# Patient Record
Sex: FEMALE | Race: BLACK OR AFRICAN AMERICAN | NOT HISPANIC OR LATINO | Employment: PART TIME | URBAN - METROPOLITAN AREA
[De-identification: names, ages, dates, MRNs, and addresses within clinical notes are randomized per-mention and may not be internally consistent; named-entity substitution may affect disease eponyms.]

---

## 2018-01-02 ENCOUNTER — ALLSCRIPTS OFFICE VISIT (OUTPATIENT)
Dept: OTHER | Facility: OTHER | Age: 33
End: 2018-01-02

## 2018-01-03 NOTE — PROGRESS NOTES
Assessment   1  Chronic bilateral low back pain without sciatica (724 2,338 29) (M54 5,G89 29)   2  Encounter for gynecological examination without abnormal finding (V72 31) (Z01 419)    Plan   Chronic bilateral low back pain without sciatica    · 1 - Oni Chu MD, Humaira Howe (Anesthesiology) Co-Management  *  Status: Active  Requested    for: 81LCO0033   Ordered; For: Chronic bilateral low back pain without sciatica; Ordered By: Lynne Moreira Performed:  Due: 70IJZ5975  Care Summary provided  : Yes  Dyspareunia in female    · Premarin 0 625 MG/GM Vaginal Cream; INSERT 1 APPLICATION VAGINALLY 2    TIMES A WEEK   Rx By: Lynne Moreira; Dispense: 30 Days ; #:30 GM; Refill: 0;For: Dyspareunia in female; DEVI = N; Record  Encounter for gynecological examination without abnormal finding    · Call [phone] if: You find a new or different kind of lump in your breast ;    Status:Complete;   Done: 02JUX2091 10:54AM   Ordered;For:Encounter for gynecological examination without abnormal finding; Ordered By:Beatriz Swann;   · Eat a normal well-balanced diet ; Status:Complete;   Done: 32NZL5132 10:54AM   Ordered;For:Encounter for gynecological examination without abnormal finding; Ordered By:Beatriz Swann;   · Vitamins can help you get daily requirements that your diet may not be giving you ;    Status:Complete;   Done: 89DSH2963 10:54AM   Ordered;For:Encounter for gynecological examination without abnormal finding; Ordered By:Beatriz Swann;   · We encourage all of our patients to exercise regularly  30 minutes of exercise or physical    activity five or more days a week is recommended for children and adults ;    Status:Complete;   Done: 75YVZ4048 10:54AM   Ordered;For:Encounter for gynecological examination without abnormal finding; Ordered By:Beatriz Swann;   · We recommend that you follow these steps to lower your risk of osteoporosis  ;    Status:Complete;   Done: 11ONX2463 10: 54AM   Ordered;For:Encounter for gynecological examination without abnormal finding; Ordered By:Clarissa Swann;  Oral contraceptive prescribed    · Aviane 0 1-20 MG-MCG Oral Tablet; Take 1 tablet daily   Rx By: Dariel Hernandez; Dispense: 28 Days ; #:28 Tablet; Refill: 11; For: Oral contraceptive prescribed; DEVI = N; Sent To: [facility]Y #[id number]    Discussion/Summary   healthy adult female next cervical cancer screening is due 2021       1  Chronic pain - referal to spine/pain management  jackelinetn relates pain to epidural/child birth/pregnancy dyspareunia - premarin cream, pea sized amount twice per week x 3 months Menorrhagia - change triphasic pill to monophasic  The patient has the current Goals: Healthy lifestyle  The patent has the current Barriers: Chronic back pain  Patient is able to Self-Care  PATIENT EDUCATION RECORD She was given the following educational materials:  age appropraite care guide  Possible side effects of new medications were reviewed with the patient/guardian today  The treatment plan was reviewed with the patient/guardian  The patient/guardian understands and agrees with the treatment plan       Self Referrals: No      Chief Complaint   Patient here for yearly exam       History of Present Illness   HPI: Chronic low back pain since delivery- 15 months daily, constant  Very distressing  vaginal dryness    GYN HM, Adult Female [facility]: The patient is being seen for a gynecology evaluation  The last health maintenance visit was 1 year(s) ago  Social History: Household members include spouse,-- 1 daughter(s),-- 1 son(s)-- and-- son 3, daughter 15 months  She is   General Health: The patient's health since the last visit is described as fair  Lifestyle:  She exercises regularly  -- She does not use tobacco -- She denies alcohol use  -- She denies drug use  Reproductive health:  she reports no menstrual problems        Menstrual history: LMP: the last menstrual period was 12/6/17 -- she uses contraception  For contraception, she uses oral contraception pills  -- she is sexually active  -- pregnancy history: G 4P 2  Screening: Cervical cancer screening includes a pap smear performed 4/18/16, neg-- and-- human papilloma virus screening performed 4/18/16, neg  Review of Systems   vaginal pain-- and-- menorrhagia, but-- no pelvic pain,-- no pelvic pressure,-- no vaginal discharge,-- no vaginal itching,-- no vaginal lump or mass,-- no vaginal odor,-- no nonmenstrual bleeding,-- no dysuria-- and-- no urinary loss of control  Constitutional: No fever, no chills, feels well, no tiredness, no recent weight gain or loss  ENT: no ear ache, no loss of hearing, no nosebleeds or nasal discharge, no sore throat or hoarseness  Cardiovascular: no complaints of slow or fast heart rate, no chest pain, no palpitations, no leg claudication or lower extremity edema  Respiratory: no complaints of shortness of breath, no wheezing, no dyspnea on exertion, no orthopnea or PND  Breasts: no complaints of breast pain, breast lump or nipple discharge  Gastrointestinal: no complaints of abdominal pain, no constipation, no nausea or diarrhea, no vomiting, no bloody stools  Genitourinary: no complaints of dysuria, no incontinence, no pelvic pain, no dysmenorrhea, no vaginal discharge or abnormal vaginal bleeding  Musculoskeletal: no complaints of arthralgia, no myalgia, no joint swelling or stiffness, no limb pain or swelling  Integumentary: no complaints of skin rash or lesion, no itching or dry skin, no skin wounds  Neurological: no complaints of headache, no confusion, no numbness or tingling, no dizziness or fainting  Active Problems   1  BMI 40 0-44 9, adult (V85 41) (Z68 41)   2  Migraines (346 90) (G43 909)   3  Oral contraceptive prescribed (V25 01) (Z30 011)   4   Postpartum exam (V24 2) (Z39 2)    Past Medical History    · History of Childhood asthma (493 00) (J45 909)   · History of Dysuria (788 1) (R30 0)   · History of Gestational diabetes mellitus (GDM) in third trimester, gestational diabetes    method of control unspecified (648 83) (O24 419)   · History of Gestational diabetes mellitus in pregnancy (648 80) (O24 419)   · History of elective  (V13 29) (Z87 42)   · Normal delivery (650) (A22,B88  9)    Surgical History    · History of Surgically Induced  - By Dilation And Evacuation    Family History   Mother    · Family history of cardiac pacemaker (V17 49) (Z84 89)   · Family history of hypertension (V17 49) (Z82 49)   · Family history of malignant neoplasm of breast (V16 3) (Z80 3)   · Family history of Heart disease, ill-defined  Father    · Family history of Insulin dependent diabetes mellitus  Paternal Grandmother    · Family history of Insulin dependent diabetes mellitus    Social History    · Denied: History of Alcohol use   · Always uses seat belt   · Denied: History of Drug use   ·    · Never a smoker    Allergies   1  No Known Environmental Allergies   2  No Known Food Allergies    Vitals    Recorded: 08XIQ4637 49:34ZU   Systolic 577 mm Hg   Diastolic 78 mm Hg   Height 5 ft 4 in   Weight 255 lb    BMI Calculated 43 77 kg/m2   BSA Calculated 2 17 m2   LMP 59HPO3266     Physical Exam        Constitutional      General appearance: No acute distress, well appearing and well nourished  Genitourinary      External genitalia: Normal and no lesions appreciated  Vagina: Normal, no lesions or dryness appreciated  Urethra: Normal        Urethral meatus: Normal        Bladder: Normal, soft, non-tender and no prolapse or masses appreciated  Cervix: Normal, no palpable masses  Uterus: Normal, non-tender, not enlarged, and no palpable masses  Adnexa/parametria: Normal, non-tender and no fullness or masses appreciated         Chest      Breasts: Normal and no dimpling or skin changes noted         Future Appointments      Date/Time Provider Specialty Site   01/07/2019 03:15 PM Estephania Boggs MD Obstetrics/Gynecology [facility]     Signatures    Electronically signed by : Rupinder Kendall MD; Jan 2 2018 10:56AM EST                       (Author)

## 2018-01-10 NOTE — MISCELLANEOUS
Message   Recorded as Task   Date: 2016 02:07 PM, Created By: Carmina Gilbert   Task Name: Review Document   Assigned To: Richard Momin   Regarding Patient: Aaron Castellano, Status: Active   Comment:    Carmina Gilbert - 16 May 2016 2:07 PM     TASK CREATED  Patient has an elevated fasting glucose on her 3 hour GTT  Please make a referral to diabetic pathways  Barb - 16 May 2016 2:51 PM     TASK REPLIED TO: Previously Assigned To [facility],Team  Done        Active Problems    1  Abnormal glucose in pregnancy, antepartum (648 83) (O99 810)   2  Encounter for routine gynecological examination (V72 31) (Z01 419)   3  Encounter for supervision of other normal pregnancy in first trimester (V22 1) (Z34 81)   4  Encounter to determine fetal viability of pregnancy, not applicable or unspecified fetus   (V23 87) (O36 80X0)   5  H/O gestational diabetes in prior pregnancy, currently pregnant (V23 49)   (O09 299,Z86 32)   6  High risk pregnancy due to previous abortions (V23 2) (O09 299)   7  History of elective  (V13 29) (Z87 42)   8  Migraines (346 90) (G43 909)   9  Pregnancy complicated by obesity (079 14,540 59) (O99 210,E66 9)   10  Pregnancy, obstetrical care (V22 1) (Z34 90)    Current Meds   1  CVS DHA Prenatal CAPS; Therapy: (Recorded:2016) to Recorded    Allergies    1  No Known Environmental Allergies   2   No Known Food Allergies    Signatures   Electronically signed by : John Celaya, ; May 16 2016  2:51PM EST                       (Author)

## 2018-01-10 NOTE — MISCELLANEOUS
Message   Recorded as Task   Date: 2016 04:33 PM, Created By: Mariusz Carroll   Task Name: Review Document   Assigned To: Mariusz Carroll   Regarding Patient: Nieves Rosa, Status: In Progress   Comment:    Yumiko Palomino - 2016 4:33 PM     TASK CREATED  Please call patient to inform her that 1h GCT is elevated at 148 - will need 3h GTT  Also, no HIV on this report  Can you verify that that is pending? Valorie Bansal - 2016 2:55 PM     TASK IN PROGRESS   Valorie Bansal - 2016 2:56 PM     TASK REPLIED TO: Previously Assigned To [facility],Team  spoke w/ pt aware  No HIV was done  Will add to 3 hr GTT  Instructions given to pt  Rx faxed  Active Problems    1  Encounter for routine gynecological examination (V72 31) (Z01 419)   2  Encounter for supervision of other normal pregnancy in first trimester (V22 1) (Z34 81)   3  Encounter to determine fetal viability of pregnancy, not applicable or unspecified fetus   (V23 87) (O36 80X0)   4  H/O gestational diabetes in prior pregnancy, currently pregnant (V23 49)   (O09 299,Z86 32)   5  High risk pregnancy due to previous abortions (V23 2) (O09 299)   6  History of elective  (V13 29) (Z87 42)   7  Migraines (346 90) (G43 909)   8  Pregnancy complicated by obesity (134 68,604 98) (O99 210,E66 9)   9  Pregnancy, obstetrical care (V22 1) (Z34 90)    Current Meds   1  CVS DHA Prenatal CAPS; Therapy: (Recorded:2016) to Recorded    Allergies    1  No Known Environmental Allergies   2   No Known Food Allergies    Signatures   Electronically signed by : Hao Navarro, ; 2016  2:57PM EST                       (Author)

## 2018-01-10 NOTE — PROGRESS NOTES
SEP 23 2016         RE: Leona Peterson                        To: [facility]  MR#: [id number]                                    [address]   : [place] [address]                                  [address]   ENC: [id number]:ASHER Thomson, [address]   (Exam #: [id number])                           Fax: [phone]      The LMP of this 27year old,  G4, P1-0-2-1 patient was 2016, giving   her an DAYSI of OCT 29 2016 and a current gestational age of 29 weeks 6 days   by dates  A sonographic examination was performed on SEP 23 2016 using   real time equipment  The ultrasound examination was performed using   abdominal technique  The patient has a BMI of 41 9  Her blood pressure   today was 114/54  Earliest ultrasound found in her record: 16 10w4d 10/31/16 DAYSI      Problem list   1  GDMa2 found in early pregnancy  Initial hgba1c was 5 8%  2  Morbid obesity   3  Probable CHTN with bp of 143/69 and 140/82 outside of pregnancy  4  Proteinuria was found at 30 weeks of pregnancy - 528 mg in 24 hrs  No   other earlier 24 hr protein was completed  Other preeclamptic labs were   normal with a uric acid of 4, nml lfts, and nml plts of 159,000 on 16  Cardiac motion was observed at 139 bpm       INDICATIONS      diabetes, gestational, class A2   increased BMI   proteinuria   chronic hypertension      Exam Types      Amniotic Fluid Index      RESULTS      Fetus # 1 of 1   Vertex presentation   Placenta Location = Anterior   No placenta previa   Placenta Grade = II      The NST was reactive with no decelerations        AMNIOTIC FLUID      Q1: 5 3      Q2: 4 9      Q3: 4 2      Q4: 3 7   NEVAEH Total = 18 0 cm   Amniotic Fluid: Normal      IMPRESSION      Rebolledo IUP   Vertex presentation   Regular fetal heart rate of 139 bpm   Anterior placenta   No placenta previa      GENERAL COMMENT      Her follow up care should include twice weekly NST's and a once weekly   amniotic fluid assessment, along with her daily kick counts  Nabeel Dennis asked to speak with me today  She wanted to review the last note by   diabetes education in which they were adding another type of insulin   called Humalog 4 units with each meal  They also are increasing her Lantus   120 units at nighttime divided into 2 equal injections in 2 different   areas the body to cover her high FBS  She was concerned that she needs so   much insulin this time  I looked at her prior pregnancy and she was using   more insulin then ( 220 u of Lantus over all giving 80 with dinner and 140   units q hs  She was also on Humalog 9 with breakfast and 8 u with lunch   and 6 u with dinner)  She weighed less at 234 lbs near her DAYSI in her last   pregnancy  Now she weighs 244  Her father and paternal grand father have   IDDM  Her last hemoglobin A1c was 5 4 on September 9  She asked if there were any of the options to control her sugars since it   seems to her like she keep needing higher doses  I looked up Metformin and   did not find a contraindication  Will start metfomin in addition to her   insulin based on her request  Will notify Diabetes education  The face to face time, in addition to time spent discussing ultrasound   results, was 15 minutes, greater than 50% of which was spent during   counseling and coordination of care  ELEAZAR Purdy M D     Maternal-Fetal Medicine   Electronically signed 09/24/16 13:24

## 2018-01-10 NOTE — MISCELLANEOUS
Provider Comments  Provider Comments:   pt n/s for her annual appt      Signatures   Electronically signed by : Severino Jacobson, ; Apr 5 2017  9:40AM EST                       (Author)

## 2018-01-10 NOTE — MISCELLANEOUS
Message   Recorded as Task   Date: 10/19/2016 02:20 PM, Created By: Eron Peters   Task Name: Follow Up   Assigned To: Hossein Clark   Regarding Patient: Wallace Graham, Status: In Progress   Sarah Arias - 19 Oct 2016 2:20 PM     TASK CREATED  Caller: Self; [phone] (Home)  pt has some questions for nurse call her at [phone]   [facility] - 19 Oct 2016 3:32 PM     TASK IN [address] [place] [address]  - 19 Oct 2016 3:54 PM     TASK REPLIED TO: Previously Assigned To [address] to pt  See task that was sent to   Active Problems    1  BMI 40 0-44 9, adult (V85 41) (Z68 41)   2  Breast feeding status of mother (V24 1) (Z39 1)   3  Chronic hypertension in pregnancy (642 00) (O10 919)   4  High risk pregnancy due to previous abortions (V23 2) (O09 299)   5  Insulin controlled gestational diabetes mellitus (GDM) in third trimester (648 83)   (O24 414)   6  Maternal morbid obesity, antepartum, third trimester (649 13,278 01) (O99 213,E66 01)   7  Migraines (346 90) (G43 909)   8  Pregnancy, obstetrical care (V22 1) (Z34 90)   9  Proteinuria affecting pregnancy in third trimester (646 23,791 0) (O12 13)   10  Supervision of high risk pregnancy in third trimester (V23 9) (O09 93)    Current Meds   1  Breast Pump Miscellaneous; Therapy: 13MEV2320 to Recorded   2  CVS DHA Prenatal CAPS; Therapy: (Recorded:08Apr2016) to Recorded   3  HumaLOG KwikPen 100 UNIT/ML Subcutaneous Solution Pen-injector; Humalog kwik   pen 4 units TID with meals  Titrate as directed; Therapy: 39LMW5948 to (Evaluate:20Jan2017)  Requested for: 98XKN6910; Last   Rx:17Zpi4267 Ordered   4  MetFORMIN HCl - 500 MG Oral Tablet; Take one tab daily with breakfast and two tablets   with dinner; Therapy: 80EVD6875 to (Bertis Ore)  Requested for: 75XAU8196; Last   Rx:29Sep2016 Ordered   5  NovoFine 30G X 8 MM Miscellaneous; QID with insulin injections;    Therapy: 13NCK3170 to (Last Rx:29Sep2016) Requested for: 44LLX5607 Ordered   6  OneTouch Delica Lancets 23T Miscellaneous; TEST 4 TIMES DAILY; Therapy: 15GWA5665 to (Evaluate:51Wnw6693)  Requested for: 09NOQ3791; Last   Rx:49Fpn0642 Ordered   7  OneTouch Verio In [employer]; test 4 times daily; Therapy: 22LQQ5861 to (Evaluate:05Ego2945)  Requested for: 83TNU3819; Last   Rx:63Hmg0657 Ordered   8  Toujeo SoloStar 300 UNIT/ML Subcutaneous Solution Pen-injector; inject 144 units at   HS; titrate twice weekly as directed; Therapy: 81CLJ2377 to Ramu Matson)  Requested for: 05DJZ9211; Last   Rx:25Quf9418 Ordered    Allergies    1  No Known Environmental Allergies   2   No Known Food Allergies    Signatures   Electronically signed by : Asha Cosby, ; Oct 19 2016  3:54PM EST                       (Author)

## 2018-01-11 NOTE — PROGRESS NOTES
Chief Complaint  Patient received Tdap in Left Deltoid without complaints  OBB#[id number] LOT# [id number] EXP: 9/28/2018  Patient received Flu shot in Right Deltoid without complaints  EDQ#[id number] [facility]#[id number] EXP: 6/30/2017      Active Problems    1  BMI 40 0-44 9, adult (V85 41) (Z68 41)   2  Gestational HTN (642 30) (O13 9)   3  High risk pregnancy due to previous abortions (V23 2) (O09 299)   4  Insulin controlled gestational diabetes mellitus (GDM) in third trimester (648 83)   (O24 414)   5  Maternal morbid obesity, antepartum, third trimester (649 13,278 01) (O99 213,E66 01)   6  Migraines (346 90) (G43 909)   7  Pregnancy complicated by obesity (125 22,867 96) (O99 210,E66 9)   8  Pregnancy, obstetrical care (V22 1) (Z34 90)   9  Proteinuria affecting pregnancy in third trimester (646 23,791 0) (O12 13)   10  Supervision of high risk pregnancy in third trimester (V23 9) (O09 93)    Current Meds   1  CVS DHA Prenatal CAPS; Therapy: (Recorded:08Apr2016) to Recorded   2  Lantus SoloStar 100 UNIT/ML Subcutaneous Solution Pen-injector; 52 units at hs, titrate   weekly as directed; Therapy: 79TDD2575 to (Last Rx:75Yfu2470)  Requested for: 57Hkf3125 Ordered   3  NovoFine 30G X 8 MM Miscellaneous; daily with insulin injection; Therapy: 24DAY0386 to (Gil Peoples)  Requested for: 94XIB0196; Last   Rx:08Jun2016 Ordered   4  OneTouch Delica Lancets 44B Miscellaneous; TEST 4 TIMES DAILY; Therapy: 83ENK1047 to (Evaluate:77Tst8149)  Requested for: 30PRH3608; Last   Rx:67Wba6137 Ordered   5  OneTouch Verio In [employer]; test 4 times daily; Therapy: 97HQG7013 to (Evaluate:98Jnh3306)  Requested for: 41RYC9313; Last   Rx:08Enk7542 Ordered    Allergies    1  No Known Environmental Allergies   2   No Known Food Allergies    Vitals  Signs    Systolic: 683  Diastolic: 80  LMP: 93-SALTER-7615  Height: 5 ft 4 in  Weight: 246 lb 10 08 oz  BMI Calculated: 42 33  BSA Calculated: 2 15    Future Appointments    Date/Time Provider Specialty Site   10/14/2016 03:00 PM ALEXANDER Carrillo   Obstetrics/Gynecology [facility]   10/21/2016 12:00 PM Lc Cardona DO Obstetrics/Gynecology [facility]   2016 11:00 AM  [place], Schedule  [facility]   2016 11:30 AM  [place] [facility]   2016 11:00 AM  [place] [facility], Schedule  [facility]   2016 11:00 AM  [place] [facility]   2016 11:30 AM  [place] [facility]   10/04/2016 01:00 PM Abelardo Mcdonald [facility] Obstetrics/Gynecology [facility]   2016 09:15 AM [facility] 1st Year Resident, Schedule   Marco Antonio Duran 41     Signatures   Electronically signed by : ALEXANDER Galvin ; Sep 20 2016  3:16PM EST

## 2018-01-11 NOTE — PROGRESS NOTES
Active Problems    1  BMI 40 0-44 9, adult (V85 41) (Z68 41)   2  Insulin controlled gestational diabetes mellitus (GDM) in third trimester (648 83)   (O24 414)   3  Maternal morbid obesity, antepartum, third trimester (649 13,278 01) (O99 213,E66 01)   4  Proteinuria affecting pregnancy in third trimester (646 23,791 0) (O12 13)    Allergies    1  No Known Environmental Allergies   2  No Known Food Allergies    Vitals  Signs   Recorded: 16JXN1017 90:38CY   Systolic: 933, LUE, Sitting  Diastolic: 54, LUE, Sitting  Pain Scale: 0  Height: 5 ft 4 in  Weight: 245 lb 9 6 oz  BMI Calculated: 42 16  BSA Calculated: 2 13  BP Cuff Size: Large    Procedure    G/P 4/1   [facility] 10/29/2016   EGA 33 6/7   /59   Indication: gestational diabetes and morbid obesity  Duration of Test 24 minutes  Result: Reactive and > 15 bpm with movement  Baseline Rate 145 bpm    Deceleration: None  Uterine Activity: None  NEVAEH: 19 2 cms  Comment: vtx   Placental stGstrstastdstest:st st1st Required # Stimuli Response: No    Fetal kick counts were reviewed with the patient  Recommend NST: twice weekly  Recommend NEVAEH: weekly  Current Meds   1  CVS DHA Prenatal CAPS; Therapy: (Recorded:72Vnm2781) to Recorded   2  Lantus SoloStar 100 UNIT/ML Subcutaneous Solution Pen-injector; 52 units at hs, titrate   weekly as directed; Therapy: 37RTU1502 to (Last Rx:33Eje9429)  Requested for: 19Omf9059 Ordered   3  NovoFine 30G X 8 MM Miscellaneous; daily with insulin injection; Therapy: 33GGC3092 to (Carlos Joying)  Requested for: 16NOC9114; Last   Rx:08Jun2016 Ordered   4  OneTouch Delica Lancets 33O Miscellaneous; TEST 4 TIMES DAILY; Therapy: 48UOZ7827 to (Evaluate:78Ege3736)  Requested for: 87NTP0329; Last   Rx:84Giv6633 Ordered   5  OneTouch Verio In [employer]; test 4 times daily;    Therapy: 22FJC7307 to (Evaluate:01Ylh9297)  Requested for: 70CBB1900; Last   JX:95UOQ1077 Ordered    Future Appointments    Date/Time Provider Specialty Site   10/14/2016 03:00 PM ALEXANDER Prince  Obstetrics/Gynecology [facility]   10/21/2016 12:00 PM Anthony Jennings DO Obstetrics/Gynecology [facility]   2016 11:30 AM  Makayla Lugo, [address] [place] [address] Road   2016 11:00 AM  Sylvie Zhen, Schedule  [facility]   2016 11:30 AM  [facility]   2016 11:00 AM  Sylvie Zhen, Schedule  [facility]   2016 11:00 AM  Sylvie Zhen, Schedule  [facility]   2016 11:30 AM  Transfer, Schedule  [facility]   10/04/2016 01:00 PM Forrest Claros [facility] Obstetrics/Gynecology [facility]   2016 01:00 PM ALEXANDER Corbin   Obstetrics/Gynecology [facility]   2016 09:15 AM ELEAZAR Walls 1st Year Resident, Schedule  \[facility] [place] [facility]\""  [address]     Signatures   Electronically signed by : Vanessa Lundy, ; Sep 16 2016 11:38AM EST                       (Author)    Electronically signed by : MIKA Floyd ,MD; Sep 16 2016  1:22PM EST                       (Author)

## 2018-01-13 NOTE — RESULT NOTES
Message   Please ensure that a hard copy is signed off please     Verified Results  (1) SEQUENTIAL SCREEN 2 [id number] 11:49AM Gigi Ramirez     Test Name Result Flag Reference   SCAN RESULT      Results available in the [facility]

## 2018-01-13 NOTE — PROGRESS NOTES
SEP 30 2016         RE: Kalyan JiménezMekaCapone                        To: [facility]  MR#: [id number]                                    [address]   : [address]                                  [address]   ENC: [id number]:ANIBALYORDY Thomson, [address]   (Exam #: [id number])                           Fax: [phone]      The LMP of this 27year old,  G4, P1-0-2-1 patient was 2016, giving   her an DAYSI of OCT 29 2016 and a current gestational age of 27 weeks 6 days   by dates  A sonographic examination was performed on SEP 30 2016 using   real time equipment  The ultrasound examination was performed using   abdominal technique  The patient has a BMI of 42 0  Her blood pressure   today was 110/56  Earliest ultrasound found in her record: 16 10w4d 10/31/16 DAYSI      Problem list   1  GDMa2 found in early pregnancy  Initial hgba1c was 5 8%  2  Morbid obesity   3  Probable CHTN with bp of 143/69 and 140/82 outside of pregnancy - no   meds  4  Proteinuria was found at 30 weeks of pregnancy - 528 mg in 24 hrs  No   other earlier 24 hr protein was completed  Other preeclamptic labs were   normal with a uric acid of 4, nml lfts, and nml plts of 159,000 on 16  Kalyan has no complaints her to reports regular fetal movement and denies   problems related to vaginal bleeding,  labor, or preeclampsia  She   is currently treated with 144 units of Lantus at night, along with 4 units   of Humalog with each meal, and 500 mg of metformin before breakfast, and   1000 mg before dinner  Her most recent hemoglobin A1c on  was   5 4%  She will be converted soon to [facility] therapy given the high dose of   Lantus currently required        Cardiac motion was observed at 144 bpm       INDICATIONS      diabetes, gestational, class A2   morbid obesity      Exam Types      Level I   NST      RESULTS      Fetus # 1 of 1   Vertex presentation   Fetal growth appeared normal   Placenta Location = Anterior, fundal   No placenta previa   Placenta Grade = II      The NST was reactive with no decelerations  MEASUREMENTS (* Included In Average GA)      AC              34 5 cm        38 weeks 5 days* (>95%)   BPD              8 4 cm        33 weeks 6 days* (14%)   HC              31 2 cm        34 weeks 4 days* (15%)   Femur            7 2 cm        36 weeks 3 days* (67%)      HC/AC           0 90   FL/AC           0 21   FL/BPD          0 86   EFW (Ac/Fl/Hc)  3153 grams - 6 lbs 15 oz                 (70%)      THE AVERAGE GESTATIONAL AGE is 35 weeks 6 days +/- 21 days  AMNIOTIC FLUID      Q1: 3 7      Q2: 2 8      Q3: 5 8      Q4: 3 6   NEVAEH Total = 15 8 cm   Amniotic Fluid: Normal      ANATOMY DETAILS      Visualized Appearing Sonographically Normal:   HEAD: (Calvarium, BPD Level, Cavum, Lateral Ventricles, Cerebellum,   Cisterna Magna);    TH  CAV : (Diaphragm); HEART: (Four Chamber View,   Proximal Right Outflow, 3 Vessel Trachea, Interventricular Septum,   Interatrial Septum, Cardiac Axis, Cardiac Position);    STOMACH, RIGHT   KIDNEY, LEFT KIDNEY, BLADDER, PLACENTA      IMPRESSION      Rebolledo IUP   35 weeks and 6 days by this ultrasound  (DAYSI=OCT 29 2016)   Vertex presentation   Fetal growth appeared normal   Regular fetal heart rate of 144 bpm   Anterior, fundal placenta   No placenta previa      GENERAL COMMENT      No fetal structural abnormality is identified on the Level I survey today  Evaluation of biometry reveals an abdominal circumference size measured at   greater than the 95th percentile for this gestational age, with an overall   estimated fetal weight placed at the 70th percentile  The amniotic fluid   volume is normal       Today's ultrasound findings and suggested follow-up were discussed in   detail with Cail Higgins  Fetal growth will be reassessed at about 38 weeks   gestation    Suggested follow-up fetal surveillance also includes   continuation of twice per week NST's, weekly NEVAEH's, and daily kick counts  Yolis Hanson should continue to maintain contact with the Diabetes and Pregnancy   program in the [facility]  at least once a week for review of her   blood glucose values and adjustment of insulin doses  Delivery is   recommended at 44 to 40 weeks gestation, earlier if otherwise clinically   indicated  The face to face time, in addition to time spent discussing ultrasound   results, was 10 approximately  minutes, greater than 50% of which was   spent during counseling and coordination of care  ELEAZAR Sauer M D     Maternal-Fetal Medicine   Electronically signed 09/30/16 13:22

## 2018-01-13 NOTE — PROGRESS NOTES
Active Problems    1  BMI 40 0-44 9, adult (V85 41) (Z68 41)   2  Chronic hypertension in pregnancy (642 00) (O10 919)   3  High risk pregnancy due to previous abortions (V23 2) (O09 299)   4  Insulin controlled gestational diabetes mellitus (GDM) in third trimester (648 83)   (O24 414)   5  Maternal morbid obesity, antepartum, third trimester (649 13,278 01) (O99 213,E66 01)   6  Migraines (346 90) (G43 909)   7  Pregnancy, obstetrical care (V22 1) (Z34 90)   8  Proteinuria affecting pregnancy in third trimester (646 23,791 0) (O12 13)   9  Supervision of high risk pregnancy in third trimester (V23 9) (O09 93)    Allergies    1  No Known Environmental Allergies   2  No Known Food Allergies    Vitals  Signs   Recorded: 04HFV2159 98:38KD   Systolic: 159, LUE, Sitting  Diastolic: 60, LUE, Sitting  Pain Scale: 0  Height: 5 ft 4 in  Weight: 244 lb 4 8 oz  BMI Calculated: 41 93  BSA Calculated: 2 13  BP Cuff Size: Large    Procedure    G/P 4/1   [facility] 10/29/2016   EGA 37 6/7   /60   Indication: gestational diabetes and morbid obesity  Duration of Test 20 minutes  Result: Reactive and > 15 bpm with movement  Baseline Rate 150 bpm    Deceleration: None  Uterine Activity: None  Required # Stimuli Response: No    Fetal kick counts were reviewed with the patient  Recommend NST: twice weekly  Recommend NEVAEH: weekly  Current Meds   1  CVS DHA Prenatal CAPS; Therapy: (Recorded:08Apr2016) to Recorded   2  HumaLOG KwikPen 100 UNIT/ML Subcutaneous Solution Pen-injector; Humalog kwik   pen 4 units TID with meals  Titrate as directed; Therapy: 46WHV1827 to (Evaluate:20Jan2017)  Requested for: 84EUO5321; Last   Rx:59Qxl8298 Ordered   3  MetFORMIN HCl - 500 MG Oral Tablet; Take one tab daily with breakfast and two tablets   with dinner; Therapy: 07SPX5839 to (Krista Pap)  Requested for: 16NVL5948; Last   Rx:48Oek5293 Ordered   4   NovoFine 30G X 8 MM Miscellaneous; QID with insulin injections; Therapy: 73LNL9054 to (Last Rx:39Guf6907)  Requested for: 14OYX8133 Ordered   5  OneTouch Delica Lancets 37J Miscellaneous; TEST 4 TIMES DAILY; Therapy: 73NHC8572 to (Evaluate:44Dup7146)  Requested for: 89MXC1371; Last   Rx:55Eko4418 Ordered   6  OneTouch Verio In [employer]; test 4 times daily; Therapy: 53WGJ6101 to (Evaluate:92Xly7105)  Requested for: 50USD8318; Last   Rx:60Hre4758 Ordered   7  Toujeo SoloStar 300 UNIT/ML Subcutaneous Solution Pen-injector; inject 144 units at   HS; titrate twice weekly as directed;    Therapy: 51FSU9794 to [phone])  Requested for: 53FYY9944; Last   Rx:80Amr1585 Ordered    Future Appointments    Date/Time Provider Specialty Site   10/21/2016 12:00 PM Hilda Velázquez DO Obstetrics/Gynecology [facility]   10/18/2016 11:00 AM  [place], Schedule  [address]   10/18/2016 11:30 AM  [place], Schedule  [facility]   10/21/2016 11:00 AM  [place], Schedule  [facility]   10/25/2016 11:40 [facility]   2016 09:15 AM [facility] 1st Year Resident, Schedule  [facility]  [address]     Signatures   Electronically signed by : Bria An, ; Oct 14 2016 11:20AM EST                       (Author)    Electronically signed by : MIKA Rodrigues ,MD; Oct 14 2016  1:34PM EST                       (Author)

## 2018-01-13 NOTE — MISCELLANEOUS
Message   Recorded as Task   Date: 10/19/2016 03:53 PM, Created By: Elizabeth Vance   Task Name: Care Coordination   Assigned To: Maria Elena Calles OB,Team   Regarding Patient: Gloria Gtz, Status: In Progress   Alin Glass - 19 Oct 2016 3:53 PM     TASK CREATED  Spoke with pt today, she was seen by you for last PNAT apt yesterday  She is very concerned that she has to further increase her insulin due to some uncontrolled fasting blood sugars  Per pt, Troy Damon from [employer] told her through email that the baby is storing "fat in her belly", and therefore pt needs to make sure her sugars are under control  Spoke with pt at some length, she is aware her u/s on 9/30 showed babies abdominal circumference in the 95th percentile, however, delivery was still recommended at 39 to 40wks  Pt has IOL scheduled for 9/26 - she is very anxious about waiting  Feels the baby "should just come out now  She has an apt with [facility] Friday, she would like speak with a provider? Would you be able to call this pt to further discuss? Please advise  Thank you! Mohini Gaitan - 19 Oct 2016 4:55 PM     TASK REPLIED TO: Previously Assigned To Mohini Gaitan  I am at a loss, what are her sugars now with the increase  I will speak with Dr Keara Vance - 19 Oct 2016 6:09 PM     TASK REASSIGNED: Previously Assigned To [employer] with LS (who did consult with [address] [place] [address])  Based on pts most recent Blood Sugar levels they will discuss with [facility] the possibility of earlier induction  LS to send a tesk to [facility] today, pt can expect a call back tomorrow with karyn cronin  Left a message to notify pt we cannot advice induction with [facility] recommendation  Elizabeth Vance - 98 Oct 2016 6:10 PM     TASK IN PROGRESS   Valorie Bansal - 20 Oct 2016 9:33 AM     TASK REPLIED TO: Previously Assigned To DOMENIC OB,Team  per [facility] pt to induced 10/21/16   pt is sched  for 0800 pit/AROM  unit and pt aware  Active Problems    1  BMI 40 0-44 9, adult (V85 41) (Z68 41)   2  Breast feeding status of mother (V24 1) (Z39 1)   3  Chronic hypertension in pregnancy (642 00) (O10 919)   4  High risk pregnancy due to previous abortions (V23 2) (O09 299)   5  Insulin controlled gestational diabetes mellitus (GDM) in third trimester (648 83)   (O24 414)   6  Maternal morbid obesity, antepartum, third trimester (649 13,278 01) (O99 213,E66 01)   7  Migraines (346 90) (G43 909)   8  Pregnancy, obstetrical care (V22 1) (Z34 90)   9  Proteinuria affecting pregnancy in third trimester (646 23,791 0) (O12 13)   10  Supervision of high risk pregnancy in third trimester (V23 9) (O09 93)    Current Meds   1  Breast Pump Miscellaneous; Therapy: 74CZB9985 to Recorded   2  CVS DHA Prenatal CAPS; Therapy: (Recorded:08Apr2016) to Recorded   3  HumaLOG KwikPen 100 UNIT/ML Subcutaneous Solution Pen-injector; Humalog kwik   pen 4 units TID with meals  Titrate as directed; Therapy: 31UUF8676 to (Evaluate:20Jan2017)  Requested for: 56DYB0422; Last   Rx:75Vlg6879 Ordered   4  MetFORMIN HCl - 500 MG Oral Tablet; Take one tab daily with breakfast and two tablets   with dinner; Therapy: 55IIW3172 to (Jordan Munoz)  Requested for: 48UDT5391; Last   Rx:61Jns9868 Ordered   5  NovoFine 30G X 8 MM Miscellaneous; QID with insulin injections; Therapy: 56DYJ5390 to (Last Rx:91Egk5064)  Requested for: 84NUI4248 Ordered   6  OneTouch Delica Lancets 93U Miscellaneous; TEST 4 TIMES DAILY; Therapy: 49QMX5287 to (Evaluate:00Ent9359)  Requested for: 62IID9062; Last   Rx:90Qmx1200 Ordered   7  OneTouch Verio In [employer]; test 4 times daily; Therapy: 98DOE0530 to (Evaluate:55Unk7006)  Requested for: 50ODO3866; Last   Rx:67Bkh2188 Ordered   8  Toujeo SoloStar 300 UNIT/ML Subcutaneous Solution Pen-injector; inject 144 units at   HS; titrate twice weekly as directed;    Therapy: 09GVU5884 to [phone])  Requested for: 86SEN9255; Last Rx: 72GAD3911 Ordered    Allergies    1  No Known Environmental Allergies   2   No Known Food Allergies    Signatures   Electronically signed by : Sharee Goodell, ; Oct 20 2016  9:33AM EST                       (Author)

## 2018-01-13 NOTE — MISCELLANEOUS
Message  Return to work or school:   Twin Mccollum is under my professional care  She was seen in my office on 6/28/16     She is able to perform activities of daily living without limitations  Weight Bearing Status: No Weight-Bearing  [place] Margie wishes to start her maternity leave on 7/18/2016 due to the excessive consecutive work days , long work hours, and extreme heat          Signatures   Electronically signed by : Sonia Holbrook, ; Jul 15 2016  3:56PM EST                       (Author)

## 2018-01-15 NOTE — PROGRESS NOTES
SEP 2 2016         RE: Pankaj Buck Nicholas                        To: [facility]  MR#: [id number]                                    [address]   : [address] [facility]                                  [address]   ENC: [id number]:MALCOLM Thomson, [address]   (Exam #: [id number])                           Fax: [phone]      The LMP of this 27year old,  G4, P1-0-2-1 patient was 2016, giving   her an DAYSI of OCT 29 2016 and a current gestational age of 34 weeks 6 days   by dates  A sonographic examination was performed on SEP 2 2016 using real   time equipment  The ultrasound examination was performed using abdominal   technique  The patient has a BMI of 42 0  Her blood pressure today was   101/70  Earliest ultrasound found in her record: 16 10w4d 10/31/16 DAYSI            Pankaj Buck has no complaints  She reports regular fetal movement and denies   problems related to vaginal bleeding or  labor  She recently had a   laboratory workup performed secondary to proteinuria on dipstick   evaluation in the office  A 24-hour urine specimen revealed just over 300   mg of protein  A CBC and platelet count, along with a comprehensive   metabolic profile, was normal  Pankaj Buck is currently treated with 68 units   of Lantus at night      Cardiac motion was observed at 140 bpm       INDICATIONS      morbid obesity   diabetes, gestational, class A2   Interval growth assesment      Exam Types      Level I      RESULTS      Fetus # 1 of 1   Vertex presentation   Fetal growth appeared normal   Placenta Location = Anterior   No placenta previa   Placenta Grade = II      The NST was reactive with no decelerations        MEASUREMENTS (* Included In Average GA)      AC              27 9 cm        32 weeks 0 days* (48%)   BPD              7 8 cm        31 weeks 2 days* (30%)   HC              28 8 cm        31 weeks 2 days* (23%)   Femur            6 2 cm        32 weeks 0 days* (52%)      Cerebellum       4 1 cm        33 weeks 6 days      HC/AC           1 03   FL/AC           0 22   FL/BPD          0 80   EFW (Ac/Fl/Hc)  1870 grams - 4 lbs 2 oz                 (45%)      THE AVERAGE GESTATIONAL AGE is 31 weeks 5 days +/- 18 days  AMNIOTIC FLUID      Q1: 5 6      Q2: 4 9      Q3: 3 7      Q4: 6 0   NEVAEH Total = 20 2 cm   Amniotic Fluid: Normal      ANATOMY DETAILS      Visualized Appearing Sonographically Normal:   HEAD: (Calvarium, BPD Level, Cavum, Lateral Ventricles, Choroid Plexus,   Cerebellum, Cisterna Magna);    TH  CAV : (Diaphragm); HEART: ([employer], Cardiac Axis);    STOMACH, RIGHT KIDNEY, LEFT KIDNEY,   BLADDER, SPINE: (Cervical Spine, Thoracic Spine, Lumbar Spine, Sacrum); PLACENTA      IMPRESSION      Rebolledo IUP   31 weeks and 5 days by this ultrasound  (DAYSI=OCT 30 2016)   Vertex presentation   Fetal growth appeared normal   Regular fetal heart rate of 140 bpm   Anterior placenta   No placenta previa      GENERAL COMMENT      No fetal structural abnormality is identified on the Level I survey today  Fetal interval growth and amniotic fluid volume are normal       Today's ultrasound findings and suggested follow-up were discussed in   detail with Kiana Agrawal  Fetal growth will be reassessed in 4 weeks  Suggested   follow-up fetal surveillance also includes continuation of twice per week   NST's, weekly NEVAEH's, and daily kick counts  Kiana Agrawal should continue to   maintain contact with the Diabetes and Pregnancy program in the [address] [place] [address] at least once a week for review of her blood glucose values and   adjustment of insulin doses  Delivery is recommended at 44 to 40 weeks   gestation, earlier if otherwise clinically indicated, including with a   diagnosis of preeclampsia        The face to face time, in addition to time spent discussing ultrasound   results, was 10 minutes, greater than 50% of which was spent during   counseling and coordination of care  Venda Pallas, R D M S Delaney Rolls, M D     Maternal-Fetal Medicine   Electronically signed 09/02/16 12:42

## 2018-01-15 NOTE — MISCELLANEOUS
Message   Recorded as Task   Date: 2016 03:02 PM, Created By: Williams Rob   Task Name: Review Document   Assigned To: Nay Swann   Regarding Patient: Flaquito Barnett, Status: In Progress   CommentLevora Libman - 02 Sep 2016 3:02 PM     TASK CREATED  please call Ruddy Thomas- labs are ok but protein is slightly high  recent BP is normal- we will continue to monitor BP has APFS with [facility]  if another episode of elevated BP may need to repeat labs or further management   Valorie Bansal - 02 Sep 2016 4:10 PM     TASK REPLIED TO: Previously Assigned To DOMENIC OB,Team        lmom to cb   lindaValorie - 06 Sep 2016 8:11 AM     TASK REPLIED TO: Previously Assigned To DOMENIC OB,Team       lmom to [facility] - 06 Sep 2016 8:11 AM     TASK IN PROGRESS   RolfValorie - 06 Sep 2016 3:52 PM     TASK REPLIED TO: Previously Assigned To DOMENIC OB,Team  spoke w/ pt aware  pt has f/u w/ [facility]  Active Problems    1  Abnormal glucose in pregnancy, antepartum (648 83) (O99 810)   2  BMI 40 0-44 9, adult (V85 41) (Z68 41)   3  Encounter for routine gynecological examination (V72 31) (Z01 419)   4  Encounter for supervision of other normal pregnancy in first trimester (V22 1) (Z34 81)   5  Encounter to determine fetal viability of pregnancy, not applicable or unspecified fetus   (V23 87) (O36 80X0)   6  Gestational hypertension, antepartum (642 33) (O13 9)   7  H/O gestational diabetes in prior pregnancy, currently pregnant (V23 49)   (O09 299,Z86 32)   8  High risk pregnancy due to previous abortions (V23 2) (O09 299)   9  History of elective  (V13 29) (Z87 42)   10  Insulin controlled gestational diabetes mellitus (GDM) in second trimester (648 83)    (O24 414)   11  Maternal morbid obesity, antepartum, second trimester (649 13,278 01)    (O99 212,E66 01)   12  Maternal morbid obesity, antepartum, third trimester (649 13,278 01) (O99 213,E66 01)   13  Migraines (346 90) (G43 909)   14  Pregnancy complicated by obesity (306 40,401 49) (O99 210,E66 9)   15  Pregnancy, obstetrical care (V22 1) (Z34 90)    Current Meds   1  CVS DHA Prenatal CAPS; Therapy: (Recorded:08Apr2016) to Recorded   2  Lantus SoloStar 100 UNIT/ML Subcutaneous Solution Pen-injector; 52 units at hs, titrate   weekly as directed; Therapy: 23KJZ3890 to (Last Rx:11Orp9156)  Requested for: 99Ilx1382 Ordered   3  NovoFine 30G X 8 MM Miscellaneous; daily with insulin injection; Therapy: 35OSF5912 to (Charol Pool)  Requested for: 82UNX9753; Last   Rx:08Jun2016 Ordered   4  OneTouch Delica Lancets 57M Miscellaneous; TEST 4 TIMES DAILY; Therapy: 00DQA1728 to (Evaluate:77Uim6660)  Requested for: 68KOM0910; Last   Rx:32Btz1860 Ordered   5  OneTouch Verio In [employer]; test 4 times daily; Therapy: 40DUF9185 to (Evaluate:50Hwi1444)  Requested for: 98RIG0608; Last   Rx:23Vwr7413 Ordered    Allergies    1  No Known Environmental Allergies   2   No Known Food Allergies    Signatures   Electronically signed by : Cheryle Boyers, ; Sep  6 2016  3:53PM EST                       (Author)

## 2018-01-15 NOTE — PROGRESS NOTES
2016         RE: Pankaj Peterson                        To: [facility]  MR#: [id number]                                    [address]   : [address] [facility]                                  [address]   ENC: [id number]                             Berto, Valeria [address] Mayers   (Exam #: [id number])                           Fax: [phone]      The LMP of this 27year old,  G4, P1-0-2-1 patient was 2016, giving   her an DAYSI of OCT 29 2016 and a current gestational age of 24 weeks 4 days   by dates  A sonographic examination was performed on 2016 using real   time equipment  The ultrasound examination was performed using abdominal &   vaginal techniques  The patient has a BMI of 41 9  Her blood pressure   today was 112/56        Earliest ultrasound found in her record: 16 10w4d 10/31/16 DAYSI      Cardiac motion was observed at 150 bpm       INDICATIONS      diabetes, gestation-previous pregnancy   morbid obesity   fetal anatomical survey   diabetes, gestational, class A2      Exam Types      Transvaginal   LEVEL II      RESULTS      Fetus # 1 of 1   Transverse presentation   Fetal growth appeared normal   Placenta Location = Anterior, fundal   No placenta previa   Placenta Grade = I      MEASUREMENTS (* Included In Average GA)      AC              14 1 cm        19 weeks 1 day * (43%)   BPD              4 3 cm        18 weeks 6 days* (33%)   HC              16 1 cm        18 weeks 6 days* (26%)   Femur            3 0 cm        19 weeks 3 days* (37%)      Nuchal Fold      4 3 mm      Humerus          3 0 cm        19 weeks 6 days  (55%)   Radius           2 6 cm        20 weeks 4 days   Ulna             2 7 cm        19 weeks 6 days   Tibia            2 7 cm        19 weeks 5 days  (46%)   Fibula           2 7 cm        19 weeks 0 days   Foot             3 1 cm        19 weeks 2 days      Cerebellum       2 1 cm        20 weeks 4 days   Biorbit          2 9 cm 19 weeks 0 days   CisternaMagna    4 1 mm      HC/AC           1 15   FL/AC           0 21   FL/BPD          0 71   EFW (Ac/Fl/Hc)   284 grams - 0 lbs 10 oz      THE AVERAGE GESTATIONAL AGE is 19 weeks 1 day +/- 10 days  AMNIOTIC FLUID         Largest Vertical Pocket = 6 9 cm   Amniotic Fluid: Normal      CERVICAL EVALUATION      SUPINE      Cervical Length: 4 30 cm      OTHER TEST RESULTS           Funneling?: No             Dynamic Changes?: No        Resp  To TFP?: No      ANATOMY DETAILS      Visualized Appearing Sonographically Normal:   HEAD: (Calvarium, BPD Level, Cavum, Lateral Ventricles, Choroid Plexus,   Cerebellum, Cisterna Magna);    FACE/NECK: (Neck, Nuchal Fold, Profile,   Orbits, Nose/Lips, Palate, Face);    TH  CAV : (Diaphragm); HEART:   (Four Chamber View, Proximal Left Outflow, Proximal Right Outflow, 3   Vessel Trachea, Short Axis of Greater Vessels, Ductal Arch, Aortic Arch,   Interventricular Septum, Interatrial Septum, Cardiac Axis, Cardiac   Position);    ABD  CAV , STOMACH, RIGHT KIDNEY, LEFT KIDNEY, BLADDER, ABD  WALL, SPINE: (Cervical Spine, Thoracic Spine, Lumbar Spine, Sacrum);      EXTREMS: (Lt Humerus, Rt Humerus, Lt Forearm, Rt Forearm, Lt Hand, Rt   Hand, Lt Femur, Rt Femur, Lt Low Leg, Rt Low Leg, Lt Foot, Rt Foot);      GENITALIA, PLACENTA, UMBL  CORD, UTERUS, PCI      FIBROIDS      1  Submucosal myometrium fibroid located in the anterior corpus region,   measuring 1 8 x 1 1 x 1 7cm with a volume of 1 8cc  The appearance was   hypoechoic  2  Submucosal myometrium fibroid located in the posterior corpus region,   measuring 4 6 x 3 1 x 4 3cm with a volume of 32 1cc  The appearance was   hypoechoic  ADNEXA      The left ovary appeared normal and measured 4 2 x 3 7 x 3 4 cm with a   volume of 27 6 cc  The right ovary appeared normal and measured 4 3 x 3 7   x 1 8 cm with a volume of 15 0 cc        IMPRESSION      Rebolledo IUP   19 weeks and 1 day by this ultrasound  (DAYSI=NOV 1 2016)   Transverse presentation   Fetal growth appeared normal   Normal anatomy survey   Regular fetal heart rate of 150 bpm   Anterior, fundal placenta   No placenta previa      GENERAL COMMENT      On exam today the patient appears well, in no acute distress, and denies   any complaints  Her abdomen is non-tender  The patient had Sequential Screening at our Center   Her risk for trisomy   21 before screening was 1:670, after screening her risk is 1:016972; her   risk for Trisomy 25 before screening was 1:600, after screening her risk   is 1:54206  The open neural tube defect risk after screening is 1:6000  The fetal anatomic survey is complete  There is no sonographic evidence   of fetal abnormalities at this time  Good fetal movement and tone are   seen  The amniotic fluid volume appears normal   The placenta is anterior   and it appears sonographically normal   A transvaginal ultrasound was   performed to assess the cervix, which was not seen well transabdominally  The cervical length was 4 3 centimeters, which is normal for the current   gestational age  There was no significant funneling or dynamic changes   appreciated  The patient was informed of today's findings and all of her   questions were answered  The limitations of ultrasound were reviewed with   the patient, which she accepts  We talked about the patient's current state of her diabetes  She   continues to maintain  contact with our diabetes in pregnancy program and   met with our diabetic educator if her today's ultrasound in order to   initiate evening Levemir  Her fastings remained elevated and therefore we   started her on 26 units of Levemir in the evening in order to better   control her fasting blood sugar  We discussed followup in detail and   recommend patient return in approximately 8 weeks for a growth ultrasound        Please note, in addition to the time spent discussing the results of the   ultrasound, I spent approximately 10 minutes of face-to-face time with the   patient, greater than 50% of which was spent in counseling and the   coordination of care for this patient  Thank you very much for allowing us to participate in the care of this   very nice patient  Should you have any questions, please do not hesitate   to contact our office  ELEAZAR Simeon M D     Electronically signed 06/09/16 08:37

## 2018-01-16 NOTE — PROGRESS NOTES
AUG 2 2016         RE: Felipa Muro Nicholas                        To: [facility]  MR#: [id number]                                    [address]   : [address] [facility]                                  [address]   ENC: [id number] Thomson, [address]   (Exam #: [id number])                           Fax: [phone]      The LMP of this 27year old,  G4, P1-0-2-1 patient was 2016, giving   her an DAYSI of OCT 29 2016 and a current gestational age of 32 weeks 3 days   by dates  A sonographic examination was performed on AUG 2 2016 using real   time equipment  The ultrasound examination was performed using abdominal   technique  The patient has a BMI of 41 5  Her blood pressure today was   99/68  Earliest ultrasound found in her record: 16 10w4d 10/31/16 DAYSI            Felipa Muro has no complaints today  She reports regular fetal movements and   denies problems related to hypertension,   labor, or vaginal   bleeding  Elena's overall blood glucose control recently has been good,   currently on a regimen of 52 units of Lantus at night  Cardiac motion was observed at 146 bpm       INDICATIONS      morbid obesity   diabetes, gestational, class A2      Exam Types      Level I      RESULTS      Fetus # 1 of 1   Vertex presentation   Fetal growth appeared normal   Placenta Location = Anterior   No placenta previa   Placenta Grade = II      MEASUREMENTS (* Included In Average GA)      AC              22 8 cm        27 weeks 0 days* (39%)   BPD              6 8 cm        27 weeks 2 days* (39%)   HC              24 3 cm        26 weeks 1 day * (12%)   Femur            5 0 cm        27 weeks 1 day * (36%)      HC/AC           1 07   FL/AC           0 22   FL/BPD          0 74   EFW (Ac/Fl/Hc)  1013 grams - 2 lbs 4 oz                 (41%)      THE AVERAGE GESTATIONAL AGE is 26 weeks 6 days +/- 14 days        AMNIOTIC FLUID      Q1: 5 8      Q2: 6 3      Q3: 3 2      Q4: 3 2   NEVAEH Total = 18 5 cm   Amniotic Fluid: Normal      ANATOMY DETAILS      Visualized Appearing Sonographically Normal:   HEAD: (Calvarium, BPD Level, Cavum, Lateral Ventricles, Choroid Plexus,   Cerebellum);    FACE/NECK: (Nose/Lips, Face);    TH  CAV : (Diaphragm); HEART: (Four Chamber View, Proximal Left Outflow, Proximal Right Outflow,   Short Axis of Greater Vessels, Interventricular Septum, Interatrial   Septum, Cardiac Protection);    ABD  CAV , STOMACH, RIGHT KIDNEY, LEFT KIDNEY,   BLADDER, PLACENTA      IMPRESSION      Rebolledo IUP   26 weeks and 6 days by this ultrasound  (DAYSI=NOV 2 2016)   Vertex presentation   Fetal growth appeared normal   Regular fetal heart rate of 146 bpm   Anterior placenta   No placenta previa      GENERAL COMMENT      No fetal structural abnormality is identified on the Level I survey today  Fetal interval growth and amniotic fluid volume are normal       Today's ultrasound findings and suggested follow-up were discussed in   detail with Fran Rosales  She will return to the [address] [place] [address] at 32 weeks   gestation to assess interval growth and begin twice per week fetal non   stress testing for the indication of gestational diabetes, insulin   requiring  Daily third trimester fetal kick counting was discussed at the   visit today  Fran Rosales should continue to maintain contact with the Diabetes   and Pregnancy program in the [address] [place] [address] at least once a week for   review of her blood glucose values and adjustment of insulin doses  Delivery is recommended at 44 to 40 weeks gestation, earlier if otherwise   clinically indicated  The face to face time, in addition to time spent discussing ultrasound   results, was 10 minutes, greater than 50% of which was spent during   counseling and coordination of care  ELEAZAR Soliz M D     Maternal-Fetal Medicine   Electronically signed 08/02/16 14:35

## 2018-01-16 NOTE — MISCELLANEOUS
Message   Recorded as Task   Date: 10/18/2016 01:17 PM, Created By: Shell Morales   Task Name: Follow Up   Assigned To: Shell Morales   Regarding Patient: Zina Salinas, Status: Active   Comment:    Shell Morales - 18 Oct 2016 1:17 PM     TASK CREATED  can you please call tomorrow first thing and schedule pt for induction on 10/26/16, 39+ wks, gestational diabetes  thank you   Valorie Bansal - 18 Oct 2016 2:52 PM     TASK REPLIED TO: Previously Assigned To [facility] OB,Team  Will do I made a note  Active Problems    1  BMI 40 0-44 9, adult (V85 41) (Z68 41)   2  Breast feeding status of mother (V24 1) (Z39 1)   3  Chronic hypertension in pregnancy (642 00) (O10 919)   4  High risk pregnancy due to previous abortions (V23 2) (O09 299)   5  Insulin controlled gestational diabetes mellitus (GDM) in third trimester (648 83)   (O24 414)   6  Maternal morbid obesity, antepartum, third trimester (649 13,278 01) (O99 213,E66 01)   7  Migraines (346 90) (G43 909)   8  Pregnancy, obstetrical care (V22 1) (Z34 90)   9  Proteinuria affecting pregnancy in third trimester (646 23,791 0) (O12 13)   10  Supervision of high risk pregnancy in third trimester (V23 9) (O09 93)    Current Meds   1  Breast Pump Miscellaneous; Therapy: 39POV2707 to Recorded   2  CVS DHA Prenatal CAPS; Therapy: (Recorded:08Apr2016) to Recorded   3  HumaLOG KwikPen 100 UNIT/ML Subcutaneous Solution Pen-injector; Humalog kwik   pen 4 units TID with meals  Titrate as directed; Therapy: 88RUY5362 to (Evaluate:20Jan2017)  Requested for: 26EUQ6268; Last   Rx:64Yyu5128 Ordered   4  MetFORMIN HCl - 500 MG Oral Tablet; Take one tab daily with breakfast and two tablets   with dinner; Therapy: 75FEK0645 to (Binh Momin)  Requested for: 00SVS9669; Last   Rx:73Aly4792 Ordered   5  NovoFine 30G X 8 MM Miscellaneous; QID with insulin injections; Therapy: 01BIV5711 to (Last Rx:81Jzu0963)  Requested for: 23ESO2482 Ordered   6   OneTouch Delica Lancets 61G Miscellaneous; TEST 4 TIMES DAILY; Therapy: 78RXU7341 to (Evaluate:85Uyj1216)  Requested for: 01XIJ4315; Last   Rx:13Qax8341 Ordered   7  OneTouch Verio In [employer]; test 4 times daily; Therapy: 00OHJ2488 to (Evaluate:37Zza5447)  Requested for: 26QEQ0261; Last   Rx:35Zyn4289 Ordered   8  Toujeo SoloStar 300 UNIT/ML Subcutaneous Solution Pen-injector; inject 144 units at   HS; titrate twice weekly as directed; Therapy: 10LNA5975 to Yoli Paulo)  Requested for: 68JKJ6284; Last   Rx:32Awk0543 Ordered    Allergies    1  No Known Environmental Allergies   2   No Known Food Allergies    Signatures   Electronically signed by : Gustavo April, ; Oct 18 2016  2:52PM EST                       (Author)

## 2018-01-16 NOTE — PROGRESS NOTES
Active Problems    1  BMI 40 0-44 9, adult (V85 41) (Z68 41)   2  Chronic hypertension in pregnancy (642 00) (O10 919)   3  High risk pregnancy due to previous abortions (V23 2) (O09 299)   4  Insulin controlled gestational diabetes mellitus (GDM) in third trimester (648 83)   (O24 414)   5  Maternal morbid obesity, antepartum, third trimester (649 13,278 01) (O99 213,E66 01)   6  Proteinuria affecting pregnancy in third trimester (646 23,791 0) (O12 13)   7  Supervision of high risk pregnancy in third trimester (V23 9) (O09 93)    Allergies    1  No Known Environmental Allergies   2  No Known Food Allergies    Vitals  Signs   Recorded: 23GPM2819 34:10OB   Systolic: 580, LUE, Sitting  Diastolic: 72, LUE, Sitting  Pain Scale: 0  Height: 5 ft 4 in  Weight: 244 lb 9 6 oz  BMI Calculated: 41 99  BSA Calculated: 2 13  BP Cuff Size: Large    Procedure    G/P 4/1   [facility] 10/29/2016   EGA 35 3/7   /72   Indication: gestational diabetes, chronic hypertension and morbid obesity  proteinuria   Duration of Test 20 minutes  Result: Reactive and > 15 bpm with movement  Baseline Rate 140 bpm    Deceleration: None  Uterine Activity: None  Required # Stimuli Response: No    Fetal kick counts were reviewed with the patient  Recommend NST: twice weekly  Recommend NEVAEH: weekly  Current Meds   1  CVS DHA Prenatal CAPS; Therapy: (Recorded:08Apr2016) to Recorded   2  HumaLOG KwikPen 100 UNIT/ML Subcutaneous Solution Pen-injector; Humalog kwik   pen 4 units TID with meals  Titrate as directed; Therapy: 21GVD4903 to (Evaluate:20Jan2017)  Requested for: 09BCK4215; Last   Rx:85Ljw1850 Ordered   3  Lantus SoloStar 100 UNIT/ML Subcutaneous Solution Pen-injector; 52 units at hs, titrate   weekly as directed; Therapy: 62AKX4249 to (Last Rx:03Nam3569)  Requested for: 28Jul2016 Ordered   4  MetFORMIN HCl - 500 MG Oral Tablet; Take one tab daily with dinner;    Therapy: 16UBY6014 to (Codey Kilpatrick)  Requested for: 75PQA2128; Last   Rx:17Qgk8584 Ordered   5  NovoFine 30G X 8 MM Miscellaneous; daily with insulin injection; Therapy: 35OSJ7083 to (Michael Gildford)  Requested for: 06LMO6859; Last   Rx:2016 Ordered   6  OneTouch Delica Lancets 62C Miscellaneous; TEST 4 TIMES DAILY; Therapy: 31CBX6572 to (Evaluate:77Hhg8297)  Requested for: 94STM9068; Last   Rx:82Ekn8441 Ordered   7  OneTouch Verio In [employer]; test 4 times daily; Therapy: 34LSE5812 to (Evaluate:97Sie4058)  Requested for: 15MSB2870; Last   HS:01DRF5590 Ordered    Future Appointments    Date/Time Provider Specialty Site   10/14/2016 03:00 PM ALEXANDER Andino   Obstetrics/Gynecology [facility]   10/21/2016 12:00 PM Jewels Mark DO Obstetrics/Gynecology [facility]   2016 11:00 AM  [address] [address], Schedule   [facility]   2016 11:30 AM  [id number] [place] [address], Schedule  [address] [facility]   10/04/2016 11:00 AM  [address] [place] [address], Schedule  [address] [facility]   10/07/2016 11:00 AM  [address] [address], Schedule  [address] [facility]   10/07/2016 11:30 AM  [address] [place] [address], Schedule  [address] [facility]   10/04/2016 01:00 PM [facility]   2016 09:15 AM [facility] 1st Year Resident, Schedule   [address]     Signatures   Electronically signed by : Yogesh Lopez, ; Sep 27 2016 11:55AM EST                       (Author)    Electronically signed by : MIKA Marsh ,MD; Sep 27 2016  9:50PM EST                       (Author)

## 2018-01-17 NOTE — MISCELLANEOUS
Message   Recorded as Task   Date: 05/10/2016 03:51 PM, Created By: Marta Pritchard   Task Name: Review Document   Assigned To: Deedee Sy   Regarding Patient: Shayan James, Status: In Progress   Darron Juares - 10 May 2016 3:51 PM     TASK CREATED  please call Yolis Hanson- need diabetic  pathways   John Band - 10 May 2016 4:34 PM     TASK REPLIED TO: Previously Assigned To Dequan for pt to call office tomorrow  John Band - 10 May 2016 4:34 PM     TASK IN PROGRESS   John Band - 11 May 2016 11:30 AM     TASK REPLIED TO: Previously Assigned To [address] with pt  Aware DM pathways will call in next couple of days  All questions answered  Active Problems    1  Abnormal glucose in pregnancy, antepartum (648 83) (O99 810)   2  Encounter for routine gynecological examination (V72 31) (Z01 419)   3  Encounter for supervision of other normal pregnancy in first trimester (V22 1) (Z34 81)   4  Encounter to determine fetal viability of pregnancy, not applicable or unspecified fetus   (V23 87) (O36 80X0)   5  H/O gestational diabetes in prior pregnancy, currently pregnant (V23 49)   (O09 299,Z86 32)   6  High risk pregnancy due to previous abortions (V23 2) (O09 299)   7  History of elective  (V13 29) (Z87 42)   8  Migraines (346 90) (G43 909)   9  Pregnancy complicated by obesity (300 77,874 99) (O99 210,E66 9)   10  Pregnancy, obstetrical care (V22 1) (Z34 90)    Current Meds   1  CVS DHA Prenatal CAPS; Therapy: (Recorded:2016) to Recorded    Allergies    1  No Known Environmental Allergies   2   No Known Food Allergies    Plan  Abnormal glucose in pregnancy, antepartum, Pregnancy, obstetrical care    · * 1 - SL  DIABETES EDUCATION OR MNT REFERRAL Physician Referral   Consult  Status: Unauthorized - Requires Signature  Requested for: 65BHP6889  Barriers : No  for Diabetes and Pregnancy Education (Registered Dietitian to calculate calorie      needs) Comprehensive two 1-hour sessions : Yes  date : 78UUL7896  : [id number]  Care Summary provided  : Yes    Signatures   Electronically signed by : Zaire Jacobs, ; May 11 2016 11:30AM EST                       (Author)

## 2018-01-17 NOTE — MISCELLANEOUS
Message   Recorded as Task   Date: 2016 11:30 AM, Created By: [facility]   Task Name: Care Coordination   Assigned To: [facility]   Regarding Patient: Lendell Essex, Status: Active   CommentHamilton Caruso - 11 May 2016 11:30 AM     TASK CREATED  Pt failed fasting glucose with 3 hr GTT  Aware you will be calling her to schedule  Thank you! Olvin Rodriguez - 11 May 2016 11:44 AM     TASK REPLIED TO: Previously Assigned To  diabetic education,team  pt is scheduled for gdm education on 16 @1pm    thank you for the referral         Active Problems    1  Abnormal glucose in pregnancy, antepartum (648 83) (O99 810)   2  Encounter for routine gynecological examination (V72 31) (Z01 419)   3  Encounter for supervision of other normal pregnancy in first trimester (V22 1) (Z34 81)   4  Encounter to determine fetal viability of pregnancy, not applicable or unspecified fetus   (V23 87) (O36 80X0)   5  H/O gestational diabetes in prior pregnancy, currently pregnant (V23 49)   (O09 299,Z86 32)   6  High risk pregnancy due to previous abortions (V23 2) (O09 299)   7  History of elective  (V13 29) (Z87 42)   8  Migraines (346 90) (G43 909)   9  Pregnancy complicated by obesity (683 36,882 06) (O99 210,E66 9)   10  Pregnancy, obstetrical care (V22 1) (Z34 90)    Current Meds   1  CVS DHA Prenatal CAPS; Therapy: (Recorded:2016) to Recorded    Allergies    1  No Known Environmental Allergies   2   No Known Food Allergies    Signatures   Electronically signed by : Joy Carrillo, ; May 11 2016 12:08PM EST                       (Author)

## 2018-01-17 NOTE — PROGRESS NOTES
OCT 18 2016         RE: Vivian Peterson                        To: [facility]  MR#: [id number]                                    [address]   : [place] [address]                                  [address]   ENC: [id number]                             Marychuy Churchillr, [address]   (Exam #: [id number])                          Fax: [phone]      The LMP of this 27year old,  G4, P1-0-2-1 patient was 2016, giving   her an DAYSI of OCT 29 2016 and a current gestational age of 37 weeks 3 days   by dates  A sonographic examination was performed on OCT 18 2016 using   real time equipment  The ultrasound examination was performed using   abdominal technique  The patient has a BMI of 42 2  Her blood pressure   today was 120/57  Earliest ultrasound found in her record: 16 10w4d 10/31/16 DAYSI      Problem list   1  Gestational diabetes - insulin   2  Morbid obesity   3  Chronic hypertension - no meds      Cardiac motion was observed at 148 bpm       INDICATIONS      diabetes, gestational, class A2   morbid obesity      Exam Types      Amniotic Fluid Index      RESULTS      Fetus # 1 of 1   Vertex presentation   Placenta Location = Anterior   No placenta previa   Placenta Grade = II      The NST was reactive with variable decelerations  AMNIOTIC FLUID      Q1: 5 5      Q2: 3 2      Q3: 2 4      Q4: 5 1   NEVAEH Total = 16 1 cm   Amniotic Fluid: Normal      BIOPHYSICAL PROFILE      The Biophysical Profile score was 10/10  Breathin  Movement: 2  Tone: 2  AFV: 2  NST: 2   The NST was reactive with variable decelerations  IMPRESSION      Rebolledo IUP   Vertex presentation   Regular fetal heart rate of 148 bpm   Anterior placenta   No placenta previa      GENERAL COMMENT      There was a brief sharp variable to 95 with no recurrence and lasted only   25 seconds and there was no interval change  A BPP and 20 more minutes of   strip were reassuring   Her follow up care should include twice weekly   NST's and a once weekly amniotic fluid assessment, along with her daily   kick counts  ELEAZAR Sellers M D     Maternal-Fetal Medicine   Electronically signed 10/18/16 11:48

## 2018-01-17 NOTE — PROGRESS NOTES
2016         RE: Fritz Peterson                        To: [facility]  MR#: [id number]                                    [address]   : [place] [address] [facility]                                  [address]   ENC: [id number]:ROBB Thomson, [address]   (Exam #: [id number])                           Fax: [phone]      The LMP of this 27year old,  G4, P1-*-2-1 patient was 2016, giving   her an DAYSI of OCT 29 2016 and a current gestational age of 16 weeks 6 days   by dates  A sonographic examination was performed on 2016 using   real time equipment  The ultrasound examination was performed using   abdominal technique  The patient has a BMI of 41 5  Her blood pressure   today was 102/69  Earliest ultrasound found in her record: 16 10w4d 10/31/16 DAYSI      Thank you very much for referring this very nice patient for a    consultation and genetic screening ultrasound  This is the patient's   second pregnancy  She has a history of morbid obesity with a BMI of   greater than 40  She delivered on in January of last year a 7 lbs  10 oz    male infant full-term  That pregnancy was complicated by early diagnosis   of gestational diabetes around 17 weeks from [facility]  She   transferred to our institution at around 22 weeks at which point she was   followed by the  Center for the diagnosis of early gestational   diabetes  She developed a shortened cervix and was placed on vaginal   progesterone sometime around 28 weeks  She ultimately delivered   full-term  Her gynecologic history is significant for 2 abortions, her   first at around 24 weeks at her second at 10 weeks, in  and    respectively  She did not have a nonpregnant Glucola screen to determine   if she has pregestational diabetes  She has no other significant surgical   history  She denies the current use of tobacco, alcohol, or drugs  Her   medications include vitamins and she has no known drug allergies  Her   mother has breast cancer  No other family members have significant   congenital birth defects  Review of systems is otherwise negative  On   exam, the patient appears well, in no acute distress, and her abdomen is   nontender  Multiple longitudinal and transverse sections revealed a pisano   intrauterine pregnancy with the fetus in variable presentation  The   placenta is anterior in implantation, grade 0 in appearance  Cardiac motion was observed at 162 bpm       INDICATIONS      first trimester genetic screening   diabetes, gestation-previous pregnancy   morbid obesity      Exam Types      Level I      RESULTS      Fetus # 1 of 1   Variable presentation   Fetal growth appeared normal      MEASUREMENTS (* Included In Average GA)      CRL              6 2 cm        12 weeks 3 days*   Nuchal Trans    1 50 mm      THE AVERAGE GESTATIONAL AGE is 12 weeks 3 days +/- 7 days  UTERINE ARTERIES                                  S/D   PI    RI    NOTCH       Left Uterine Artery        3 67  1 58  0 73       Right Uterine Artery       3 27  1 45  0 69      ANATOMY COMMENTS      Anatomic detail is limited at this gestational age  The yolk sac was not   noted  The fetal cranium appeared normal in shape and the nuchal   translucency was normal in size (1 5mm)  The nasal bone appears to be   present  The intracranial anatomy was unremarkable  Evaluation of the   spine revealed no obvious evidence for a neural tube defect  Anatomy of   the fetal thorax appeared within normal limits  The cardiac rhythm was   regular  Within the abdomen, stomach & bladder were visualized and the   abdominal wall appeared intact  A three vessel cord appears to be present  Active movement of the fetal body & extremities was seen  There is no   suspicion of a subchorionic bleed    The placental cord insertion was   normal    The uterine artery Dopplers are norml for this gestational age  There is no suspicion of a uterine myoma  Free fluid is not seen in the   posterior cul-de-sac  ADNEXA      The left ovary appeared normal and measured 3 4 x 2 7 x 3 3 cm with a   volume of 15 8 cc  The right ovary appeared normal and measured 2 5 x 1 9   x 1 9 cm with a volume of 4 7 cc       AMNIOTIC FLUID      Amniotic Fluid: Normal      IMPRESSION      Rebolledo IUP   12 weeks and 3 days by this ultrasound  (DAYSI=NOV 1 2016)   Variable presentation   Fetal growth appeared normal   Regular fetal heart rate of 162 bpm   Anterior placenta      GENERAL COMMENT      We discussed the options for genetic screening, including but not limited   to first trimester screening, second trimester screening, combined first   and second trimester screening, noninvasive prenatal testing (NIPT) for   patients at high risk and diagnostic screening through the use of CVS and   amniocentesis  We discussed the risks and benefits of each approach   including the sensitivities and false positive rates as well as the   difference between a screening test and a diagnostic test   At the   conclusion of our discussion the patient elected Sequential Screening to   delineate her risk for fetal aneuploidy  A maternal blood sample was   obtained on the day of sonogram   The first trimester portion of the   screening results, encompassing age, nuchal translucency, and biochemistry   should be available within one week of testing and will be reported from   [facility]   The second stage of sequential screening should be completed   between the 15th and 21st week of pregnancy (ideally between 16-18 weeks)  The implications of obesity and pregnancy are significant    The level of   obesity is directly related to the risk of adverse pregnancy outcomes   including but not limited to, risk of diabetes, hypertensive disorders of   pregnancy, macrosomia, intrauterine growth restriction, labor and shoulder   dystocias,  section, and increased risk of stillbirth  Recommend   discussing the current weight gain recommendations for women with obesity   and discussing good dietary practices as well as the safety of exercise in   pregnancy  I recommend the patient gain no more than 10 pounds throughout   her entire pregnancy, increase her exercise and follow healthy dietary   habits  Consider referral to a dietitian should the patient have   difficulty following the aforementioned recommendations  Recommend third   trimester growth ultrasounds to screen for fetal growth problems as well   as ensuring the patient is appropriately screened for pregestational and   gestational diabetes  The patient has not yet received results from her   early Glucola screening  Given that she had gestational diabetes   requiring over 220 units of insulin during her prior pregnancy, she is at   very high risk for overt diabetes  A hemoglobin A1c may help determine   her risk factors for congenital birth defects if she screams positive for   gestational diabetes  The patient was questioning whether or not she needed cervical screening   given that she developed a short cervix with her prior pregnancy  I   discussed with her that we will screen her at 20 weeks and go from there   depending on that result given that she ultimately delivered full-term  She is not a candidate for intramuscular progesterone  Recommend an anatomy ultrasound be scheduled for 20 weeks gestation  Please note, in addition to the time spent discussing the results of the   ultrasound, I spent approximately 20 minutes of face-to-face time with the   patient, greater than 50% of which was spent in counseling and the   coordination of care for this patient  Thank you very much for allowing us to participate in the care of this   very nice patient  Should you have any questions, please do not hesitate   to contact our office  ELEAZAR Dixon M D     Electronically signed 04/25/16 16:28

## 2018-01-18 NOTE — PROGRESS NOTES
SEP 9 2016         RE: Cali SawyersMekaCapone                        To: [facility]  MR#: [id number]                                    [address]   : [place] [address]                                  [address]   ENC: [id number]:[facility]                             [place], [address]   (Exam #: [id number])                           Fax: [phone]      The LMP of this 27year old,  G4, P1-0-2-1 patient was 2016, giving   her an DAYSI of OCT 29 2016 and a current gestational age of 26 weeks 6 days   by dates  A sonographic examination was performed on SEP 9 2016 using real   time equipment  The ultrasound examination was performed using abdominal   technique  The patient has a BMI of 41 9  Her blood pressure today was   122/59  Earliest ultrasound found in her record: 16 10w4d 10/31/16 DAYSI      Cardiac motion was observed at 145 bpm       INDICATIONS      diabetes, gestational, class A2      Exam Types      Amniotic Fluid Index   NST      RESULTS      Fetus # 1 of 1   Vertex presentation   Placenta Location = Anterior   No placenta previa   Placenta Grade = II      The NST was reactive with no decelerations  AMNIOTIC FLUID      Q1: 7 6      Q2: 3 7      Q3: 3 9      Q4: 4 1   NEVAEH Total = 19 3 cm   Amniotic Fluid: Normal      IMPRESSION      Rebolledo IUP   Vertex presentation   Regular fetal heart rate of 145 bpm   Anterior placenta   No placenta previa      RECOMMENDATION      NEVAEH: 1 Week   NST: 2X per week      ELEAZAR Uriarte M D     Maternal-Fetal Medicine   Electronically signed 16 11:33

## 2018-01-18 NOTE — PROGRESS NOTES
OCT 11 2016         RE: Analisa Mitchell Nicholas                        To: [facility]  MR#: [id number]                                    [address]   : [place] [address] [facility]                                  [address]   ENC: [id number]:AQUILINO Thomson, [address]   (Exam #: [id number])                           Fax: [phone]      The LMP of this 27year old,  G4, P1-0-2-1 patient was 2016, giving   her an DAYSI of OCT 29 2016 and a current gestational age of 42 weeks 3 days   by dates  A sonographic examination was performed on OCT 11 2016 using   real time equipment  The ultrasound examination was performed using   abdominal technique  The patient has a BMI of 42 0  Her blood pressure   today was 115/65  Earliest ultrasound found in her record: 16 10w4d 10/31/16 DAYSI      Problem list   1  Gestational diabetes - insulin   2  Morbid obesity   3  Chronic hypertension - no meds      Analisa Mitchell has no complaints today  She reports regular fetal movement and   denies problems related to vaginal bleeding or  labor  She has not   experienced exacerbation of chronic hypertension or clinical suspicion of   evolving preeclampsia  She is currently treated with 176 units of Toujeo   at night, 4 units of Humalog with meals, 500 mg of metformin at breakfast   and 1000 mg of metformin at dinner  Cardiac motion was observed at 162 bpm       INDICATIONS      diabetes, gestational, class A2   morbid obesity      Exam Types      Level I   NST      RESULTS      Fetus # 1 of 1   Vertex presentation   Fetal growth appeared normal   Placenta Location = Anterior   No placenta previa   Placenta Grade = II      The NST was reactive with no decelerations        MEASUREMENTS (* Included In Average GA)      AC              34 2 cm        38 weeks 2 days* (69%)   BPD              8 9 cm        36 weeks 0 days* (36%)   HC              32 2 cm        35 weeks 6 days* (21%) Femur            7 3 cm        36 weeks 6 days* (52%)      HC/AC           0 94   FL/AC           0 21   FL/BPD          0 82   EFW (Ac/Fl/Hc)  3221 grams - 7 lbs 2 oz                 (55%)      THE AVERAGE GESTATIONAL AGE is 36 weeks 6 days +/- 21 days  AMNIOTIC FLUID      Q1: 4 7      Q2:          Q3: 4 0      Q4: 3 8   NEVAEH Total = 12 5 cm   Amniotic Fluid: Normal      ANATOMY DETAILS      Visualized Appearing Sonographically Normal:   HEAD: (Calvarium, BPD Level, Lateral Ventricles, Cerebellum);    TH  CAV :   (Diaphragm); HEART: ([employer], 3 Vessel Trachea,   Interventricular Septum, Interatrial Septum, Cardiac Axis, Cardiac   Position);    STOMACH, RIGHT KIDNEY, LEFT KIDNEY, BLADDER, PLACENTA      IMPRESSION      Rebolledo IUP   36 weeks and 6 days by this ultrasound  (DAYSI=NOV 2 2016)   Vertex presentation   Fetal growth appeared normal   Regular fetal heart rate of 162 bpm   Anterior placenta   No placenta previa      GENERAL COMMENT      No fetal structural abnormality is identified on the Level I survey today   in a difficult and limited study secondary to the constraints related to   the late gestational age and maternal body habitus  Fetal interval growth   and amniotic fluid volume are normal       Today's ultrasound findings and suggested follow-up were discussed in   detail with Valeriy Smith  Suggested follow-up fetal surveillance includes   continuation of twice per week NST's, weekly NEVAEH's, and daily kick counts  Follow-up [facility] ultrasound evaluation is otherwise recommended as   clinically indicated  Valeriy Smith should continue to maintain contact with the   Diabetes and Pregnancy program in the [address] [place] [address] at least once a   week for review of her blood glucose values and adjustment of insulin   doses  Delivery is recommended at 44 to 40 weeks gestation, sooner if   otherwise clinically indicated        The face to face time, in addition to time spent discussing ultrasound   results, was approximately 10 minutes, greater than 50% of which was spent   during counseling and coordination of care  ELEAAZR Talbert M D     Maternal-Fetal Medicine   Electronically signed 10/11/16 11:29

## 2018-01-18 NOTE — MISCELLANEOUS
Message  Return to work or school:   Poli Munguia is under my professional care  She was seen in my office on 06/02/2016        Tania Sim CNM        Signatures   Electronically signed by : ALEXANDER Delong ; Jun 2 2016  4:46PM EST

## 2018-01-23 VITALS
HEIGHT: 64 IN | DIASTOLIC BLOOD PRESSURE: 78 MMHG | SYSTOLIC BLOOD PRESSURE: 118 MMHG | WEIGHT: 255 LBS | BODY MASS INDEX: 43.54 KG/M2

## 2018-02-14 ENCOUNTER — HOSPITAL ENCOUNTER (EMERGENCY)
Facility: HOSPITAL | Age: 33
Discharge: HOME | End: 2018-02-14
Attending: EMERGENCY MEDICINE | Admitting: EMERGENCY MEDICINE
Payer: COMMERCIAL

## 2018-02-14 VITALS
TEMPERATURE: 99 F | SYSTOLIC BLOOD PRESSURE: 160 MMHG | RESPIRATION RATE: 16 BRPM | OXYGEN SATURATION: 100 % | BODY MASS INDEX: 42.91 KG/M2 | HEART RATE: 90 BPM | DIASTOLIC BLOOD PRESSURE: 72 MMHG | WEIGHT: 250 LBS

## 2018-02-14 DIAGNOSIS — M79.641 HAND PAIN, RIGHT: Primary | ICD-10-CM

## 2018-02-14 PROCEDURE — 99283 EMERGENCY DEPT VISIT LOW MDM: CPT

## 2018-02-14 RX ORDER — PREDNISONE 20 MG/1
60 TABLET ORAL ONCE
Status: COMPLETED | OUTPATIENT
Start: 2018-02-14 | End: 2018-02-14

## 2018-02-14 RX ORDER — PREDNISONE 20 MG/1
60 TABLET ORAL DAILY
Qty: 15 TABLET | Refills: 0 | Status: SHIPPED | OUTPATIENT
Start: 2018-02-14 | End: 2018-02-19

## 2018-02-14 RX ADMIN — PREDNISONE 60 MG: 20 TABLET ORAL at 19:20

## 2018-02-14 NOTE — DISCHARGE INSTRUCTIONS
Arthralgia   WHAT YOU NEED TO KNOW:   Arthralgia is pain in one or more joints, with no inflammation  It may be short-term and get better within 6 to 8 weeks  Arthralgia can be an early sign of arthritis  Arthralgia may be caused by a medical condition, such as a hormone disorder or a tumor  It may also be caused by an infection or injury  DISCHARGE INSTRUCTIONS:   Medicines: The following medicines may  be ordered for you:  · Acetaminophen  decreases pain  Ask how much to take and how often to take it  Follow directions  Acetaminophen can cause liver damage if not taken correctly  · NSAIDs  decrease pain and prevent swelling  Ask your healthcare provider which medicine is right for you  Ask how much to take and when to take it  Take as directed  NSAIDs can cause stomach bleeding and kidney problems if not taken correctly  · Pain relief cream  decreases pain  Use this cream as directed  · Take your medicine as directed  Contact your healthcare provider if you think your medicine is not helping or if you have side effects  Tell him of her if you are allergic to any medicine  Keep a list of the medicines, vitamins, and herbs you take  Include the amounts, and when and why you take them  Bring the list or the pill bottles to follow-up visits  Carry your medicine list with you in case of an emergency  Follow up with your healthcare provider or specialist as directed:  Write down your questions so you remember to ask them during your visits  Self-care:   · Apply heat  to help decrease pain  Use a heating pad or heat wrap  Apply heat for 20 to 30 minutes every 2 hours for as many days as directed  · Rest  as much as possible  Avoid activities that cause joint pain  · Apply ice  to help decrease swelling and pain  Ice may also help prevent tissue damage  Use an ice pack, or put crushed ice in a plastic bag   Cover it with a towel and place it on your painful joint for 15 to 20 minutes every hour or as directed  · Support  the joint with a brace or elastic wrap as directed  · Elevate  your joint above the level of your heart as often as you can to help decrease swelling and pain  Prop your painful joint on pillows or blankets to keep it elevated comfortably  · Lose weight  if you are overweight  Extra weight can put pressure on your joints and cause more pain  Ask your healthcare provider how much you should weigh  Ask him to help you create a weight loss plan  · Exercise  regularly to help improve joint movement and to decrease pain  Ask about the best exercise plan for you  Low-impact exercises can help take the pressure off your joints  Examples are walking, swimming, and water aerobics  Physical therapy:  A physical therapist teaches you exercises to help improve movement and strength, and to decrease pain  Ask your healthcare provider if physical therapy is right for you  Contact your healthcare provider or specialist if:   · You have a fever  · You continue to have joint pain that cannot be relieved with heat, ice, or medicine  · You have pain and inflammation around your joint  · You have questions or concerns about your condition or care  Return to the emergency department if:   · You have sudden, severe pain when you move your joint  · You have a fever and shaking chills  · You cannot move your joint  · You lose feeling on the side of your body where you have the painful joint  © 2017 [address] [employer]  Information is for End User's use only and may not be sold, redistributed or otherwise used for commercial purposes  All illustrations and images included in CareNotes® are the copyrighted property of [employer]  or [address]  The above information is an  only  It is not intended as medical advice for individual conditions or treatments   Talk to your doctor, nurse or pharmacist before following any medical regimen to see if it is safe and effective for you

## 2018-02-15 NOTE — ED NOTES
Discharge instructions and medications reviewed with pt  Pt verbalized understanding, with no further questions at this time  Pt ambulatory out of department, using steady gait, alone       Benjamin Kan RN  02/14/18 1928

## 2018-02-15 NOTE — ED PROVIDER NOTES
History  Chief Complaint   Patient presents with    Hand Pain     Pt reports right hand pain starting 3-4 days  Pt denies injury/trauma     28-year-old female patient presents emergency department for evaluation of hand pain  The patient states the hand pain has been well 3-4 days  The patient has no injury  The patient has no soft tissue or bony abnormality  The patient has no signs of obvious arthritis  I offered the patient x-ray but we both agree that there is nothing to x-rays the patient does not have any injury  Will treat for inflamation  History provided by:  Patient   used: No    Hand Pain   Severity:  Mild  Timing:  Constant  Progression:  Worsening  Chronicity:  New  Associated symptoms: no abdominal pain, no chest pain, no congestion, no cough, no diarrhea, no fatigue, no fever, no headaches, no loss of consciousness, no myalgias, no nausea, no rash, no rhinorrhea, no shortness of breath, no vomiting and no wheezing        Prior to Admission Medications   Prescriptions Last Dose Informant Patient Reported? Taking?    Prenatal Vit-Fe Fumarate-FA (PRENATAL VITAMIN PO)   Yes No   Sig: Take 1 tablet by mouth daily   ibuprofen (MOTRIN) 600 mg tablet   No No   Sig: Take 1 tablet by mouth every 6 (six) hours as needed for mild pain for up to 30 days      Facility-Administered Medications: None       Past Medical History:   Diagnosis Date    Asthma     childhood    Diabetes mellitus ([facility] [address] )     gestational    Hypertension     Migraine     Varicella     childhood       Past Surgical History:   Procedure Laterality Date    INDUCED          Family History   Problem Relation Age of Onset    Cancer Mother     Heart disease Mother     Stroke Mother     Diabetes Father     Asthma Sister     Asthma Brother     Diabetes Paternal Grandmother     Asthma Brother     Asthma Brother     Asthma Sister     Asthma Sister      I have reviewed and agree with the history as documented  Social History   Substance Use Topics    Smoking status: Never Smoker    Smokeless tobacco: Never Used    Alcohol use No        Review of Systems   Constitutional: Negative for fatigue and fever  HENT: Negative for congestion and rhinorrhea  Respiratory: Negative for cough, shortness of breath and wheezing  Cardiovascular: Negative for chest pain  Gastrointestinal: Negative for abdominal pain, diarrhea, nausea and vomiting  Musculoskeletal: Negative for myalgias  Skin: Negative for rash  Neurological: Negative for loss of consciousness and headaches  All other systems reviewed and are negative  Physical Exam  ED Triage Vitals   Temperature Pulse Respirations Blood Pressure SpO2   02/14/18 1854 02/14/18 1854 02/14/18 1854 02/14/18 1855 02/14/18 1854   99 °F (37 2 °C) 90 16 160/72 100 %      Temp Source Heart Rate Source Patient Position - Orthostatic VS BP Location FiO2 (%)   02/14/18 1854 02/14/18 1854 02/14/18 1854 02/14/18 1854 --   Oral Monitor Lying Right arm       Pain Score       02/14/18 1854       5           Orthostatic Vital Signs  Vitals:    02/14/18 1854 02/14/18 1855   BP:  160/72   Pulse: 90    Patient Position - Orthostatic VS: Lying Lying       Physical Exam   Constitutional: She is oriented to person, place, and time  She appears well-developed and well-nourished  HENT:   Head: Normocephalic and atraumatic  Right Ear: External ear normal    Left Ear: External ear normal    Eyes: Conjunctivae and EOM are normal    Neck: No JVD present  No tracheal deviation present  No thyromegaly present  Cardiovascular: Normal rate  Pulmonary/Chest: Effort normal and breath sounds normal  No stridor  Abdominal: Soft  She exhibits no distension and no mass  There is no tenderness  There is no guarding  No hernia  Musculoskeletal: Normal range of motion  She exhibits no edema, tenderness or deformity  Lymphadenopathy:     She has no cervical adenopathy  Neurological: She is alert and oriented to person, place, and time  Skin: Skin is warm  No rash noted  No erythema  No pallor  Psychiatric: She has a normal mood and affect  Her behavior is normal    Nursing note and vitals reviewed  ED Medications  Medications   predniSONE tablet 60 mg (60 mg Oral Given 2/14/18 1920)       Diagnostic Studies  Results Reviewed     None                 No orders to display              Procedures  Procedures       Phone Contacts  ED Phone Contact    ED Course  ED Course                                MDM  Number of Diagnoses or Management Options  Hand pain, right: new and requires workup     Amount and/or Complexity of Data Reviewed  Decide to obtain previous medical records or to obtain history from someone other than the patient: yes  Review and summarize past medical records: yes    Patient Progress  Patient progress: stable    CritCare Time    Disposition  Final diagnoses:   Hand pain, right     Time reflects when diagnosis was documented in both MDM as applicable and the Disposition within this note     Time User Action Codes Description Comment    2/14/2018  6:56 PM Arianna Pepper Add [E11 895] Hand pain, right       ED Disposition     ED Disposition Condition Comment    Discharge  Josephine diana discharge to home/self care      Condition at discharge: Stable        Follow-up Information     Follow up With Specialties Details Why Contact Deborah Baldwin MD Orthopedic Surgery  Call for follow up and possible nerve conduction testing [facility] [address]          Discharge Medication List as of 2/14/2018  7:15 PM      START taking these medications    Details   predniSONE 20 mg tablet Take 3 tablets (60 mg total) by mouth daily for 5 days, Starting Wed 2/14/2018, Until Mon 2/19/2018, Normal         CONTINUE these medications which have NOT CHANGED    Details   ibuprofen (MOTRIN) 600 mg tablet Take 1 tablet by mouth every 6 (six) hours as needed for mild pain for up to 30 days, Starting 10/23/2016, Until Tue 11/22/16, Print      Prenatal Vit-Fe Fumarate-FA (PRENATAL VITAMIN PO) Take 1 tablet by mouth daily, Until Discontinued, Historical Med           No discharge procedures on file      ED Provider  Electronically Signed by           Jose Alberto Aguilar DO  02/15/18 0663

## 2018-04-20 ENCOUNTER — TELEPHONE (OUTPATIENT)
Dept: OBGYN CLINIC | Facility: CLINIC | Age: 33
End: 2018-04-20

## 2018-04-20 NOTE — TELEPHONE ENCOUNTER
Spoke with Jessenia Loya at [facility], patient covered at 100% for Mirena, insertion and appt, no ded or copay    Buy and bill allowed

## 2018-04-23 NOTE — TELEPHONE ENCOUNTER
Mirena labeled and placed in [facility] med room  Pt can be scheduled for insertion,    Thank you!

## 2018-04-25 NOTE — TELEPHONE ENCOUNTER
I will bring this pts Mirena up to [facility] [address] [facility] on Friday,    Please make appt   (should I be sending these to you our [place] )

## 2018-04-27 ENCOUNTER — OFFICE VISIT (OUTPATIENT)
Dept: OBGYN CLINIC | Age: 33
End: 2018-04-27
Payer: COMMERCIAL

## 2018-04-27 VITALS
BODY MASS INDEX: 41.66 KG/M2 | DIASTOLIC BLOOD PRESSURE: 80 MMHG | SYSTOLIC BLOOD PRESSURE: 126 MMHG | HEIGHT: 64 IN | RESPIRATION RATE: 14 BRPM | WEIGHT: 244 LBS

## 2018-04-27 DIAGNOSIS — Z30.430 ENCOUNTER FOR IUD INSERTION: Primary | ICD-10-CM

## 2018-04-27 PROBLEM — N94.10 DYSPAREUNIA IN FEMALE: Status: ACTIVE | Noted: 2018-01-02

## 2018-04-27 PROCEDURE — 58300 INSERT INTRAUTERINE DEVICE: CPT | Performed by: OBSTETRICS & GYNECOLOGY

## 2018-04-27 RX ORDER — LEVONORGESTREL AND ETHINYL ESTRADIOL 0.1-0.02MG
1 KIT ORAL DAILY
COMMUNITY
Start: 2018-01-02 | End: 2018-04-27 | Stop reason: ALTCHOICE

## 2018-04-27 NOTE — PROGRESS NOTES
Assessment/Plan:    No problem-specific Assessment & Plan notes found for this encounter  Diagnoses and all orders for this visit:    Encounter for IUD insertion  -     Iud insertions    Other orders  -     Discontinue: levonorgestrel-ethinyl estradiol (AVIANE) 0 1-20 MG-MCG per tablet; Take 1 tablet by mouth daily  -     conjugated estrogens (PREMARIN) vaginal cream; Insert 1 g into the vagina 2 (two) times a week          Subjective:      Patient ID: Erika Morgan is a 28 y o  female  Patient is here for a Mirena IUD Insertion  Lot #[id number]  Expires on 10/2020  NDC# 55288-116-81        The following portions of the patient's history were reviewed and updated as appropriate:   She  has a past medical history of Asthma; Diabetes mellitus ([facility] [address] ); Hypertension; Migraine; and Varicella  She   Patient Active Problem List    Diagnosis Date Noted    Dyspareunia in female 2018     She  has a past surgical history that includes Induced   Her family history includes Asthma in her brother, brother, brother, sister, sister, and sister; Cancer in her mother; Diabetes in her father and paternal grandmother; Heart disease in her mother; Stroke in her mother  She  reports that she has never smoked  She has never used smokeless tobacco  She reports that she does not drink alcohol or use drugs  Current Outpatient Prescriptions   Medication Sig Dispense Refill    conjugated estrogens (PREMARIN) vaginal cream Insert 1 g into the vagina 2 (two) times a week       No current facility-administered medications for this visit        Current Outpatient Prescriptions on File Prior to Visit   Medication Sig    [DISCONTINUED] ibuprofen (MOTRIN) 600 mg tablet Take 1 tablet by mouth every 6 (six) hours as needed for mild pain for up to 30 days    [DISCONTINUED] Prenatal Vit-Fe Fumarate-FA (PRENATAL VITAMIN PO) Take 1 tablet by mouth daily     No current facility-administered medications on file prior to visit       She has No Known Allergies       Review of Systems      Objective:      /80 (BP Location: Left arm, Patient Position: Sitting, Cuff Size: Standard)   Resp 14   Ht 5' 4" (1 626 m)   Wt 111 kg (244 lb)   LMP 04/27/2018 (Exact Date)   BMI 41 88 kg/m²          Physical Exam    Iud insertions  Date/Time: 4/27/2018 2:43 PM  Performed by: Nikki Johnson  Authorized by: Nikki Johnson     Consent:     Consent obtained:  Verbal    Consent given by:  Patient    Procedure risks and benefits discussed: yes      Patient questions answered: yes    Procedure:     IUD type:  Mirena    Strings trimmed: yes    Post-procedure:     Patient tolerated procedure well: yes

## 2018-04-27 NOTE — PATIENT INSTRUCTIONS
Levonorgestrel (Into the uterus)   Levonorgestrel (kin-jfu-agt-RHODA-trel)  Prevents pregnancy and treats heavy menstrual bleeding  This is an intrauterine device (IUD), which is a reversible form of birth control  This IUD slowly releases levonorgestrel, a hormone  Brand Name(s): Syliva Belling, Mirena, [place]   There may be other brand names for this medicine  When This Medicine Should Not Be Used: This device is not right for everyone  Do not use it if you had an allergic reaction to levonorgestrel, or you are pregnant  How to Use This Medicine:   Device  · The IUD is usually inserted by your doctor during your monthly period  You will need to see your doctor 4 to 6 weeks after the IUD is placed and then once a year  · Your IUD has a string or "tail" that is made of plastic thread  About one or two inches of this string hangs into your vagina  You cannot see this string, and it will not cause problems when you have sex  Check your IUD after each monthly period  You may not be protected against pregnancy if you cannot feel the string or if you feel plastic  Do the following to check the placement of your IUD:  [facility] your hands with soap and warm water  Dry them with a clean towel  ¨ Bend your knees and squat low to the ground  ¨ Gently put your index finger high inside your vagina  The cervix is at the top of the vagina  Find the IUD string coming from your cervix  Never pull on the string  You should not be able to feel the plastic of the IUD itself  Wash your hands after you are done checking your IUD string  · Your doctor will need to replace your IUD after 3 years for [facility] or [facility], or after 5 years for [facility] or [place] [address]  You will also need to have it replaced if it comes out of your uterus  Drugs and Foods to Avoid:   Ask your doctor or pharmacist before using any other medicine, including over-the-counter medicines, vitamins, and herbal products    · Some medicines can affect how this device works  Tell your doctor if you are using a blood thinner (including warfarin)  Warnings While Using This Medicine:   · Tell your doctor if you are breastfeeding, or you have had a baby, miscarriage, or  in the past 3 months  Tell your doctor if you have liver disease (including tumor or cancer), breast cancer, heart or blood circulation problems, including a history of heart valve problems, heart disease, blood clotting problems, stroke, heart attack, or high blood pressure  Tell your doctor if you have problems with your immune system or have had surgery on your female organs (especially fallopian tubes)  · Tell your doctor if you have had any problems, infections, or other conditions that affected your reproductive system  There are many problems that could make an IUD a bad choice for you, including if you have fibroids, unexplained bleeding, a uterus that has an unusual shape, a recent infection, pelvic inflammatory disease, abnormal Pap test, ectopic pregnancy, cancer or suspected cancer, or an existing IUD  · There is a small chance that you could get pregnant when using an IUD, just as there is with any birth control  If you get pregnant, your doctor may remove your IUD to lower the risk of miscarriage or other problems  · This medicine may cause the following problems:  ¨ Increased risk of ectopic pregnancy (pregnancy outside the uterus)  ¨ Increased risk of a serious infection called pelvic inflammatory disease (PID)  ¨ Increased risk for ovarian cysts  ¨ Perforation (hole in the wall of your uterus), which can damage other organs  · You might have some spotting and cramping during the first weeks after the IUD has been inserted  These symptoms should decrease or go away within a few weeks up to 6 months  · You could have less bleeding or even stop having periods by the end of the first year   Call your doctor if you have a change from the regular bleeding pattern after you have had your IUD for awhile, such as more bleeding or if you miss a period (and you were having periods even with your IUD)  · An IUD can slip partly or all of the way out of your uterus  If this happens, use condoms or another form of birth control, and call your doctor right away  · This IUD will not protect you from HIV/AIDS, herpes, or other sexually transmitted diseases  · If you have the Paresh Garay or Isidoro King, tell your healthcare provider before you have an MRI test   Possible Side Effects While Using This Medicine:   Call your doctor right away if you notice any of these side effects:  · Allergic reaction: Itching or hives, swelling in your face or hands, swelling or tingling in your mouth or throat, chest tightness, trouble breathing  · Chest pain, problems with speech or walking, numbness or weakness in your arm or leg or on one side of your body  · Heavy bleeding from your vagina  · Pain during sex, or if your partner feels the hard plastic of the IUD during sex  · Severe headache, vision changes  · Stomach or pelvic pain, tenderness, or cramping that is sudden or severe  · Vaginal discharge has a bad smell, fever, chills, sores on your genitals  · Yellow skin or eyes  If you notice these less serious side effects, talk with your doctor:   · Acne or other skin changes  · Breast pain  · Change in bleeding pattern after the first few months  · Dizziness or lightheadedness after IUD is placed  · Mild itching around your vagina and genitals  If you notice other side effects that you think are caused by this medicine, tell your doctor  Call your doctor for medical advice about side effects  You may report side effects to FDA at 8-426-FDA-4569  © 2017 [address] Information is for End User's use only and may not be sold, redistributed or otherwise used for commercial purposes  The above information is an  only  It is not intended as medical advice for individual conditions or treatments  Talk to your doctor, nurse or pharmacist before following any medical regimen to see if it is safe and effective for you

## 2018-06-05 ENCOUNTER — OFFICE VISIT (OUTPATIENT)
Dept: OBGYN CLINIC | Age: 33
End: 2018-06-05
Payer: COMMERCIAL

## 2018-06-05 VITALS — BODY MASS INDEX: 41.54 KG/M2 | WEIGHT: 242 LBS | DIASTOLIC BLOOD PRESSURE: 78 MMHG | SYSTOLIC BLOOD PRESSURE: 116 MMHG

## 2018-06-05 DIAGNOSIS — N92.1 BREAKTHROUGH BLEEDING WITH IUD: Primary | ICD-10-CM

## 2018-06-05 DIAGNOSIS — T83.32XS IUD MIGRATION, SEQUELA: ICD-10-CM

## 2018-06-05 DIAGNOSIS — Z97.5 BREAKTHROUGH BLEEDING WITH IUD: Primary | ICD-10-CM

## 2018-06-05 DIAGNOSIS — Z30.433 ENCOUNTER FOR REMOVAL AND REINSERTION OF INTRAUTERINE CONTRACEPTIVE DEVICE (IUD): ICD-10-CM

## 2018-06-05 PROCEDURE — 58300 INSERT INTRAUTERINE DEVICE: CPT | Performed by: OBSTETRICS & GYNECOLOGY

## 2018-06-05 PROCEDURE — 58301 REMOVE INTRAUTERINE DEVICE: CPT | Performed by: OBSTETRICS & GYNECOLOGY

## 2018-06-05 NOTE — PROGRESS NOTES
Assessment/Plan:    Breakthrough bleeding with IUD  Mirena placed 2 months ago- had two heavy menses and has daily spotting  On exam IUD palpated in the cervix  Removed and new Mirena reinserted  Diagnoses and all orders for this visit:    Breakthrough bleeding with IUD    IUD migration, sequela    Encounter for removal and reinsertion of intrauterine contraceptive device (IUD)    Other orders  -     levonorgestrel (MIRENA) 20 MCG/24HR IUD; 1 each by Intrauterine route once          Subjective:      Patient ID: Fidencio Murcia is a 28 y o  female  Mirena follow up appt    Since insertion had two heavy menses and daily spotting  Has mild cramping  + dyspareunia but this has been a chronic problem- but this feels different  Has fullness or pressure feeling deeper in pelvis  On exam: string visualized, IUD palpated in the cervix  The following portions of the patient's history were reviewed and updated as appropriate:   She  has a past medical history of Asthma; Diabetes mellitus ([address] ); Hypertension; Migraine; and Varicella  She   Patient Active Problem List    Diagnosis Date Noted    Breakthrough bleeding with IUD 2018    Dyspareunia in female 2018     She  has a past surgical history that includes Induced   Her family history includes Asthma in her brother, brother, brother, sister, sister, and sister; Cancer in her mother; Diabetes in her father and paternal grandmother; Heart disease in her mother; Stroke in her mother  She  reports that she has never smoked  She has never used smokeless tobacco  She reports that she does not drink alcohol or use drugs    Current Outpatient Prescriptions   Medication Sig Dispense Refill    conjugated estrogens (PREMARIN) vaginal cream Insert 1 g into the vagina 2 (two) times a week      levonorgestrel (MIRENA) 20 MCG/24HR IUD 1 each by Intrauterine route once       No current facility-administered medications for this visit  Current Outpatient Prescriptions on File Prior to Visit   Medication Sig    conjugated estrogens (PREMARIN) vaginal cream Insert 1 g into the vagina 2 (two) times a week     No current facility-administered medications on file prior to visit  She has No Known Allergies       Review of Systems      Objective:      /78 (BP Location: Right arm, Patient Position: Sitting, Cuff Size: Standard)   Wt 110 kg (242 lb)   LMP 05/22/2018 (Exact Date)   BMI 41 54 kg/m²          Physical Exam      Iud removal  Date/Time: 6/5/2018 12:09 PM  Performed by: Tena Morgan  Authorized by: Tena Morgan     Consent:     Consent obtained:  Verbal    Consent given by:  Patient    Procedure risks and benefits discussed: yes      Patient questions answered: yes      Patient agrees, verbalizes understanding, and wants to proceed: yes    Procedure:     Removed with no complications: yes      Removal due to mechanical complications of IUD: yes    Iud insertions  Date/Time: 6/5/2018 12:09 PM  Performed by: Tena Morgan  Authorized by: Tena Morgan     Consent:     Consent obtained:  Verbal    Consent given by:  Patient    Procedure risks and benefits discussed: yes      Patient questions answered: yes      Patient agrees, verbalizes understanding, and wants to proceed: yes      Educational handouts given: yes    Procedure:     Pelvic exam performed: yes      Cervix cleaned and prepped: yes      Speculum placed in vagina: yes      Tenaculum applied to cervix: yes      Uterus sounded: yes      IUD inserted with no complications: yes      IUD type:  Mirena    Strings trimmed: yes      Uterus sound depth (cm):  10  Post-procedure:     Patient tolerated procedure well: yes      Patient will follow up after next period: yes        LOT NUMBEr: [id number]

## 2018-06-05 NOTE — Clinical Note
Her IUD migrated into her cervix so I removed it and used the EXTRA mirena that was here to replace it

## 2018-06-05 NOTE — ASSESSMENT & PLAN NOTE
Mirena placed 2 months ago- had two heavy menses and has daily spotting  On exam IUD palpated in the cervix  Removed and new Mirena reinserted

## 2018-06-05 NOTE — PATIENT INSTRUCTIONS
Intrauterine Device   WHAT YOU NEED TO KNOW:   What is an intrauterine device? An intrauterine device (IUD) is a type of birth control that is inserted into your uterus  It is a small, flexible piece of plastic with a string on the end  It is inserted and removed by your healthcare provider  IUDs prevent sperm from reaching or fertilizing an egg  IUDs also prevent a fertilized egg from attaching to the uterus and developing into a fetus  What are the most common types of IUDs? · Copper: This type of IUD slowly releases a small amount of copper into your uterus  This IUD can remain in place for up to 10 years  · Hormone-releasing: This type of IUD slowly releases a small amount of progesterone into your uterus  Progesterone is a hormone that is made by your body to help control your periods  This IUD can remain in place for up to 5 years  What are the advantages of an IUD? · Effective: An IUD is 98% to 99% effective in preventing pregnancy  · Easily removable: The IUD can be removed if you decide to have a baby  You may be able to get pregnant as soon as the IUD is removed  · Immediate:  An IUD protects you from pregnancy right after it is inserted  · Convenient:  You do not have to stop sexual activity to insert it or worry about remembering to take your birth control pill  · Safe:  Copper IUDs are safer for some women than oral birth control pills  Examples include women who smoke or have a history of blood clots  · Health effects:  Hormone-releasing IUDs may decrease certain health problems  Examples include bleeding and cramping that happen with your monthly period  What are the risks of an IUD? · An IUD does not protect you from sexually transmitted infections  You may have cramps during the first weeks after you get the IUD  A copper IUD may cause your monthly period to be heavier or more painful  This is more common within the first 3 months after you get the IUD   You may need to have your IUD removed if your bleeding or pain becomes severe  You may have spotting between periods  · There is a small chance that you could get pregnant  Sometimes the IUD cannot be removed after you get pregnant  This increases your risk of a miscarriage or an ectopic pregnancy  Ectopic pregnancy is when the fertilized egg starts to grow somewhere other than your uterus  There is a small risk of an infection within the first 20 days after the IUD is placed  Infection can lead to pelvic inflammatory disease  This can cause infertility  Your uterus may tear when the IUD is inserted  The IUD may slip part or all of the way out of your uterus  How is the IUD inserted? · The IUD is usually inserted during your monthly period  This may help decrease the amount of discomfort you have during the procedure  It also helps ensure that you are not pregnant  You will be asked to lie down and place your feet in stirrups  Your healthcare provider will gently insert a speculum into your vagina  This is the same tool used during a pap smear  The speculum allows your healthcare provider to see inside your vagina to your cervix  The cervix is the opening of your uterus  · Your healthcare provider will clean your cervix with an antiseptic solution to prevent infection  You may be given numbing medicine  A long plastic tube is gently passed through your cervix and into your uterus  This tube has the IUD inside of it  The IUD is pushed out of the tube and into your uterus  You may have cramps as the IUD is inserted  The tube is removed after the IUD is in place  How can I make sure my IUD is still in place? An IUD has a string made of plastic thread  One to 2 inches of this string hangs into your vagina  You cannot see this string, and it will not cause problems when you have sex  Check your IUD string every 3 days for the first 3 months after it is inserted  After that, check the string after each monthly period   Do the following to check the placement of your IUD:  · Wash your hands with soap and warm water  Dry them with a clean towel  · Bend your knees and squat low to the ground  · Gently put your index finger inside your vagina  The cervix is at the top of the vagina and feels like the tip of your nose  Feel for the IUD string  Do not pull on the string  You should not be able to feel the firm plastic of the IUD itself  · Wash your hands after you check your IUD string  Where can I find more information? · [facility] [address] , [address] [place]  Phone: [phone]  Web Address: https://[employer]/  org  When should I contact my healthcare provider? · You think you are pregnant  · You bleed after you have sex  · You have pain during sex  · You cannot feel the IUD string, the string feels longer, or you feel the plastic of the IUD itself  · You have vaginal discharge that is green, yellow, or has a foul odor  · You have questions or concerns about your condition or care  When should I seek immediate care? · You have severe pain or bleeding during your period  · You have a fever and severe abdominal pain  CARE AGREEMENT:   You have the right to help plan your care  Learn about your health condition and how it may be treated  Discuss treatment options with your caregivers to decide what care you want to receive  You always have the right to refuse treatment  The above information is an  only  It is not intended as medical advice for individual conditions or treatments  Talk to your doctor, nurse or pharmacist before following any medical regimen to see if it is safe and effective for you  © 2017 [address] Information is for End User's use only and may not be sold, redistributed or otherwise used for commercial purposes   All illustrations and images included in CareNotes® are the copyrighted property of A D A M , Inc  or Carlos Guillermo

## 2018-07-12 ENCOUNTER — TELEPHONE (OUTPATIENT)
Dept: OBGYN CLINIC | Facility: CLINIC | Age: 33
End: 2018-07-12

## 2018-07-12 NOTE — TELEPHONE ENCOUNTER
[facility],    Hi    This pt called to robby her 6wk f/u of her mirena insertion that was to have been 7/18 with KTM - where do we go with her on KTM's robby???    Thanks

## 2018-07-13 NOTE — TELEPHONE ENCOUNTER
This patient was called on 6/8/18 to reschedule her appointment  Not sure why she waited so long to call back  I will try and find her something if not with OLIVA toribio

## 2018-07-31 ENCOUNTER — OFFICE VISIT (OUTPATIENT)
Dept: OBGYN CLINIC | Age: 33
End: 2018-07-31
Payer: COMMERCIAL

## 2018-07-31 VITALS
BODY MASS INDEX: 40.82 KG/M2 | WEIGHT: 245 LBS | SYSTOLIC BLOOD PRESSURE: 128 MMHG | HEIGHT: 65 IN | DIASTOLIC BLOOD PRESSURE: 78 MMHG

## 2018-07-31 DIAGNOSIS — N94.10 DYSPAREUNIA IN FEMALE: ICD-10-CM

## 2018-07-31 DIAGNOSIS — N92.1 BREAKTHROUGH BLEEDING WITH IUD: ICD-10-CM

## 2018-07-31 DIAGNOSIS — Z97.5 BREAKTHROUGH BLEEDING WITH IUD: ICD-10-CM

## 2018-07-31 DIAGNOSIS — D25.9 UTERINE LEIOMYOMA, UNSPECIFIED LOCATION: ICD-10-CM

## 2018-07-31 DIAGNOSIS — N92.0 MENORRHAGIA WITH REGULAR CYCLE: Primary | ICD-10-CM

## 2018-07-31 DIAGNOSIS — T83.32XA MALPOSITIONED INTRAUTERINE DEVICE (IUD), INITIAL ENCOUNTER: ICD-10-CM

## 2018-07-31 PROCEDURE — 99213 OFFICE O/P EST LOW 20 MIN: CPT | Performed by: NURSE PRACTITIONER

## 2018-07-31 RX ORDER — TRANEXAMIC ACID 650 1/1
2 TABLET ORAL 3 TIMES DAILY
Qty: 30 TABLET | Refills: 1 | Status: SHIPPED | OUTPATIENT
Start: 2018-07-31 | End: 2018-08-05

## 2018-07-31 NOTE — PROGRESS NOTES
Assessment/Plan:    No problem-specific Assessment & Plan notes found for this encounter  Diagnoses and all orders for this visit:    Menorrhagia with regular cycle  -     Tranexamic Acid 650 MG TABS; Take 2 tablets by mouth 3 (three) times a day for 5 days Take only on the heavy menses days    Malpositioned intrauterine device (IUD), initial encounter  -     US pelvis complete w transvaginal; Future    Uterine leiomyoma, unspecified location  -     US pelvis complete w transvaginal; Future    Dyspareunia in female  -     conjugated estrogens (PREMARIN) vaginal cream; Insert 1 g into the vagina 2 (two) times a week    Breakthrough bleeding with IUD      MAY NEED TO HAVE MIRENA REMOVED, WILL CHECK PELVIC U/S    Subjective:      Patient ID: Suzanne West is a 28 y o  female  HPI    The following portions of the patient's history were reviewed and updated as appropriate:   She  has a past medical history of Asthma; Diabetes mellitus ([address] ); Hypertension; Migraine; and Varicella  She   Patient Active Problem List    Diagnosis Date Noted    Menorrhagia with regular cycle 2018    Malpositioned intrauterine device 2018    Uterine leiomyoma 2018    Breakthrough bleeding with IUD 2018    Dyspareunia in female 2018     She  has a past surgical history that includes Induced   Her family history includes Asthma in her brother, brother, brother, sister, sister, and sister; Cancer in her mother; Diabetes in her father and paternal grandmother; Heart disease in her mother; Stroke in her mother  She  reports that she has never smoked  She has never used smokeless tobacco  She reports that she does not drink alcohol or use drugs    Current Outpatient Prescriptions   Medication Sig Dispense Refill    levonorgestrel (MIRENA) 20 MCG/24HR IUD 1 each by Intrauterine route once      [START ON 2018] conjugated estrogens (PREMARIN) vaginal cream Insert 1 g into the vagina 2 (two) times a week 42 5 g 0    Tranexamic Acid 650 MG TABS Take 2 tablets by mouth 3 (three) times a day for 5 days Take only on the heavy menses days 30 tablet 1     No current facility-administered medications for this visit  She has No Known Allergies  OB History    Para Term  AB Living   4 2 2   2 2   SAB TAB Ectopic Multiple Live Births         0 2      # Outcome Date GA Lbr Jay Jay/2nd Weight Sex Delivery Anes PTL Lv   4 Term 10/21/16 38w6d 07:10 / 00:08 3500 g (7 lb 11 5 oz) F Vag-Spont EPI N JENNY   3 Term 01/13/15 38w4d  3459 g (7 lb 10 oz) M Vag-Spont EPI  JENNY   2 AB            1 AB                   Review of Systems   Constitutional: Negative for activity change, chills, fatigue, fever and unexpected weight change  HENT: Negative for mouth sores and trouble swallowing  Respiratory: Negative for shortness of breath  Gastrointestinal: Negative for anal bleeding, blood in stool, constipation and diarrhea  Genitourinary: Negative for difficulty urinating, dysuria, genital sores and hematuria  Neurological: Negative for weakness  Psychiatric/Behavioral: Negative for confusion and self-injury  Objective:      /78 (BP Location: Right arm, Patient Position: Sitting)   Ht 5' 5" (1 651 m)   Wt 111 kg (245 lb)   LMP 2018   Breastfeeding? No   BMI 40 77 kg/m²          Physical Exam   Constitutional: She appears well-developed and well-nourished  She is cooperative  No distress  Pulmonary/Chest: Effort normal    Abdominal: Soft  Hernia confirmed negative in the right inguinal area and confirmed negative in the left inguinal area  Genitourinary: No labial fusion  There is no rash, tenderness, lesion or injury on the right labia  There is no rash, tenderness, lesion or injury on the left labia  Uterus is not deviated, not enlarged, not fixed and not tender  Right adnexum displays no mass, no tenderness and no fullness   Left adnexum displays no mass, no tenderness and no fullness  No erythema, tenderness or bleeding in the vagina  No foreign body in the vagina  No signs of injury around the vagina  Genitourinary Comments: IUD strings visualized BUT POSSIBLY PALPATING LOWER PLASTIC EDGE OF THE MIRENA, WILL GET U/S TO CONFIRM, HX OF FIBROID    Lymphadenopathy:        Right: No inguinal adenopathy present  Left: No inguinal adenopathy present  Skin: She is not diaphoretic

## 2018-07-31 NOTE — PATIENT INSTRUCTIONS
Menorrhagia   AMBULATORY CARE:   Menorrhagia  is heavy menstrual bleeding for more than 7 days or severe menstrual bleeding for less than 7 days  Your menstrual bleeding and cramping are so heavy that you have trouble doing your usual daily activities  Your monthly period may also occur more often, and you may bleed between periods  Menorrhagia is common in adolescence and around menopause  Common symptoms include the following:   · Soaking a pad or tampon every 1 to 2 hours    · Using both a pad and a tampon    · Waking up at night to change your pad or tampon    · Blood clots with your bleeding for more than 1 day    · Abdominal pain or cramps  Call 911 for any of the following:   · You have chest pain and shortness of breath  · Your heart is fluttering or beating faster than usual for you  Seek care immediately if:   · You feel dizzy when you stand  · You feel confused  · You have severe abdominal pain, nausea, and vomiting  · Your skin or the whites of your eyes turn yellow  Contact your healthcare provider if:   · You need to change your pad or tampon more than 1 time per hour, for several hours in a row  · You feel more weak and tired than usual      · You have new coldness in your hands and feet  · You have questions or concerns about your condition or care  Medicines: You may need any of the following:  · Iron supplements  may be given if your blood iron level decreases because of heavy bleeding  · NSAIDs , such as ibuprofen, treat menstrual cramps  This medicine is available with or without a doctor's order  NSAIDs can cause stomach bleeding or kidney problems in certain people  If you take blood thinner medicine, always ask your healthcare provider if NSAIDs are safe for you  Always read the medicine label and follow directions  · Hormones  help slow or stop your bleeding and make your monthly periods more regular   This medicine may be given as birth control pills or an intrauterine device (IUD)  · Take your medicine as directed  Contact your healthcare provider if you think your medicine is not helping or if you have side effects  Tell him of her if you are allergic to any medicine  Keep a list of the medicines, vitamins, and herbs you take  Include the amounts, and when and why you take them  Bring the list or the pill bottles to follow-up visits  Carry your medicine list with you in case of an emergency  Manage your symptoms:   · Keep a supply of pads or tampons with you at all times  If possible, stay close to a bathroom  · Apply heat  on your abdomen for 20 to 30 minutes every 2 hours for as many days as directed  Heat helps decrease pain and cramps  Follow up with your healthcare provider as directed: You may need regular pelvic exams with Pap smears to monitor your condition  Write down your questions so you remember to ask them during your visits  © 2017 [address] Information is for End User's use only and may not be sold, redistributed or otherwise used for commercial purposes  All illustrations and images included in CareNotes® are the copyrighted property of [employer]  or Carlos Guillermo  The above information is an  only  It is not intended as medical advice for individual conditions or treatments  Talk to your doctor, nurse or pharmacist before following any medical regimen to see if it is safe and effective for you

## 2018-08-02 ENCOUNTER — HOSPITAL ENCOUNTER (OUTPATIENT)
Dept: ULTRASOUND IMAGING | Facility: HOSPITAL | Age: 33
Discharge: HOME | End: 2018-08-02
Payer: COMMERCIAL

## 2018-08-02 DIAGNOSIS — T83.32XA MALPOSITIONED INTRAUTERINE DEVICE (IUD), INITIAL ENCOUNTER: ICD-10-CM

## 2018-08-02 DIAGNOSIS — D25.9 UTERINE LEIOMYOMA, UNSPECIFIED LOCATION: ICD-10-CM

## 2018-08-02 PROCEDURE — 76830 TRANSVAGINAL US NON-OB: CPT

## 2018-08-02 PROCEDURE — 76856 US EXAM PELVIC COMPLETE: CPT

## 2018-08-03 ENCOUNTER — TELEPHONE (OUTPATIENT)
Dept: OBGYN CLINIC | Facility: CLINIC | Age: 33
End: 2018-08-03

## 2018-08-03 NOTE — TELEPHONE ENCOUNTER
----- Message from Vishal Galicia, [address] sent at 8/3/2018 11:03 AM EDT -----  Pls advise pt the IUD is in the cervix so she should schedule to come in for removal  Thx

## 2018-08-03 NOTE — TELEPHONE ENCOUNTER
----- Message from [employer] sent at 8/3/2018 12:08 PM EDT -----  Regarding: Test Results Question  Contact: [phone]  Hello so i got my results from the ultrasound and the mirena is in my cervix so I need a removal  does this mean this is for good being that this is the 2nd removal?

## 2018-08-03 NOTE — TELEPHONE ENCOUNTER
Patient is aware of results - she is scheduled for removal for 08/24 with Matilda   She is also on the wait list

## 2018-08-06 NOTE — TELEPHONE ENCOUNTER
Patient returned call - she will inquire about other options when she comes in for the mirena removal

## 2018-08-24 ENCOUNTER — OFFICE VISIT (OUTPATIENT)
Dept: OBGYN CLINIC | Age: 33
End: 2018-08-24
Payer: COMMERCIAL

## 2018-08-24 VITALS
DIASTOLIC BLOOD PRESSURE: 82 MMHG | WEIGHT: 242 LBS | BODY MASS INDEX: 40.32 KG/M2 | HEIGHT: 65 IN | SYSTOLIC BLOOD PRESSURE: 124 MMHG

## 2018-08-24 DIAGNOSIS — Z30.432 ENCOUNTER FOR IUD REMOVAL: Primary | ICD-10-CM

## 2018-08-24 PROBLEM — D25.9 UTERINE LEIOMYOMA: Status: RESOLVED | Noted: 2018-07-31 | Resolved: 2018-08-24

## 2018-08-24 PROBLEM — Z97.5 BREAKTHROUGH BLEEDING WITH IUD: Status: RESOLVED | Noted: 2018-06-05 | Resolved: 2018-08-24

## 2018-08-24 PROBLEM — N92.1 BREAKTHROUGH BLEEDING WITH IUD: Status: RESOLVED | Noted: 2018-06-05 | Resolved: 2018-08-24

## 2018-08-24 PROBLEM — T83.32XA MALPOSITIONED INTRAUTERINE DEVICE: Status: RESOLVED | Noted: 2018-07-31 | Resolved: 2018-08-24

## 2018-08-24 PROCEDURE — 58301 REMOVE INTRAUTERINE DEVICE: CPT | Performed by: NURSE PRACTITIONER

## 2018-08-24 NOTE — PROGRESS NOTES
Iud removal  Date/Time: 8/24/2018 3:34 PM  Performed by: Abner Chin by: Sheryle Baars     Consent:     Consent obtained:  Verbal    Consent given by:  Patient    Procedure risks and benefits discussed: yes      Patient questions answered: yes      Patient agrees, verbalizes understanding, and wants to proceed: yes      Educational handouts given: no      Instructions and paperwork completed: yes    Procedure:     Removed with no complications: yes      Removal due to mechanical complications of IUD: no      Removal due to infection and inflammatory reaction: no      Other reason for removal:  Low lying IUD  Comments:      Patient has decided on BTL, will return for consult with MD

## 2018-08-24 NOTE — PATIENT INSTRUCTIONS
Tubal Ligation   WHAT YOU NEED TO KNOW:   What do I need to know about a tubal ligation? A tubal ligation is surgery to close your fallopian tubes to prevent pregnancy  How do I prepare for a tubal ligation? Your healthcare provider will talk to you about how to prepare for surgery  He may tell you not to eat or drink anything within 8 hours before your surgery  He will tell you what medicines to take or not take on the day of your surgery  Arrange for someone to drive you home and stay with you after surgery  What will happen during a tubal ligation? · You may be given general anesthesia to keep you asleep and free from pain during surgery  You may instead be given regional anesthesia or local anesthesia  Regional anesthesia numbs the lower part of your body  Local anesthesia numbs the surgery area  With local anesthesia, you may still feel pressure or pushing during surgery, but you should not feel any pain  You may have a minilaparotomy or laparoscopic tubal ligation  During a minilaparotomy, your surgeon will make a small incision in your lower abdomen  · During laparoscopic tubal ligation, your surgeon will make one or more small incisions in your abdomen  A laparoscope and other instruments will be put into your abdomen through the small incisions  The laparoscope is a long metal tube with a light and camera on the end  Your abdomen will be filled with a gas  This allows your surgeon to see inside your abdomen more clearly  Your fallopian tubes will be cut and closed with thread  Your healthcare provider may instead seal your tubes with heat, or with a clip or ring  After the surgery is done, your incisions are closed with stitches or staples  The incisions may be covered with a bandage  What will happen after a tubal ligation? You will have abdominal pain and cramps for the first few days after surgery  You may feel dizzy, nauseated, bloated, or have gas   You may also have pain in your shoulders or near your ribs if gas was put in your abdomen  What are the risks of a tubal ligation? You may bleed more than expected or get an infection  Blood vessels or organs such as your bowel or bladder could be injured during surgery  Although pregnancy is unlikely after a tubal ligation, there is still a small chance that you may get pregnant  If pregnancy does occur, there is an increased risk for an ectopic pregnancy (tubal pregnancy)  You may get a blood clot in your leg or arm  This may become life-threatening  CARE AGREEMENT:   You have the right to help plan your care  Learn about your health condition and how it may be treated  Discuss treatment options with your caregivers to decide what care you want to receive  You always have the right to refuse treatment  The above information is an  only  It is not intended as medical advice for individual conditions or treatments  Talk to your doctor, nurse or pharmacist before following any medical regimen to see if it is safe and effective for you  © 2017 [address] Information is for End User's use only and may not be sold, redistributed or otherwise used for commercial purposes  All illustrations and images included in CareNotes® are the copyrighted property of A D A M , Inc  or Carlos Guillermo

## 2018-09-05 ENCOUNTER — TELEPHONE (OUTPATIENT)
Dept: OBGYN CLINIC | Age: 33
End: 2018-09-05

## 2018-09-05 NOTE — TELEPHONE ENCOUNTER
Spoke with pt - she had her period 2 weeks ago - was light  Had Mirena removed last week and today she is having moderate bleeding with cramping and small clots  Advised it is normal to have this - body is adjusting to not having the hormones in her system  If it happens again next month - to call back

## 2018-09-05 NOTE — TELEPHONE ENCOUNTER
Pt had the mirena removed last week and started her cycle today  Pt is experiencing some cramping and clotting  Pt would like to know if this is normal?  Pt had her cycle 2 weeks ago  Please advise

## 2019-01-10 ENCOUNTER — HOSPITAL ENCOUNTER (EMERGENCY)
Facility: HOSPITAL | Age: 34
Discharge: HOME | End: 2019-01-10
Attending: EMERGENCY MEDICINE | Admitting: EMERGENCY MEDICINE
Payer: COMMERCIAL

## 2019-01-10 VITALS
HEART RATE: 105 BPM | TEMPERATURE: 98.9 F | HEIGHT: 64 IN | WEIGHT: 257.38 LBS | OXYGEN SATURATION: 100 % | BODY MASS INDEX: 43.94 KG/M2 | DIASTOLIC BLOOD PRESSURE: 59 MMHG | SYSTOLIC BLOOD PRESSURE: 131 MMHG | RESPIRATION RATE: 18 BRPM

## 2019-01-10 DIAGNOSIS — R10.9 ABDOMINAL PAIN IN PREGNANCY: Primary | ICD-10-CM

## 2019-01-10 DIAGNOSIS — O26.899 ABDOMINAL PAIN IN PREGNANCY: Primary | ICD-10-CM

## 2019-01-10 LAB
BILIRUB UR QL STRIP: NEGATIVE
CLARITY UR: NORMAL
COLOR UR: YELLOW
GLUCOSE UR STRIP-MCNC: NEGATIVE MG/DL
HGB UR QL STRIP.AUTO: NEGATIVE
KETONES UR STRIP-MCNC: NEGATIVE MG/DL
LEUKOCYTE ESTERASE UR QL STRIP: NEGATIVE
NITRITE UR QL STRIP: NEGATIVE
PH UR STRIP.AUTO: 7 [PH] (ref 4.5–8)
PROT UR STRIP-MCNC: NEGATIVE MG/DL
SP GR UR STRIP.AUTO: 1.02 (ref 1–1.03)
UROBILINOGEN UR QL STRIP.AUTO: 1 E.U./DL

## 2019-01-10 PROCEDURE — 81003 URINALYSIS AUTO W/O SCOPE: CPT | Performed by: PHYSICIAN ASSISTANT

## 2019-01-10 PROCEDURE — 99284 EMERGENCY DEPT VISIT MOD MDM: CPT

## 2019-01-10 PROCEDURE — 87086 URINE CULTURE/COLONY COUNT: CPT | Performed by: PHYSICIAN ASSISTANT

## 2019-01-10 RX ORDER — ACETAMINOPHEN 325 MG/1
650 TABLET ORAL ONCE
Status: COMPLETED | OUTPATIENT
Start: 2019-01-10 | End: 2019-01-10

## 2019-01-10 RX ADMIN — ACETAMINOPHEN 650 MG: 325 TABLET, FILM COATED ORAL at 18:05

## 2019-01-10 NOTE — DISCHARGE INSTRUCTIONS
Return to the Emergency Department sooner if increased pain, fever, vomiting, diarrhea, difficulty breathing or urinating, bleeding  Begin taking prenatal vitamins    Abdominal Pain in Pregnancy   WHAT YOU NEED TO KNOW:   Abdominal pain during pregnancy is common  Some of the causes include heartburn, constipation, gas, false labor, and round ligament pain  Round ligament pain is caused by stretching of the ligaments that support your uterus  Abdominal pain may be caused by a health problem, such as a stomach virus or appendicitis (inflammation of the appendix)  The pain may also be caused by a problem with your pregnancy, such as a threatened miscarriage or  labor  DISCHARGE INSTRUCTIONS:   Follow up with your obstetrician within 3 days:  Write down your questions so you remember to ask them during your visits  Self-care:   · Rest may help to relieve round ligament pain  Ask your healthcare provider about other ways to relieve this pain, such as a supportive belt or pregnancy exercises  · Use a heating pad set to the lowest setting or a hot compress to apply heat to your abdomen  Do this for 20 to 30 minutes every 2 hours for as many days as directed  · Avoid quick changes in position or movements that cause pain  · Do not lie flat in bed or bend over if you have heartburn  Ask your obstetrician if you should make any changes to the foods you eat  Ask if you can take any medicines for heartburn  · Eat more fiber and drink more liquids to relieve constipation  Fiber is found in fruits, vegetables, and whole-grain foods, such as whole-wheat bread and cereals  Ask how much liquid to drink each day and which liquids are best for you  Contact your obstetrician if:  · You continue to have abdominal pain that cannot be relieved  · You have a fever  · You have questions or concerns about your condition or care    Return to the emergency department if:   · You have sudden, severe pain or cramps that are so bad that you cannot walk or talk  · You have a fast heartbeat, shortness of breath, and you feel lightheaded or faint  · You have vaginal bleeding or discharge  · You have nausea, vomiting, fever, and severe pain on your right side  © 2017 [address] Information is for End User's use only and may not be sold, redistributed or otherwise used for commercial purposes  All illustrations and images included in CareNotes® are the copyrighted property of [employer]  or Carlos Guillermo  The above information is an  only  It is not intended as medical advice for individual conditions or treatments  Talk to your doctor, nurse or pharmacist before following any medical regimen to see if it is safe and effective for you

## 2019-01-10 NOTE — ED PROVIDER NOTES
History  Chief Complaint   Patient presents with    Abdominal Pain     Patient c/o generalized abdominal pain for the last 2 weeks  Patient states she recently took a pregnancy test and it come up positive  Patient states she is unsure if the pain is related to pregnancy  Patient denies vomiting, c/o nausea  This is a 27-year-old female patient presents to the ER for evaluation of abdominal pain  She notes recent positive pregnancy test at home  Her pain is all lower abdomen  She has had this pain on off for about 3 weeks now  Comes and goes  At times completely subsides  Feels like a aching pain  She has had this in her prior pregnancies  She has 2 prior pregnancies both cared full-term  Gestational diabetes was her only complication, no other medical problems during pregnancy  Denies any vaginal bleeding or vaginal discharge  No visual disturbances  No lightheadedness or dizziness  No fevers chills or vomiting  Intermittent nausea in the morning          History provided by:  Patient   used: No    Abdominal Pain   Pain location:  Suprapubic  Pain quality: aching    Pain radiates to:  Does not radiate  Pain severity:  Mild  Onset quality:  Gradual  Duration:  3 weeks  Timing:  Intermittent  Progression:  Waxing and waning  Chronicity:  New  Context: not alcohol use, not awakening from sleep, not diet changes, not eating, not laxative use, not medication withdrawal, not previous surgeries, not recent illness, not recent sexual activity, not recent travel, not retching, not sick contacts, not suspicious food intake and not trauma    Relieved by:  Nothing  Worsened by:  Nothing  Ineffective treatments:  None tried  Associated symptoms: nausea    Associated symptoms: no anorexia, no belching, no chest pain, no chills, no constipation, no cough, no diarrhea, no dysuria, no fatigue, no fever, no flatus, no hematemesis, no hematochezia, no hematuria, no melena, no shortness of breath, no sore throat, no vaginal bleeding, no vaginal discharge and no vomiting    Risk factors: pregnancy    Risk factors: no alcohol abuse, no aspirin use, not elderly, has not had multiple surgeries, no NSAID use, not obese and no recent hospitalization        Prior to Admission Medications   Prescriptions Last Dose Informant Patient Reported? Taking?   conjugated estrogens (PREMARIN) vaginal cream   No No   Sig: Insert 1 g into the vagina 2 (two) times a week      Facility-Administered Medications: None       Past Medical History:   Diagnosis Date    Asthma     childhood    Diabetes mellitus ([address] )     gestational    Hypertension     Migraine     Varicella     childhood       Past Surgical History:   Procedure Laterality Date    INDUCED          Family History   Problem Relation Age of Onset    Cancer Mother     Heart disease Mother     Stroke Mother     Diabetes Father     Asthma Sister     Asthma Brother     Diabetes Paternal Grandmother     Asthma Brother     Asthma Brother     Asthma Sister     Asthma Sister      I have reviewed and agree with the history as documented  Social History   Substance Use Topics    Smoking status: Never Smoker    Smokeless tobacco: Never Used    Alcohol use No        Review of Systems   Constitutional: Negative for activity change, appetite change, chills, diaphoresis, fatigue, fever and unexpected weight change  HENT: Negative for congestion, rhinorrhea, sinus pressure, sore throat and trouble swallowing  Eyes: Negative for photophobia and visual disturbance  Respiratory: Negative for apnea, cough, choking, chest tightness, shortness of breath, wheezing and stridor  Cardiovascular: Negative for chest pain, palpitations and leg swelling  Gastrointestinal: Positive for abdominal pain and nausea  Negative for abdominal distention, anorexia, blood in stool, constipation, diarrhea, flatus, hematemesis, hematochezia, melena and vomiting  Genitourinary: Negative for decreased urine volume, difficulty urinating, dysuria, enuresis, flank pain, frequency, hematuria, urgency, vaginal bleeding and vaginal discharge  Musculoskeletal: Negative for arthralgias, myalgias, neck pain and neck stiffness  Skin: Negative for color change, pallor, rash and wound  Allergic/Immunologic: Negative  Neurological: Negative for dizziness, tremors, syncope, weakness, light-headedness, numbness and headaches  Hematological: Negative  Psychiatric/Behavioral: Negative  All other systems reviewed and are negative  Physical Exam  Physical Exam   Constitutional: She is oriented to person, place, and time  She appears well-developed and well-nourished  Non-toxic appearance  She does not have a sickly appearance  She does not appear ill  No distress  HENT:   Head: Normocephalic and atraumatic  Eyes: Pupils are equal, round, and reactive to light  EOM and lids are normal    Neck: Normal range of motion  Neck supple  Cardiovascular: Normal rate, regular rhythm, S1 normal, S2 normal, normal heart sounds, intact distal pulses and normal pulses  Exam reveals no gallop, no distant heart sounds, no friction rub and no decreased pulses  No murmur heard  Pulses:       Radial pulses are 2+ on the right side, and 2+ on the left side  Pulmonary/Chest: Effort normal and breath sounds normal  No accessory muscle usage  No apnea, no tachypnea and no bradypnea  No respiratory distress  She has no decreased breath sounds  She has no wheezes  She has no rhonchi  She has no rales  Abdominal: Soft  Normal appearance  She exhibits no distension  There is no tenderness  There is no rigidity, no rebound and no guarding  No abdominal tenderness   Musculoskeletal: Normal range of motion  She exhibits no edema, tenderness or deformity  Neurological: She is alert and oriented to person, place, and time  No cranial nerve deficit  GCS eye subscore is 4   GCS verbal subscore is 5  GCS motor subscore is 6  GCS 15  AAOx3  Ambulating in department without difficulty  CN II-XII grossly intact  No focal neuro deficits  Skin: Skin is warm, dry and intact  No rash noted  She is not diaphoretic  No erythema  No pallor  Psychiatric: Her speech is normal    Nursing note and vitals reviewed  Vital Signs  ED Triage Vitals   Temperature Pulse Respirations Blood Pressure SpO2   01/10/19 1605 01/10/19 1605 01/10/19 1605 01/10/19 1607 01/10/19 1605   98 9 °F (37 2 °C) (!) 117 20 142/77 100 %      Temp Source Heart Rate Source Patient Position - Orthostatic VS BP Location FiO2 (%)   01/10/19 1605 01/10/19 1605 01/10/19 1607 01/10/19 1607 --   Oral Monitor Sitting Left arm       Pain Score       01/10/19 1605       4           Vitals:    01/10/19 1605 01/10/19 1607 01/10/19 1729   BP:  142/77 131/59   Pulse: (!) 117  105   Patient Position - Orthostatic VS:  Sitting Lying       Visual Acuity      ED Medications  Medications   acetaminophen (TYLENOL) tablet 650 mg (650 mg Oral Given 1/10/19 1805)       Diagnostic Studies  Results Reviewed     Procedure Component Value Units Date/Time    UA w Reflex to Microscopic w Reflex to Culture [[id number]] Collected:  01/10/19 1806    Lab Status:  Final result Specimen:  Urine from Urine, Clean Catch Updated:  01/10/19 1812     Color, UA Yellow     Clarity, UA Slightly Cloudy     Specific Moody, UA 1 020     pH, UA 7 0     Leukocytes, UA Negative     Nitrite, UA Negative     Protein, UA Negative mg/dl      Glucose, UA Negative mg/dl      Ketones, UA Negative mg/dl      Urobilinogen, UA 1 0 E U /dl      Bilirubin, UA Negative     Blood, UA Negative     URINE COMMENT --    Urine culture [[id number]] Collected:  01/10/19 1806    Lab Status:   In process Specimen:  Urine from Urine, Clean Catch Updated:  01/10/19 1812                 No orders to display              Procedures  Procedures       Phone Contacts  ED Phone Contact    ED Course MDM  Number of Diagnoses or Management Options  Abdominal pain in pregnancy: new and requires workup  Diagnosis management comments: DDx including but not limited to: threatened , ectopic pregnancy, ovarian torsion, ovarian cyst, ruptured ovarian cyst, mesenteric adenitis, gastritis, PUD, GERD, gastroparesis, pancreatitis, hepatitis, IBD, IBS, ileus, colitis, enteritis, renal colic, pyelonephritis, UTI, biliary colic, choledocholithiasis, perforated viscus, splenic etiology, constipation; doubt bowel obstruction or appendicitis or cholecystitis or volvulus  Plan:  She is a very benign abdominal exam   Will check urinalysis for urine pregnancy  Bedside ultrasound confirms IUP in two views including axial and sagittal   Fetal heart rate of 158  Crown-rump length approximates age at about 14 weeks  Offered IV and labs, I have low suspicion for intra-abdominal etiology  She feels comfortable knowing that pregnancy is confirmed on ultrasound  She does not want IV or labs at this time  She states she will follow up with her obstetric physician  Amount and/or Complexity of Data Reviewed  Clinical lab tests: ordered and reviewed    Risk of Complications, Morbidity, and/or Mortality  Presenting problems: moderate  Management options: low  General comments: 40-year-old female with abdominal pain and pregnancy  Urinalysis unremarkable  Bedside ultrasound confirms intrauterine pregnancy  Images available on her paper chart  She would like to go home  She would like to defer IV and labs at this time  She will follow up with her obstetrician  We discussed return parameters  We discussed taking prenatal vitamins  She understands and agrees this plan  I have very low suspicion for appendicitis or other acute surgical process given her pain has been there for about 3 weeks  Her pain is likely due to her pregnancy itself      Patient Progress  Patient progress: stable    CritCare Time    Disposition  Final diagnoses:   Abdominal pain in pregnancy     Time reflects when diagnosis was documented in both MDM as applicable and the Disposition within this note     Time User Action Codes Description Comment    1/10/2019  6:39 PM  Shawn Add [O26 [id number],  R10 9] Abdominal pain in pregnancy       ED Disposition     ED Disposition Condition Comment    Discharge  Shruthi Shiver discharge to home/self care  Condition at discharge: Good        Follow-up Information     Follow up With Specialties Details Why Contact Info Additional Information    [facility] Call in 1 day to establish OBGYN [address] [place] [address] [facility] Gy[facility] [address], [address] [place] [address], [phone]          Patient's Medications   Discharge Prescriptions    No medications on file     No discharge procedures on file      ED Provider  Electronically Signed by           Saige Morfin PA-C  01/10/19 5985

## 2019-01-11 LAB — BACTERIA UR CULT: NORMAL

## 2019-03-27 ENCOUNTER — OFFICE VISIT (OUTPATIENT)
Dept: OBGYN CLINIC | Facility: CLINIC | Age: 34
End: 2019-03-27
Payer: COMMERCIAL

## 2019-03-27 VITALS
HEIGHT: 65 IN | SYSTOLIC BLOOD PRESSURE: 134 MMHG | DIASTOLIC BLOOD PRESSURE: 74 MMHG | BODY MASS INDEX: 42.49 KG/M2 | WEIGHT: 255 LBS

## 2019-03-27 DIAGNOSIS — Z30.011 OCP (ORAL CONTRACEPTIVE PILLS) INITIATION: ICD-10-CM

## 2019-03-27 DIAGNOSIS — Z30.09 BIRTH CONTROL COUNSELING: Primary | ICD-10-CM

## 2019-03-27 PROCEDURE — 99213 OFFICE O/P EST LOW 20 MIN: CPT | Performed by: NURSE PRACTITIONER

## 2019-03-27 RX ORDER — NORETHINDRONE ACETATE AND ETHINYL ESTRADIOL 1MG-20(21)
1 KIT ORAL DAILY
Qty: 28 TABLET | Refills: 2 | Status: SHIPPED | OUTPATIENT
Start: 2019-03-27 | End: 2019-04-10 | Stop reason: ALTCHOICE

## 2019-03-27 NOTE — PATIENT INSTRUCTIONS
Levonorgestrel (Into the uterus)   Levonorgestrel ([id number]-[id number]-msa-RHODA-trel)  Prevents pregnancy and treats heavy menstrual bleeding  This is an intrauterine device (IUD), which is a reversible form of birth control  This IUD slowly releases levonorgestrel, a hormone  Brand Name(s): Juan Antonio Zamudio, Mirena, [place]   There may be other brand names for this medicine  When This Medicine Should Not Be Used: This device is not right for everyone  Do not use it if you had an allergic reaction to levonorgestrel, or you are pregnant  How to Use This Medicine:   Device  · The IUD is usually inserted by your doctor during your monthly period  You will need to see your doctor 4 to 6 weeks after the IUD is placed and then once a year  · Your IUD has a string or "tail" that is made of plastic thread  About one or two inches of this string hangs into your vagina  You cannot see this string, and it will not cause problems when you have sex  Check your IUD after each monthly period  You may not be protected against pregnancy if you cannot feel the string or if you feel plastic  Do the following to check the placement of your IUD:  [facility] your hands with soap and warm water  Dry them with a clean towel  ¨ Bend your knees and squat low to the ground  ¨ Gently put your index finger high inside your vagina  The cervix is at the top of the vagina  Find the IUD string coming from your cervix  Never pull on the string  You should not be able to feel the plastic of the IUD itself  Wash your hands after you are done checking your IUD string  · Your doctor will need to replace your IUD after 3 years for [facility] or [place], or after 5 years for [facility] or [place] [id number]  You will also need to have it replaced if it comes out of your uterus  Drugs and Foods to Avoid:   Ask your doctor or pharmacist before using any other medicine, including over-the-counter medicines, vitamins, and herbal products    · Some medicines can affect how this device works  Tell your doctor if you are using a blood thinner (including warfarin)  Warnings While Using This Medicine:   · Tell your doctor if you are breastfeeding, or you have had a baby, miscarriage, or  in the past 3 months  Tell your doctor if you have liver disease (including tumor or cancer), breast cancer, heart or blood circulation problems, including a history of heart valve problems, heart disease, blood clotting problems, stroke, heart attack, or high blood pressure  Tell your doctor if you have problems with your immune system or have had surgery on your female organs (especially fallopian tubes)  · Tell your doctor if you have had any problems, infections, or other conditions that affected your reproductive system  There are many problems that could make an IUD a bad choice for you, including if you have fibroids, unexplained bleeding, a uterus that has an unusual shape, a recent infection, pelvic inflammatory disease, abnormal Pap test, ectopic pregnancy, cancer or suspected cancer, or an existing IUD  · There is a small chance that you could get pregnant when using an IUD, just as there is with any birth control  If you get pregnant, your doctor may remove your IUD to lower the risk of miscarriage or other problems  · This medicine may cause the following problems:  ¨ Increased risk of ectopic pregnancy (pregnancy outside the uterus)  ¨ Increased risk of a serious infection called pelvic inflammatory disease (PID)  ¨ Increased risk for ovarian cysts  ¨ Perforation (hole in the wall of your uterus), which can damage other organs  · You might have some spotting and cramping during the first weeks after the IUD has been inserted  These symptoms should decrease or go away within a few weeks up to 6 months  · You could have less bleeding or even stop having periods by the end of the first year   Call your doctor if you have a change from the regular bleeding pattern after you have had your IUD for awhile, such as more bleeding or if you miss a period (and you were having periods even with your IUD)  · An IUD can slip partly or all of the way out of your uterus  If this happens, use condoms or another form of birth control, and call your doctor right away  · This IUD will not protect you from HIV/AIDS, herpes, or other sexually transmitted diseases  · If you have the Erick Booth or Bren Bound, tell your healthcare provider before you have an MRI test   Possible Side Effects While Using This Medicine:   Call your doctor right away if you notice any of these side effects:  · Allergic reaction: Itching or hives, swelling in your face or hands, swelling or tingling in your mouth or throat, chest tightness, trouble breathing  · Chest pain, problems with speech or walking, numbness or weakness in your arm or leg or on one side of your body  · Heavy bleeding from your vagina  · Pain during sex, or if your partner feels the hard plastic of the IUD during sex  · Severe headache, vision changes  · Stomach or pelvic pain, tenderness, or cramping that is sudden or severe  · Vaginal discharge has a bad smell, fever, chills, sores on your genitals  · Yellow skin or eyes  If you notice these less serious side effects, talk with your doctor:   · Acne or other skin changes  · Breast pain  · Change in bleeding pattern after the first few months  · Dizziness or lightheadedness after IUD is placed  · Mild itching around your vagina and genitals  If you notice other side effects that you think are caused by this medicine, tell your doctor  Call your doctor for medical advice about side effects  You may report side effects to FDA at 7-981-FDA-9534  © 2017 [address] Information is for End User's use only and may not be sold, redistributed or otherwise used for commercial purposes  The above information is an  only  It is not intended as medical advice for individual conditions or treatments  Talk to your doctor, nurse or pharmacist before following any medical regimen to see if it is safe and effective for you

## 2019-03-29 ENCOUNTER — TELEPHONE (OUTPATIENT)
Dept: OBGYN CLINIC | Age: 34
End: 2019-03-29

## 2019-04-10 ENCOUNTER — OFFICE VISIT (OUTPATIENT)
Dept: OBGYN CLINIC | Facility: CLINIC | Age: 34
End: 2019-04-10
Payer: COMMERCIAL

## 2019-04-10 VITALS — DIASTOLIC BLOOD PRESSURE: 64 MMHG | WEIGHT: 253 LBS | SYSTOLIC BLOOD PRESSURE: 128 MMHG | BODY MASS INDEX: 42.1 KG/M2

## 2019-04-10 DIAGNOSIS — N92.0 MENORRHAGIA WITH REGULAR CYCLE: ICD-10-CM

## 2019-04-10 DIAGNOSIS — Z30.430 ENCOUNTER FOR IUD INSERTION: Primary | ICD-10-CM

## 2019-04-10 LAB — SL AMB POCT URINE HCG: NORMAL

## 2019-04-10 PROCEDURE — 87491 CHLMYD TRACH DNA AMP PROBE: CPT | Performed by: NURSE PRACTITIONER

## 2019-04-10 PROCEDURE — 87591 N.GONORRHOEAE DNA AMP PROB: CPT | Performed by: NURSE PRACTITIONER

## 2019-04-10 PROCEDURE — 58300 INSERT INTRAUTERINE DEVICE: CPT | Performed by: NURSE PRACTITIONER

## 2019-04-11 LAB
C TRACH DNA SPEC QL NAA+PROBE: NEGATIVE
N GONORRHOEA DNA SPEC QL NAA+PROBE: NEGATIVE

## 2019-05-10 ENCOUNTER — OFFICE VISIT (OUTPATIENT)
Dept: FAMILY MEDICINE CLINIC | Facility: CLINIC | Age: 34
End: 2019-05-10
Payer: COMMERCIAL

## 2019-05-10 VITALS
RESPIRATION RATE: 16 BRPM | DIASTOLIC BLOOD PRESSURE: 90 MMHG | SYSTOLIC BLOOD PRESSURE: 124 MMHG | HEART RATE: 90 BPM | BODY MASS INDEX: 42.99 KG/M2 | WEIGHT: 258 LBS | TEMPERATURE: 98 F | OXYGEN SATURATION: 98 % | HEIGHT: 65 IN

## 2019-05-10 DIAGNOSIS — Z13.220 SCREENING, LIPID: ICD-10-CM

## 2019-05-10 DIAGNOSIS — E66.01 MORBID OBESITY WITH BMI OF 40.0-44.9, ADULT (HCC): ICD-10-CM

## 2019-05-10 DIAGNOSIS — O24.414 INSULIN CONTROLLED GESTATIONAL DIABETES MELLITUS (GDM) DURING PREGNANCY, ANTEPARTUM: ICD-10-CM

## 2019-05-10 DIAGNOSIS — G43.809 OTHER MIGRAINE WITHOUT STATUS MIGRAINOSUS, NOT INTRACTABLE: Primary | ICD-10-CM

## 2019-05-10 PROBLEM — G43.909 MIGRAINE: Status: ACTIVE | Noted: 2019-05-10

## 2019-05-10 PROCEDURE — 83036 HEMOGLOBIN GLYCOSYLATED A1C: CPT | Performed by: FAMILY MEDICINE

## 2019-05-10 PROCEDURE — 99204 OFFICE O/P NEW MOD 45 MIN: CPT | Performed by: FAMILY MEDICINE

## 2019-05-10 RX ORDER — TOPIRAMATE 25 MG/1
CAPSULE, COATED PELLETS ORAL
Qty: 90 CAPSULE | Refills: 0 | Status: SHIPPED | OUTPATIENT
Start: 2019-05-10 | End: 2019-06-07 | Stop reason: DRUGHIGH

## 2019-05-18 ENCOUNTER — APPOINTMENT (EMERGENCY)
Dept: RADIOLOGY | Facility: HOSPITAL | Age: 34
End: 2019-05-18
Payer: COMMERCIAL

## 2019-05-18 ENCOUNTER — HOSPITAL ENCOUNTER (EMERGENCY)
Facility: HOSPITAL | Age: 34
Discharge: HOME | End: 2019-05-18
Attending: EMERGENCY MEDICINE
Payer: COMMERCIAL

## 2019-05-18 VITALS
TEMPERATURE: 98.6 F | BODY MASS INDEX: 41.27 KG/M2 | HEART RATE: 99 BPM | WEIGHT: 248 LBS | RESPIRATION RATE: 16 BRPM | OXYGEN SATURATION: 99 % | SYSTOLIC BLOOD PRESSURE: 132 MMHG | DIASTOLIC BLOOD PRESSURE: 60 MMHG

## 2019-05-18 DIAGNOSIS — M76.892 TENDINITIS OF LEFT KNEE: Primary | ICD-10-CM

## 2019-05-18 PROCEDURE — 73560 X-RAY EXAM OF KNEE 1 OR 2: CPT

## 2019-05-18 PROCEDURE — 96372 THER/PROPH/DIAG INJ SC/IM: CPT

## 2019-05-18 PROCEDURE — 99284 EMERGENCY DEPT VISIT MOD MDM: CPT | Performed by: PHYSICIAN ASSISTANT

## 2019-05-18 PROCEDURE — 99283 EMERGENCY DEPT VISIT LOW MDM: CPT

## 2019-05-18 RX ORDER — IBUPROFEN 800 MG/1
800 TABLET ORAL EVERY 6 HOURS PRN
Qty: 30 TABLET | Refills: 0 | Status: SHIPPED | OUTPATIENT
Start: 2019-05-18 | End: 2020-03-09 | Stop reason: ALTCHOICE

## 2019-05-18 RX ORDER — KETOROLAC TROMETHAMINE 30 MG/ML
30 INJECTION, SOLUTION INTRAMUSCULAR; INTRAVENOUS ONCE
Status: COMPLETED | OUTPATIENT
Start: 2019-05-18 | End: 2019-05-18

## 2019-05-18 RX ADMIN — KETOROLAC TROMETHAMINE 30 MG: 30 INJECTION, SOLUTION INTRAMUSCULAR at 14:23

## 2019-06-07 ENCOUNTER — OFFICE VISIT (OUTPATIENT)
Dept: FAMILY MEDICINE CLINIC | Facility: CLINIC | Age: 34
End: 2019-06-07
Payer: COMMERCIAL

## 2019-06-07 VITALS
DIASTOLIC BLOOD PRESSURE: 76 MMHG | SYSTOLIC BLOOD PRESSURE: 124 MMHG | WEIGHT: 251 LBS | OXYGEN SATURATION: 99 % | HEIGHT: 65 IN | BODY MASS INDEX: 41.82 KG/M2 | HEART RATE: 96 BPM

## 2019-06-07 DIAGNOSIS — G43.809 OTHER MIGRAINE WITHOUT STATUS MIGRAINOSUS, NOT INTRACTABLE: Primary | ICD-10-CM

## 2019-06-07 DIAGNOSIS — J02.9 PHARYNGITIS, UNSPECIFIED ETIOLOGY: ICD-10-CM

## 2019-06-07 DIAGNOSIS — R63.4 WEIGHT LOSS: ICD-10-CM

## 2019-06-07 PROCEDURE — 1036F TOBACCO NON-USER: CPT | Performed by: FAMILY MEDICINE

## 2019-06-07 PROCEDURE — 99214 OFFICE O/P EST MOD 30 MIN: CPT | Performed by: FAMILY MEDICINE

## 2019-06-07 PROCEDURE — 3008F BODY MASS INDEX DOCD: CPT | Performed by: FAMILY MEDICINE

## 2019-06-07 RX ORDER — TOPIRAMATE 50 MG/1
50 TABLET, FILM COATED ORAL EVERY 12 HOURS SCHEDULED
Qty: 60 TABLET | Refills: 0 | Status: SHIPPED | OUTPATIENT
Start: 2019-06-07 | End: 2019-07-07 | Stop reason: SDUPTHER

## 2019-06-26 ENCOUNTER — APPOINTMENT (OUTPATIENT)
Dept: LAB | Facility: HOSPITAL | Age: 34
End: 2019-06-26
Payer: COMMERCIAL

## 2019-06-26 DIAGNOSIS — Z13.220 SCREENING, LIPID: ICD-10-CM

## 2019-06-26 DIAGNOSIS — E66.01 MORBID OBESITY WITH BMI OF 40.0-44.9, ADULT (HCC): ICD-10-CM

## 2019-06-26 LAB
ALBUMIN SERPL BCP-MCNC: 3.5 G/DL (ref 3.5–5)
ALP SERPL-CCNC: 85 U/L (ref 46–116)
ALT SERPL W P-5'-P-CCNC: 23 U/L (ref 12–78)
ANION GAP SERPL CALCULATED.3IONS-SCNC: 11 MMOL/L (ref 4–13)
AST SERPL W P-5'-P-CCNC: 14 U/L (ref 5–45)
BILIRUB SERPL-MCNC: 0.2 MG/DL (ref 0.2–1)
BUN SERPL-MCNC: 16 MG/DL (ref 5–25)
CALCIUM SERPL-MCNC: 8.8 MG/DL (ref 8.3–10.1)
CHLORIDE SERPL-SCNC: 105 MMOL/L (ref 100–108)
CHOLEST SERPL-MCNC: 173 MG/DL (ref 50–200)
CO2 SERPL-SCNC: 26 MMOL/L (ref 21–32)
CREAT SERPL-MCNC: 1.12 MG/DL (ref 0.6–1.3)
GFR SERPL CREATININE-BSD FRML MDRD: 75 ML/MIN/1.73SQ M
GLUCOSE P FAST SERPL-MCNC: 104 MG/DL (ref 65–99)
HDLC SERPL-MCNC: 51 MG/DL (ref 40–60)
LDLC SERPL CALC-MCNC: 111 MG/DL (ref 0–100)
NONHDLC SERPL-MCNC: 122 MG/DL
POTASSIUM SERPL-SCNC: 4.2 MMOL/L (ref 3.5–5.3)
PROT SERPL-MCNC: 7.7 G/DL (ref 6.4–8.2)
SODIUM SERPL-SCNC: 142 MMOL/L (ref 136–145)
TRIGL SERPL-MCNC: 57 MG/DL
TSH SERPL DL<=0.05 MIU/L-ACNC: 0.96 UIU/ML (ref 0.36–3.74)

## 2019-06-26 PROCEDURE — 80061 LIPID PANEL: CPT

## 2019-06-26 PROCEDURE — 84443 ASSAY THYROID STIM HORMONE: CPT

## 2019-06-26 PROCEDURE — 36415 COLL VENOUS BLD VENIPUNCTURE: CPT

## 2019-06-26 PROCEDURE — 80053 COMPREHEN METABOLIC PANEL: CPT

## 2019-07-07 DIAGNOSIS — G43.809 OTHER MIGRAINE WITHOUT STATUS MIGRAINOSUS, NOT INTRACTABLE: ICD-10-CM

## 2019-07-07 RX ORDER — TOPIRAMATE 50 MG/1
50 TABLET, FILM COATED ORAL EVERY 12 HOURS SCHEDULED
Qty: 60 TABLET | Refills: 0 | Status: SHIPPED | OUTPATIENT
Start: 2019-07-07 | End: 2019-12-20 | Stop reason: SINTOL

## 2019-10-15 ENCOUNTER — OFFICE VISIT (OUTPATIENT)
Dept: URGENT CARE | Facility: CLINIC | Age: 34
End: 2019-10-15
Payer: COMMERCIAL

## 2019-10-15 ENCOUNTER — APPOINTMENT (OUTPATIENT)
Dept: RADIOLOGY | Facility: CLINIC | Age: 34
End: 2019-10-15
Payer: COMMERCIAL

## 2019-10-15 VITALS
SYSTOLIC BLOOD PRESSURE: 139 MMHG | TEMPERATURE: 97.6 F | BODY MASS INDEX: 44.76 KG/M2 | RESPIRATION RATE: 18 BRPM | WEIGHT: 262.2 LBS | OXYGEN SATURATION: 97 % | HEART RATE: 103 BPM | DIASTOLIC BLOOD PRESSURE: 74 MMHG | HEIGHT: 64 IN

## 2019-10-15 DIAGNOSIS — M25.571 ACUTE RIGHT ANKLE PAIN: ICD-10-CM

## 2019-10-15 DIAGNOSIS — M25.571 ACUTE RIGHT ANKLE PAIN: Primary | ICD-10-CM

## 2019-10-15 PROCEDURE — S9088 SERVICES PROVIDED IN URGENT: HCPCS | Performed by: PHYSICIAN ASSISTANT

## 2019-10-15 PROCEDURE — 73610 X-RAY EXAM OF ANKLE: CPT

## 2019-10-15 PROCEDURE — 99203 OFFICE O/P NEW LOW 30 MIN: CPT | Performed by: PHYSICIAN ASSISTANT

## 2019-10-15 NOTE — PROGRESS NOTES
[facility]        NAME: Sudha Freire is a 35 y o  female  : 1985    MRN: [id number]  DATE: 2019  TIME: 8:28 AM    Assessment and Plan   Acute right ankle pain [M25 [id number]]  1  Acute right ankle pain  XR ankle 3+ vw right         Patient Instructions     1  Right ankle pain  -Xray is negative for fracture as reviewed by myself  Patient was given ace bandage  -Do RICE protocol at home (rest, ice, compression, elevate)  -Wear ace/aircast and weight bear as tolerated  -Use tylenol/motrin as needed  -Follow-up with Orthopedics for re-evaluation    Go to ER with worsening symptoms, worsening pain, or any signs of distress    Chief Complaint     Chief Complaint   Patient presents with    Ankle Pain     right, 3 weeks         History of Present Illness       Patient presents today for evaluation of right ankle pain  Patient states that she injured her ankle 3 weeks ago when walking down the steps  She states that the steps came loose and her foot got caught  She continues to have pain to her ankle which she rates as a 4/10  Review of Systems   Review of Systems   Constitutional: Negative for chills and fever  Respiratory: Negative for shortness of breath  Cardiovascular: Negative for chest pain  Musculoskeletal: Positive for arthralgias  Skin: Negative for rash  Neurological: Negative for numbness  All other systems reviewed and are negative          Current Medications       Current Outpatient Medications:     ibuprofen (MOTRIN) 800 mg tablet, Take 1 tablet (800 mg total) by mouth every 6 (six) hours as needed for mild pain for up to 10 days, Disp: 30 tablet, Rfl: 0    topiramate (TOPAMAX) 50 MG tablet, TAKE 1 TABLET (50 MG TOTAL) BY MOUTH EVERY 12 (TWELVE) HOURS (Patient not taking: Reported on 10/15/2019), Disp: 60 tablet, Rfl: 0    Current Allergies     Allergies as of 10/15/2019    (No Known Allergies)            The following portions of the patient's history were reviewed and updated as appropriate: allergies, current medications, past family history, past medical history, past social history, past surgical history and problem list      Past Medical History:   Diagnosis Date    Asthma     childhood    Diabetes mellitus ([address] )     gestational    Elective      Resolved 2016     Hypertension     Migraine     Varicella     childhood       Past Surgical History:   Procedure Laterality Date    INDUCED          Family History   Problem Relation Age of Onset    Cancer Mother     Heart disease Mother     Stroke Mother     Other Mother     Hypertension Mother    Nick Bones Breast cancer Mother     Diabetes Father     Asthma Sister     Asthma Brother     Diabetes Paternal Grandmother     Asthma Brother     Asthma Brother     Asthma Sister     Asthma Sister          Medications have been verified  Objective   /74   Pulse 103   Temp 97 6 °F (36 4 °C) (Tympanic)   Resp 18   Ht 5' 4" (1 626 m)   Wt 119 kg (262 lb 3 2 oz)   LMP 10/14/2019   SpO2 97%   BMI 45 01 kg/m²        Physical Exam     Physical Exam   Constitutional: She is oriented to person, place, and time  She appears well-developed and well-nourished  No distress  Cardiovascular: Normal rate, regular rhythm and normal heart sounds  Pulmonary/Chest: Effort normal and breath sounds normal    Musculoskeletal:        Right ankle: She exhibits no swelling and no ecchymosis  Tenderness  Feet:    Neurological: She is alert and oriented to person, place, and time  Skin: Skin is warm and dry  Nursing note and vitals reviewed

## 2019-10-15 NOTE — PATIENT INSTRUCTIONS
1  Right ankle pain  -Xray is negative for fracture as reviewed by myself  -Do RICE protocol at home (rest, ice, compression, elevate)  -Wear ace/aircast and weight bear as tolerated  -Use tylenol/motrin as needed  -Follow-up with Orthopedics for re-evaluation    Go to ER with worsening symptoms, worsening pain, or any signs of distress

## 2019-11-19 ENCOUNTER — TELEPHONE (OUTPATIENT)
Dept: FAMILY MEDICINE CLINIC | Facility: CLINIC | Age: 34
End: 2019-11-19

## 2019-11-19 NOTE — TELEPHONE ENCOUNTER
Azeem Morataya can you call patient and let her know that she does need to come in at least for a check up

## 2019-11-19 NOTE — TELEPHONE ENCOUNTER
I talked to patient to schedule a flu shot and she asked if she needed a follow up with you  She was not sure if she needed a follow up apt with you concerning her migraines

## 2019-11-20 ENCOUNTER — IMMUNIZATIONS (OUTPATIENT)
Dept: FAMILY MEDICINE CLINIC | Facility: CLINIC | Age: 34
End: 2019-11-20
Payer: COMMERCIAL

## 2019-11-20 VITALS — TEMPERATURE: 97.7 F

## 2019-11-20 DIAGNOSIS — Z23 NEED FOR VACCINATION: Primary | ICD-10-CM

## 2019-11-20 PROCEDURE — 90686 IIV4 VACC NO PRSV 0.5 ML IM: CPT

## 2019-11-20 PROCEDURE — 90471 IMMUNIZATION ADMIN: CPT

## 2019-12-19 ENCOUNTER — TELEPHONE (OUTPATIENT)
Dept: OTHER | Facility: OTHER | Age: 34
End: 2019-12-19

## 2019-12-19 NOTE — TELEPHONE ENCOUNTER
Pt is returning a call from the office to reschedule her appointment  Appointment has been rescheduled

## 2019-12-20 ENCOUNTER — OFFICE VISIT (OUTPATIENT)
Dept: FAMILY MEDICINE CLINIC | Facility: CLINIC | Age: 34
End: 2019-12-20
Payer: COMMERCIAL

## 2019-12-20 VITALS
DIASTOLIC BLOOD PRESSURE: 90 MMHG | SYSTOLIC BLOOD PRESSURE: 134 MMHG | BODY MASS INDEX: 45.41 KG/M2 | RESPIRATION RATE: 18 BRPM | OXYGEN SATURATION: 100 % | HEIGHT: 64 IN | HEART RATE: 94 BPM | TEMPERATURE: 99.2 F | WEIGHT: 266 LBS

## 2019-12-20 DIAGNOSIS — Z86.32 H/O GESTATIONAL DIABETES MELLITUS, NOT CURRENTLY PREGNANT: ICD-10-CM

## 2019-12-20 DIAGNOSIS — G43.809 OTHER MIGRAINE WITHOUT STATUS MIGRAINOSUS, NOT INTRACTABLE: Primary | ICD-10-CM

## 2019-12-20 DIAGNOSIS — E66.01 MORBID OBESITY WITH BMI OF 45.0-49.9, ADULT (HCC): ICD-10-CM

## 2019-12-20 DIAGNOSIS — I10 ESSENTIAL HYPERTENSION: ICD-10-CM

## 2019-12-20 PROCEDURE — 3008F BODY MASS INDEX DOCD: CPT | Performed by: FAMILY MEDICINE

## 2019-12-20 PROCEDURE — 99214 OFFICE O/P EST MOD 30 MIN: CPT | Performed by: FAMILY MEDICINE

## 2019-12-20 PROCEDURE — 1036F TOBACCO NON-USER: CPT | Performed by: FAMILY MEDICINE

## 2019-12-20 RX ORDER — VERAPAMIL HYDROCHLORIDE 80 MG/1
80 TABLET ORAL 2 TIMES DAILY
Qty: 60 TABLET | Refills: 0 | Status: SHIPPED | OUTPATIENT
Start: 2019-12-20 | End: 2020-01-10

## 2019-12-20 NOTE — ASSESSMENT & PLAN NOTE
Will start verapamil daily and advised to check the bp's at home, record the numbers and take to f/u  Suggest she  a bp machine and take to f/u so we can check her machine

## 2019-12-20 NOTE — PATIENT INSTRUCTIONS
Discussed all with patient  Since the Topamax is giving you persistent side effects and not really helping with the headaches cut back the Topamax to 50 mg once daily this week and next week discontinue the Topamax altogether  Now, start the verapamil 60 mg twice daily  I strongly suggest you  your own blood pressure cuff and check the blood pressures once daily record the numbers and bring your numbers and the blood pressure cuff to follow up here in 3 weeks  No added salt at home  I also suggest you keep a migraine diary and bring that in as well  Continue to work on the weight loss  Blood sugar checked today at pt's request was 99  Consider referral to weight management

## 2019-12-20 NOTE — PROGRESS NOTES
Assessment/Plan:     Chronic Problems:  Migraine  Will cut back topamax to 50 mg daily this week and d/c next week  Will start verapamil daily in the am  Advised to keep a migraine diary  Essential hypertension  Will start verapamil daily and advised to check the bp's at home, record the numbers and take to f/u  Suggest she  a bp machine and take to f/u so we can check her machine  Morbid obesity with BMI of 45 0-49 9, adult ([facility] )  Advised to continue to work on the weight  H/O gestational diabetes mellitus, not currently pregnant  FBS today -> 99  Visit Diagnosis:  Diagnoses and all orders for this visit:    Other migraine without status migrainosus, not intractable  -     verapamil (CALAN) 80 mg tablet; Take 1 tablet (80 mg total) by mouth 2 (two) times a day    Essential hypertension    Morbid obesity with BMI of 45 0-49 9, adult ([facility] )    H/O gestational diabetes mellitus, not currently pregnant          Subjective:    Patient ID: Jenny Pang is a 29 y o  female  Pt is here for routine f/u appt  Still getting headaches on 50 mg bid and stopped taking meds for a while as she was dizzy  Still dizzy off the meds but not as bad  Headaches were better on meds  Also having bad headaches for the last 3 days  Does not check her bp's at home  H/O gestational diabetes but took a test and thinks that something may be wrong with her blood sugars  Back on topamax every 12 hours but still with headaches  H/O asthma as a child  Was off the topamax for a month  Takes meds as directed  The following portions of the patient's history were reviewed and updated as appropriate: allergies, current medications, past family history, past medical history, past social history, past surgical history and problem list     Review of Systems   Constitutional: Negative for chills, diaphoresis, fatigue and fever  HENT: Negative  Eyes: Negative      Respiratory: Negative for cough, shortness of breath and wheezing  Cardiovascular: Negative for chest pain and palpitations  Gastrointestinal: Negative  Genitourinary: Negative for dysuria, frequency and urgency  FDLMP - December 8th  Pt has a mirena   Neurological: Positive for dizziness, light-headedness and headaches  Psychiatric/Behavioral: Negative for dysphoric mood  The patient is nervous/anxious            /90   Pulse 94   Temp 99 2 °F (37 3 °C) (Tympanic)   Resp 18   Ht 5' 4" (1 626 m)   Wt 121 kg (266 lb)   SpO2 100%   BMI 45 66 kg/m²   Social History     Socioeconomic History    Marital status: /Civil Union     Spouse name: Not on file    Number of children: Not on file    Years of education: Not on file    Highest education level: Not on file   Occupational History    Not on file   Social Needs    Financial resource strain: Not on file    Food insecurity:     Worry: Not on file     Inability: Not on file    Transportation needs:     Medical: Not on file     Non-medical: Not on file   Tobacco Use    Smoking status: Never Smoker    Smokeless tobacco: Never Used   Substance and Sexual Activity    Alcohol use: No    Drug use: No    Sexual activity: Yes     Partners: Male     Birth control/protection: IUD     Comment:    Lifestyle    Physical activity:     Days per week: Not on file     Minutes per session: Not on file    Stress: Not on file   Relationships    Social connections:     Talks on phone: Not on file     Gets together: Not on file     Attends [employer] service: Not on file     Active member of club or organization: Not on file     Attends meetings of clubs or organizations: Not on file     Relationship status: Not on file    Intimate partner violence:     Fear of current or ex partner: Not on file     Emotionally abused: Not on file     Physically abused: Not on file     Forced sexual activity: Not on file   Other Topics Concern    Not on file   Social History Narrative    Always uses seat belt     Past Medical History:   Diagnosis Date    Asthma     childhood    Diabetes mellitus ([address] )     gestational    Elective      Resolved 2016     Hypertension     Migraine     Varicella     childhood     Family History   Problem Relation Age of Onset    Cancer Mother     Heart disease Mother    Rebecca Slipper Stroke Mother     Other Mother     Hypertension Mother    Rebecca Slipper Breast cancer Mother    Rebecca Slipper Diabetes Father     Asthma Sister     Asthma Brother     Diabetes Paternal [de-identified]     Asthma Brother     Asthma Brother     Asthma Sister     Asthma Sister      Past Surgical History:   Procedure Laterality Date    INDUCED          Current Outpatient Medications:     ibuprofen (MOTRIN) 800 mg tablet, Take 1 tablet (800 mg total) by mouth every 6 (six) hours as needed for mild pain for up to 10 days, Disp: 30 tablet, Rfl: 0    verapamil (CALAN) 80 mg tablet, Take 1 tablet (80 mg total) by mouth 2 (two) times a day, Disp: 60 tablet, Rfl: 0    No Known Allergies       Lab Review   No visits with results within 2 Month(s) from this visit  Latest known visit with results is:   Appointment on 2019   Component Date Value    TSH 3RD GENERATON 2019 0 957     Sodium 2019 142     Potassium 2019 4 2     Chloride 2019 105     CO2 2019 26     ANION GAP 2019 11     BUN 2019 16     Creatinine 2019 1 12     Glucose, Fasting 2019 104*    Calcium 2019 8 8     AST 2019 14     ALT 2019 23     Alkaline Phosphatase 2019 85     Total Protein 2019 7 7     Albumin 2019 3 5     Total Bilirubin 2019 0 20     eGFR 2019 75     Cholesterol 2019 173     Triglycerides 2019 57     HDL, Direct 2019 51     LDL Calculated 2019 111*    Non-HDL-Chol (CHOL-HDL) 2019 122         Imaging: No results found      Objective:     Physical Exam   Constitutional: She is oriented to person, place, and time  She appears well-developed and well-nourished  No distress  HENT:   Head: Normocephalic and atraumatic  Right Ear: External ear normal    Left Ear: External ear normal    Mouth/Throat: Oropharynx is clear and moist    Eyes: Pupils are equal, round, and reactive to light  Conjunctivae and EOM are normal  Right eye exhibits no discharge  Left eye exhibits no discharge  Neck: Normal range of motion  Neck supple  No thyromegaly present  Cardiovascular: Normal rate, regular rhythm and normal heart sounds  Exam reveals no friction rub  No murmur heard  Pulmonary/Chest: Effort normal and breath sounds normal  No respiratory distress  She has no wheezes  Abdominal: Soft  Bowel sounds are normal  There is no tenderness  Abdomen is obese  Musculoskeletal: Normal range of motion  She exhibits no edema, tenderness or deformity  Lymphadenopathy:     She has no cervical adenopathy  Neurological: She is alert and oriented to person, place, and time  No cranial nerve deficit  Skin: Skin is warm and dry  No rash noted  She is not diaphoretic  Psychiatric: She has a normal mood and affect  Her behavior is normal  Judgment and thought content normal          Patient Instructions   Discussed all with patient  Since the Topamax is giving you persistent side effects and not really helping with the headaches cut back the Topamax to 50 mg once daily this week and next week discontinue the Topamax altogether  Now, start the verapamil 60 mg twice daily  I strongly suggest you  your own blood pressure cuff and check the blood pressures once daily record the numbers and bring your numbers and the blood pressure cuff to follow up here in 3 weeks  No added salt at home  I also suggest you keep a migraine diary and bring that in as well  Continue to work on the weight loss  Blood sugar checked today at pt's request was 99  Consider referral to weight management  SAGRARIO Richardson    Portions of the record may have been created with voice recognition software  Occasional wrong word or "sound a like" substitutions may have occurred due to the inherent limitations of voice recognition software  Read the chart carefully and recognize, using context, where substitutions have occurred

## 2019-12-20 NOTE — ASSESSMENT & PLAN NOTE
Will cut back topamax to 50 mg daily this week and d/c next week  Will start verapamil daily in the am  Advised to keep a migraine diary

## 2020-01-10 ENCOUNTER — OFFICE VISIT (OUTPATIENT)
Dept: FAMILY MEDICINE CLINIC | Facility: CLINIC | Age: 35
End: 2020-01-10
Payer: COMMERCIAL

## 2020-01-10 VITALS
TEMPERATURE: 99 F | HEIGHT: 64 IN | OXYGEN SATURATION: 97 % | SYSTOLIC BLOOD PRESSURE: 130 MMHG | BODY MASS INDEX: 44.97 KG/M2 | WEIGHT: 263.4 LBS | HEART RATE: 93 BPM | DIASTOLIC BLOOD PRESSURE: 80 MMHG | RESPIRATION RATE: 12 BRPM

## 2020-01-10 DIAGNOSIS — I10 ESSENTIAL HYPERTENSION: ICD-10-CM

## 2020-01-10 DIAGNOSIS — G43.809 OTHER MIGRAINE WITHOUT STATUS MIGRAINOSUS, NOT INTRACTABLE: ICD-10-CM

## 2020-01-10 DIAGNOSIS — E66.01 MORBID OBESITY WITH BMI OF 45.0-49.9, ADULT (HCC): Primary | ICD-10-CM

## 2020-01-10 PROCEDURE — 3008F BODY MASS INDEX DOCD: CPT | Performed by: FAMILY MEDICINE

## 2020-01-10 PROCEDURE — 3075F SYST BP GE 130 - 139MM HG: CPT | Performed by: FAMILY MEDICINE

## 2020-01-10 PROCEDURE — 3079F DIAST BP 80-89 MM HG: CPT | Performed by: FAMILY MEDICINE

## 2020-01-10 PROCEDURE — 99214 OFFICE O/P EST MOD 30 MIN: CPT | Performed by: FAMILY MEDICINE

## 2020-01-10 RX ORDER — VERAPAMIL HYDROCHLORIDE 240 MG/1
240 TABLET, FILM COATED, EXTENDED RELEASE ORAL
Qty: 30 TABLET | Refills: 0 | Status: SHIPPED | OUTPATIENT
Start: 2020-01-10 | End: 2020-02-12

## 2020-01-10 NOTE — PROGRESS NOTES
BMI Counseling: Body mass index is 45 21 kg/m²  The BMI is above normal  Nutrition recommendations include decreasing portion sizes, encouraging healthy choices of fruits and vegetables, decreasing fast food intake, consuming healthier snacks, limiting drinks that contain sugar and moderation in carbohydrate intake  Assessment/Plan:     Chronic Problems: Morbid obesity with BMI of 45 0-49 9, adult ([facility] )  Needs to work on the weight  Essential hypertension  Will increase the verapamil to 240 mg daily  Continue to monitor the bp's at  Home but realize your machine is running high  Cut back on salt in the diet  Migraine  Doing better on verapamil  Will increase to 240 mg daily  Visit Diagnosis:  Diagnoses and all orders for this visit:    Morbid obesity with BMI of 45 0-49 9, adult ([facility] )    Essential hypertension  -     verapamil (CALAN-SR) 240 mg CR tablet; Take 1 tablet (240 mg total) by mouth daily at bedtime    Other migraine without status migrainosus, not intractable  -     verapamil (CALAN-SR) 240 mg CR tablet; Take 1 tablet (240 mg total) by mouth daily at bedtime          Subjective:    Patient ID: Brannon Cintron is a 29 y o  female  Pt is here for f/u on her bp's  Home bp's are 174/91, 138/89, 171/100 148/88, 149/94  Pt [id number] her cuff with her  BP by me 134/84 and her cuff 153/98  Thinks her headaches are tolerable but she feels when her pressure was high the headaches were worse  Takes all other meds as directed  No side effects noted  Pt is now off the topamax  The following portions of the patient's history were reviewed and updated as appropriate: allergies, current medications, past family history, past medical history, past social history, past surgical history and problem list     Review of Systems   Constitutional: Negative for chills, diaphoresis, fatigue and fever  HENT: Negative  Eyes: Negative      Respiratory: Negative for cough, shortness of breath and wheezing  Cardiovascular: Negative for chest pain and palpitations  Gastrointestinal: Negative  Genitourinary: Negative  Neurological: Positive for headaches (3 since the last visit  Normally would have had a headache every other day  )           /80 (BP Location: Right arm, Patient Position: Sitting, Cuff Size: Adult)   Pulse 93   Temp 99 °F (37 2 °C)   Resp 12   Ht 5' 4" (1 626 m)   Wt 119 kg (263 lb 6 4 oz)   SpO2 97%   BMI 45 21 kg/m²   Social History     Socioeconomic History    Marital status: /Civil Union     Spouse name: Not on file    Number of children: Not on file    Years of education: Not on file    Highest education level: Not on file   Occupational History    Not on file   Social Needs    Financial resource strain: Not on file    Food insecurity:     Worry: Not on file     Inability: Not on file    Transportation needs:     Medical: Not on file     Non-medical: Not on file   Tobacco Use    Smoking status: Never Smoker    Smokeless tobacco: Never Used   Substance and Sexual Activity    Alcohol use: No    Drug use: No    Sexual activity: Yes     Partners: Male     Birth control/protection: IUD     Comment:    Lifestyle    Physical activity:     Days per week: Not on file     Minutes per session: Not on file    Stress: Not on file   Relationships    Social connections:     Talks on phone: Not on file     Gets together: Not on file     Attends [employer] service: Not on file     Active member of club or organization: Not on file     Attends meetings of clubs or organizations: Not on file     Relationship status: Not on file    Intimate partner violence:     Fear of current or ex partner: Not on file     Emotionally abused: Not on file     Physically abused: Not on file     Forced sexual activity: Not on file   Other Topics Concern    Not on file   Social History Narrative    Always uses seat belt     Past Medical History:   Diagnosis Date    Asthma     childhood    Diabetes mellitus ([facility] )     gestational    Elective      Resolved 2016     Hypertension     Migraine     Varicella     childhood     Family History   Problem Relation Age of Onset    Cancer Mother     Heart disease Mother    Malloy Stroke Mother     Other Mother     Hypertension Mother    Malloy Breast cancer Mother    Malloy Diabetes Father     Asthma Sister     Asthma Brother     Diabetes Paternal [de-identified]     Asthma Brother     Asthma Brother     Asthma Sister     Asthma Sister      Past Surgical History:   Procedure Laterality Date    INDUCED          Current Outpatient Medications:     ibuprofen (MOTRIN) 800 mg tablet, Take 1 tablet (800 mg total) by mouth every 6 (six) hours as needed for mild pain for up to 10 days, Disp: 30 tablet, Rfl: 0    verapamil (CALAN-SR) 240 mg CR tablet, Take 1 tablet (240 mg total) by mouth daily at bedtime, Disp: 30 tablet, Rfl: 0    No Known Allergies       Lab Review   No visits with results within 2 Month(s) from this visit  Latest known visit with results is:   Appointment on 2019   Component Date Value    TSH 3RD GENERATON 2019 0 957     Sodium 2019 142     Potassium 2019 4 2     Chloride 2019 105     CO2 2019 26     ANION GAP 2019 11     BUN 2019 16     Creatinine 2019 1 12     Glucose, Fasting 2019 104*    Calcium 2019 8 8     AST 2019 14     ALT 2019 23     Alkaline Phosphatase 2019 85     Total Protein 2019 7 7     Albumin 2019 3 5     Total Bilirubin 2019 0 20     eGFR 2019 75     Cholesterol 2019 173     Triglycerides 2019 57     HDL, Direct 2019 51     LDL Calculated 2019 111*    Non-HDL-Chol (CHOL-HDL) 2019 122         Imaging: No results found  Objective:     Physical Exam   Constitutional: She is oriented to person, place, and time   She appears well-developed and well-nourished  No distress  HENT:   Head: Normocephalic and atraumatic  Right Ear: External ear normal    Left Ear: External ear normal    Eyes: Pupils are equal, round, and reactive to light  EOM are normal    Neck: Normal range of motion  Neck supple  Cardiovascular: Normal rate, regular rhythm and normal heart sounds  Pulmonary/Chest: Effort normal and breath sounds normal    Musculoskeletal: Normal range of motion  She exhibits no edema or deformity  Neurological: She is alert and oriented to person, place, and time  Skin: Skin is warm  She is not diaphoretic  Psychiatric: She has a normal mood and affect  Her behavior is normal  Judgment and thought content normal          Patient Instructions   Discussed all with patient  Your machine is running high from what I am hearing so just know that if you get high blood pressures it is probably also your machine  Cut back on salt in the diet continue to work on weight loss  Will increase the verapamil to 240 mg daily  Follow up here in about 3-4 weeks  Suggest you keep a food diary like My Fitness Pal or the Lose it [employer]  Calories should be less than 1200 daily spread out during the course of the day  Cut back on carbs and eat more proteins  Make better food choices  At least 6 to 8 glasses of water daily  Slowly increase the exercise and try to get up to 30 minutes at least 5 times weekly  The changes you make now are for life  SAGRARIO Richardson    Portions of the record may have been created with voice recognition software  Occasional wrong word or "sound a like" substitutions may have occurred due to the inherent limitations of voice recognition software  Read the chart carefully and recognize, using context, where substitutions have occurred

## 2020-01-10 NOTE — ASSESSMENT & PLAN NOTE
Will increase the verapamil to 240 mg daily  Continue to monitor the bp's at  Home but realize your machine is running high  Cut back on salt in the diet

## 2020-01-10 NOTE — PATIENT INSTRUCTIONS
Discussed all with patient  Your machine is running high from what I am hearing so just know that if you get high blood pressures it is probably also your machine  Cut back on salt in the diet continue to work on weight loss  Will increase the verapamil to 240 mg daily  Follow up here in about 3-4 weeks  Suggest you keep a food diary like My Fitness Pal or the Lose it [employer]  Calories should be less than 1200 daily spread out during the course of the day  Cut back on carbs and eat more proteins  Make better food choices  At least 6 to 8 glasses of water daily  Slowly increase the exercise and try to get up to 30 minutes at least 5 times weekly  The changes you make now are for life

## 2020-02-06 ENCOUNTER — OFFICE VISIT (OUTPATIENT)
Dept: FAMILY MEDICINE CLINIC | Facility: CLINIC | Age: 35
End: 2020-02-06
Payer: COMMERCIAL

## 2020-02-06 VITALS
RESPIRATION RATE: 16 BRPM | TEMPERATURE: 97.8 F | OXYGEN SATURATION: 96 % | HEIGHT: 64 IN | SYSTOLIC BLOOD PRESSURE: 130 MMHG | HEART RATE: 94 BPM | BODY MASS INDEX: 45.07 KG/M2 | WEIGHT: 264 LBS | DIASTOLIC BLOOD PRESSURE: 90 MMHG

## 2020-02-06 DIAGNOSIS — I10 ESSENTIAL HYPERTENSION: Primary | ICD-10-CM

## 2020-02-06 DIAGNOSIS — G43.809 OTHER MIGRAINE WITHOUT STATUS MIGRAINOSUS, NOT INTRACTABLE: ICD-10-CM

## 2020-02-06 PROCEDURE — 99214 OFFICE O/P EST MOD 30 MIN: CPT | Performed by: FAMILY MEDICINE

## 2020-02-06 PROCEDURE — 1036F TOBACCO NON-USER: CPT | Performed by: FAMILY MEDICINE

## 2020-02-06 RX ORDER — HYDROCHLOROTHIAZIDE 25 MG/1
25 TABLET ORAL DAILY
Qty: 30 TABLET | Refills: 1 | Status: SHIPPED | OUTPATIENT
Start: 2020-02-06 | End: 2020-03-09 | Stop reason: SDUPTHER

## 2020-02-06 NOTE — PATIENT INSTRUCTIONS
Discussed all with patient  There has been a very significant decrease in your blood pressure but you are not there yet  Will add hydrochlorothiazide in the morning make sure you're drinking at least 6-8 glasses of water a day  Stay on the verapamil as that has brought your blood pressure down and the headaches  Continue to work on the [employer] and do your boot camp  Will follow up here in about 4 weeks but bring her numbers from home with you  Avoid salt

## 2020-02-12 DIAGNOSIS — G43.809 OTHER MIGRAINE WITHOUT STATUS MIGRAINOSUS, NOT INTRACTABLE: ICD-10-CM

## 2020-02-12 DIAGNOSIS — I10 ESSENTIAL HYPERTENSION: ICD-10-CM

## 2020-02-12 RX ORDER — VERAPAMIL HYDROCHLORIDE 240 MG/1
240 TABLET, FILM COATED, EXTENDED RELEASE ORAL
Qty: 30 TABLET | Refills: 3 | Status: SHIPPED | OUTPATIENT
Start: 2020-02-12 | End: 2021-04-27 | Stop reason: SDDI

## 2020-03-09 ENCOUNTER — OFFICE VISIT (OUTPATIENT)
Dept: FAMILY MEDICINE CLINIC | Facility: CLINIC | Age: 35
End: 2020-03-09
Payer: COMMERCIAL

## 2020-03-09 VITALS
SYSTOLIC BLOOD PRESSURE: 128 MMHG | WEIGHT: 262 LBS | OXYGEN SATURATION: 98 % | BODY MASS INDEX: 44.73 KG/M2 | DIASTOLIC BLOOD PRESSURE: 74 MMHG | HEIGHT: 64 IN | HEART RATE: 78 BPM

## 2020-03-09 DIAGNOSIS — I10 ESSENTIAL HYPERTENSION: ICD-10-CM

## 2020-03-09 PROCEDURE — 3078F DIAST BP <80 MM HG: CPT | Performed by: FAMILY MEDICINE

## 2020-03-09 PROCEDURE — 1036F TOBACCO NON-USER: CPT | Performed by: FAMILY MEDICINE

## 2020-03-09 PROCEDURE — 99213 OFFICE O/P EST LOW 20 MIN: CPT | Performed by: FAMILY MEDICINE

## 2020-03-09 PROCEDURE — 3008F BODY MASS INDEX DOCD: CPT | Performed by: FAMILY MEDICINE

## 2020-03-09 PROCEDURE — 3074F SYST BP LT 130 MM HG: CPT | Performed by: FAMILY MEDICINE

## 2020-03-09 RX ORDER — HYDROCHLOROTHIAZIDE 25 MG/1
25 TABLET ORAL DAILY
Qty: 90 TABLET | Refills: 0 | Status: SHIPPED | OUTPATIENT
Start: 2020-03-09 | End: 2021-04-27 | Stop reason: ALTCHOICE

## 2020-03-09 NOTE — PATIENT INSTRUCTIONS
Discussed all with patient  Today blood pressure is at goal  Continue to take medications as prescribed  Please get labs drawn  Continue to work on weight loss, good job loosing 2 lbs since last visit  Will call with results

## 2020-03-09 NOTE — PROGRESS NOTES
Assessment/Plan:     Chronic Problems:  Essential hypertension  Currently at goal  Home readings are within normal  Continue with current therapy  Visit Diagnosis:  Diagnoses and all orders for this visit:    Essential hypertension  -     hydrochlorothiazide (HYDRODIURIL) 25 mg tablet; Take 1 tablet (25 mg total) by mouth daily          Subjective:    Patient ID: Delicia Mcguire is a 29 y o  female  Jaime Patrick is here today to follow up on blood pressure  She forgot her home readings but reports that her highest home SBP was 132/84 the average being 118-122/76  Jaime Patrick has no side effects from the HCTZ  She forgot to get labs drawn she has a lot going on, but will get labs drawn  Takes medications as prescribed and does not have any side effects  Pt does notice a difference on the new bp meds  The following portions of the patient's history were reviewed and updated as appropriate: allergies, current medications, past family history, past medical history, past social history, past surgical history and problem list     Review of Systems   Constitutional: Negative for activity change, appetite change, chills, diaphoresis, fatigue and fever  HENT: Negative for congestion, ear pain, postnasal drip, rhinorrhea, sinus pressure, sinus pain, sneezing and sore throat  Eyes: Negative for pain, discharge, redness, itching and visual disturbance  Respiratory: Negative for cough, chest tightness, shortness of breath and wheezing  Cardiovascular: Negative for chest pain, palpitations and leg swelling  Gastrointestinal: Positive for diarrhea (Had diarrhea when first started HCTZ but it has since resolved)  Negative for constipation, nausea and vomiting  Endocrine: Negative for cold intolerance, heat intolerance, polydipsia, polyphagia and polyuria  Genitourinary: Negative for dysuria and urgency     Musculoskeletal: Positive for back pain (always has back pain, since epidural during first pregnancy)  Negative for arthralgias, myalgias, neck pain and neck stiffness  Skin: Negative for rash and wound  Allergic/Immunologic: Negative for environmental allergies and food allergies  Neurological: Negative for dizziness, tremors, syncope, weakness, light-headedness, numbness and headaches  Psychiatric/Behavioral: Positive for sleep disturbance (sleeps about 5-6 hours a night  wakes in the middle of the night and sometimes can fall back to sleep )  Negative for dysphoric mood  The patient is not nervous/anxious            /74   Pulse 78   Ht 5' 4" (1 626 m)   Wt 119 kg (262 lb)   SpO2 98%   BMI 44 97 kg/m²   Social History     Socioeconomic History    Marital status: /Civil Union     Spouse name: Not on file    Number of children: Not on file    Years of education: Not on file    Highest education level: Not on file   Occupational History    Not on file   Social Needs    Financial resource strain: Not on file    Food insecurity:     Worry: Not on file     Inability: Not on file    Transportation needs:     Medical: Not on file     Non-medical: Not on file   Tobacco Use    Smoking status: Never Smoker    Smokeless tobacco: Never Used   Substance and Sexual Activity    Alcohol use: No    Drug use: No    Sexual activity: Yes     Partners: Male     Birth control/protection: IUD     Comment:    Lifestyle    Physical activity:     Days per week: Not on file     Minutes per session: Not on file    Stress: Not on file   Relationships    Social connections:     Talks on phone: Not on file     Gets together: Not on file     Attends Sikhism service: Not on file     Active member of club or organization: Not on file     Attends meetings of clubs or organizations: Not on file     Relationship status: Not on file    Intimate partner violence:     Fear of current or ex partner: Not on file     Emotionally abused: Not on file     Physically abused: Not on file     Forced sexual activity: Not on file   Other Topics Concern    Not on file   Social History Narrative    Always uses seat belt     Past Medical History:   Diagnosis Date    Asthma     childhood    Diabetes mellitus ([address] )     gestational    Elective      Resolved 2016     Hypertension     Migraine     Varicella     childhood     Family History   Problem Relation Age of Onset    Cancer Mother     Heart disease Mother     Stroke Mother     Other Mother     Hypertension Mother    [facility] Breast cancer Mother    [facility] Diabetes Father     Asthma Sister     Asthma Brother     Diabetes Paternal [de-identified]     Asthma Brother     Asthma Brother     Asthma Sister     Asthma Sister      Past Surgical History:   Procedure Laterality Date    INDUCED          Current Outpatient Medications:     hydrochlorothiazide (HYDRODIURIL) 25 mg tablet, Take 1 tablet (25 mg total) by mouth daily, Disp: 90 tablet, Rfl: 0    levonorgestrel (MIRENA) 20 MCG/24HR IUD, 1 each by Intrauterine route once, Disp: , Rfl:     verapamil (CALAN-SR) 240 mg CR tablet, TAKE 1 TABLET (240 MG TOTAL) BY MOUTH DAILY AT BEDTIME, Disp: 30 tablet, Rfl: 3    No Known Allergies       Lab Review   No visits with results within 2 Month(s) from this visit     Latest known visit with results is:   Appointment on 2019   Component Date Value    TSH 3RD GENERATON 2019 0 957     Sodium 2019 142     Potassium 2019 4 2     Chloride 2019 105     CO2 2019 26     ANION GAP 2019 11     BUN 2019 16     Creatinine 2019 1 12     Glucose, Fasting 2019 104*    Calcium 2019 8 8     AST 2019 14     ALT 2019 23     Alkaline Phosphatase 2019 85     Total Protein 2019 7 7     Albumin 2019 3 5     Total Bilirubin 2019 0 20     eGFR 2019 75     Cholesterol 2019 173     Triglycerides 2019 57     HDL, Direct 2019 51     LDL Calculated 06/26/2019 111*    Non-HDL-Chol (CHOL-HDL) 06/26/2019 122         Imaging: No results found  Objective:     Physical Exam   Constitutional: She is oriented to person, place, and time  She appears well-developed and well-nourished  No distress  HENT:   Head: Normocephalic and atraumatic  Right Ear: External ear normal    Left Ear: External ear normal    Nose: Nose normal    Mouth/Throat: Oropharynx is clear and moist    Eyes: Pupils are equal, round, and reactive to light  Conjunctivae and EOM are normal  Right eye exhibits no discharge  Left eye exhibits no discharge  Neck: Normal range of motion  Neck supple  No thyromegaly present  Cardiovascular: Normal rate, regular rhythm and normal heart sounds  Exam reveals no friction rub  No murmur heard  Pulmonary/Chest: Effort normal and breath sounds normal  No respiratory distress  She has no wheezes  She has no rales  She exhibits no tenderness  Abdominal: Soft  Bowel sounds are normal  There is no tenderness  Musculoskeletal: Normal range of motion  She exhibits no edema, tenderness or deformity  Lymphadenopathy:     She has no cervical adenopathy  Neurological: She is alert and oriented to person, place, and time  No cranial nerve deficit  Skin: Skin is warm and dry  No rash noted  She is not diaphoretic  Psychiatric: She has a normal mood and affect  Her behavior is normal  Judgment and thought content normal          Patient Instructions   Discussed all with patient  Today blood pressure is at goal  Continue to take medications as prescribed  Please get labs drawn  Continue to work on weight loss, good job loosing 2 lbs since last visit  Will call with results  SAGRARIO Richardson    Portions of the record may have been created with voice recognition software  Occasional wrong word or "sound a like" substitutions may have occurred due to the inherent limitations of voice recognition software    Read the chart carefully and recognize, using context, where substitutions have occurred

## 2021-01-25 ENCOUNTER — TELEPHONE (OUTPATIENT)
Dept: OBGYN CLINIC | Facility: CLINIC | Age: 36
End: 2021-01-25

## 2021-01-25 DIAGNOSIS — B96.89 BV (BACTERIAL VAGINOSIS): Primary | ICD-10-CM

## 2021-01-25 DIAGNOSIS — N76.0 BV (BACTERIAL VAGINOSIS): Primary | ICD-10-CM

## 2021-01-25 RX ORDER — METRONIDAZOLE 7.5 MG/G
GEL VAGINAL
Qty: 50 G | Refills: 0 | Status: SHIPPED | OUTPATIENT
Start: 2021-01-25 | End: 2021-01-30

## 2021-01-25 NOTE — TELEPHONE ENCOUNTER
No updated consent on file  Lm to cb to discuss symptoms  Spoke to pt and she said she had a white d/c not really any itching and foul odor  Pt usually sees AL  Per protocols for BV I put in for metrogel at [facility] x 5 nights and sent to sign

## 2021-01-26 ENCOUNTER — TELEPHONE (OUTPATIENT)
Dept: OBGYN CLINIC | Facility: CLINIC | Age: 36
End: 2021-01-26

## 2021-01-26 NOTE — TELEPHONE ENCOUNTER
Patient just picked up metrogel for BV, she just got her period today  Should she wait to use it after her cycle or ok to use during period

## 2021-02-12 ENCOUNTER — TELEPHONE (OUTPATIENT)
Dept: OBGYN CLINIC | Facility: CLINIC | Age: 36
End: 2021-02-12

## 2021-02-12 DIAGNOSIS — N76.0 ACUTE VAGINITIS: Primary | ICD-10-CM

## 2021-02-12 RX ORDER — FLUCONAZOLE 150 MG/1
TABLET ORAL
Qty: 2 TABLET | Refills: 0 | Status: SHIPPED | OUTPATIENT
Start: 2021-02-12 | End: 2021-02-14

## 2021-02-12 NOTE — TELEPHONE ENCOUNTER
Pt believes she has a yeast infections is having vaginal itching and some discharge, would like medication

## 2021-03-23 ENCOUNTER — TELEPHONE (OUTPATIENT)
Dept: FAMILY MEDICINE CLINIC | Facility: CLINIC | Age: 36
End: 2021-03-23

## 2021-03-23 ENCOUNTER — TELEMEDICINE (OUTPATIENT)
Dept: FAMILY MEDICINE CLINIC | Facility: CLINIC | Age: 36
End: 2021-03-23
Payer: COMMERCIAL

## 2021-03-23 DIAGNOSIS — H00.011 HORDEOLUM EXTERNUM OF RIGHT UPPER EYELID: Primary | ICD-10-CM

## 2021-03-23 PROCEDURE — 99213 OFFICE O/P EST LOW 20 MIN: CPT | Performed by: NURSE PRACTITIONER

## 2021-03-23 NOTE — PATIENT INSTRUCTIONS
Stye   WHAT YOU NEED TO KNOW:   A stye is a lump on the edge or inside of your eyelid caused by an infection  A stye can form on your upper or lower eyelid  It usually goes away in 2 to 4 days  DISCHARGE INSTRUCTIONS:   Medicines:   · Antibiotic medicine: This is given as an ointment to put into your eye  It is used to fight an infection caused by bacteria  Use as directed  · Take your medicine as directed  Contact your healthcare provider if you think your medicine is not helping or if you have side effects  Tell him of her if you are allergic to any medicine  Keep a list of the medicines, vitamins, and herbs you take  Include the amounts, and when and why you take them  Bring the list or the pill bottles to follow-up visits  Carry your medicine list with you in case of an emergency  Follow up with your healthcare provider as directed:  Write down your questions so you remember to ask them during your visits  Self-care:   · Use warm compresses: This will help decrease swelling and pain  Wet a clean washcloth with warm water and place it on your eye for 10 to 15 minutes, 3 to 4 times each day or as directed  · Keep your hands away from your eye: This helps to prevent the spread of the infection to other parts of the eye  Wash your hands often with soap and dry with a clean towel  Do not squeeze the stye  · Do not use eye makeup:  Do not wear eye makeup while you have a stye  Eye makeup may carry bacteria and cause another stye  Throw away eye makeup and brushes used to apply the makeup  Use new eye makeup after the stye has gone away  Do not share eye makeup with others  · Prevent another stye:  Wash your face and clean your eyelashes every day  Remove eye makeup with makeup remover  This helps to completely remove eye makeup without heavy rubbing  Contact your healthcare provider if:   · You have redness and discharge around your eye, and your eye pain is getting worse      · Your vision changes  · The stye has not gone away within 7 days  · The stye comes back within a short period of time after treatment  · You have questions or concerns about your condition or care  © Copyright [address] Information is for End User's use only and may not be sold, redistributed or otherwise used for commercial purposes  All illustrations and images included in CareNotes® are the copyrighted property of A D A M , Inc  or [facility] Adrian Darden   The above information is an  only  It is not intended as medical advice for individual conditions or treatments  Talk to your doctor, nurse or pharmacist before following any medical regimen to see if it is safe and effective for you

## 2021-03-23 NOTE — PROGRESS NOTES
Virtual Regular Visit      Assessment/Plan:    Problem List Items Addressed This Visit        Other    Hordeolum externum of right upper eyelid - Primary     Right eye stye  Reports improvement  Continue with warm compresses  May apply allergy eyedrops for itching  Call office if no improvement in 2 days  May evaluate the need for antibiotic eyedrops, if it converts the bacterial infection  Reason for visit is   Chief Complaint   Patient presents with    Virtual Regular Visit        Encounter provider SAGRARIO Irwin    Provider located at [address] [place] [address] [address] [facility]  [address] [place] [address] [address]      Recent Visits  No visits were found meeting these conditions  Showing recent visits within past 7 days and meeting all other requirements     Today's Visits  Date Type Provider Dept   03/23/21 Telemedicine Yuliya Quintana, [facility]   03/23/21 Telephone [facility] [address] today's visits and meeting all other requirements     Future Appointments  No visits were found meeting these conditions  Showing future appointments within next 150 days and meeting all other requirements        The patient was identified by name and date of birth  Camilla Gunderson was informed that this is a telemedicine visit and that the visit is being conducted through [employer] and patient was informed that this is not a secure, HIPAA-compliant platform  She agrees to proceed     My office door was closed  No one else was in the room  She acknowledged consent and understanding of privacy and security of the video platform  The patient has agreed to participate and understands they can discontinue the visit at any time  Patient is aware this is a billable service  Subjective  Camilla Gunderson is a 28 y o  female          Is being seen for complaints of right eye drainage  Reports couple days ago she went to the gym had sweat in her eyes  subsequently got a stye , has been treating with warm compresses  Reports that it has been resolving continues to have some itchiness  Some mild drainage  No redness  No visual disturbance  Denies any fevers or chills       Past Medical History:   Diagnosis Date    Asthma     childhood    Diabetes mellitus ([facility] [address] )     gestational    Elective      Resolved 2016     Hypertension     Migraine     Varicella     childhood       Past Surgical History:   Procedure Laterality Date    INDUCED          Current Outpatient Medications   Medication Sig Dispense Refill    hydrochlorothiazide (HYDRODIURIL) 25 mg tablet Take 1 tablet (25 mg total) by mouth daily 90 tablet 0    levonorgestrel (MIRENA) 20 MCG/24HR IUD 1 each by Intrauterine route once      verapamil (CALAN-SR) 240 mg CR tablet TAKE 1 TABLET (240 MG TOTAL) BY MOUTH DAILY AT BEDTIME 30 tablet 3     No current facility-administered medications for this visit  No Known Allergies    Review of Systems   Constitutional: Negative  Negative for fatigue and fever  HENT: Negative  Eyes: Positive for pain, discharge and redness  Negative for visual disturbance  Respiratory: Negative  Cardiovascular: Negative  Gastrointestinal: Negative  Endocrine: Negative  Genitourinary: Negative  Musculoskeletal: Negative  Skin: Negative  Allergic/Immunologic: Negative  Neurological: Negative  Psychiatric/Behavioral: Negative  Video Exam    There were no vitals filed for this visit  Physical Exam  Constitutional:       Appearance: Normal appearance  She is well-developed  Eyes:      General: No scleral icterus  Right eye: No discharge  Pupils: Pupils are equal, round, and reactive to light  Neck:      Musculoskeletal: Normal range of motion     Pulmonary:      Effort: Pulmonary effort is normal    Musculoskeletal: Normal range of motion  Skin:     General: Skin is dry  Neurological:      General: No focal deficit present  Mental Status: She is alert and oriented to person, place, and time  Psychiatric:         Mood and Affect: Mood normal          Behavior: Behavior normal          Thought Content: Thought content normal          Judgment: Judgment normal           I spent 15 minutes directly with the patient during this visit      Aldo [address] acknowledges that she has consented to an online visit or consultation  She understands that the online visit is based solely on information provided by her, and that, in the absence of a face-to-face physical evaluation by the physician, the diagnosis she receives is both limited and provisional in terms of accuracy and completeness  This is not intended to replace a full medical face-to-face evaluation by the physician  José Miguel Kaufman understands and accepts these terms

## 2021-03-23 NOTE — PROGRESS NOTES
Virtual Regular Visit      Assessment/Plan:    Problem List Items Addressed This Visit     None               Reason for visit is No chief complaint on file  Encounter provider SAGRARIO Resendiz    Provider located at [place] [address] [address] [address]  [phone]      Recent Visits  No visits were found meeting these conditions  Showing recent visits within past 7 days and meeting all other requirements     Today's Visits  Date Type Provider Dept   21 Telephone [address] [facility] today's visits and meeting all other requirements     Future Appointments  No visits were found meeting these conditions  Showing future appointments within next 150 days and meeting all other requirements        The patient was identified by name and date of birth  José Miguel Kaufman was informed that this is a telemedicine visit and that the visit is being conducted through {AMB CORONAVIRUS VISIT WNGNDE:09505}  {Telemedicine confidentiality :58338} {Telemedicine participants:92642}  She acknowledged consent and understanding of privacy and security of the video platform  The patient has agreed to participate and understands they can discontinue the visit at any time  Patient is aware this is a billable service  Subjective  José Miguel Kaufman is a 28 y o  female ***         HPI     Past Medical History:   Diagnosis Date    Asthma     childhood    Diabetes mellitus ([facility] [address] )     gestational    Elective      Resolved 2016     Hypertension     Migraine     Varicella     childhood       Past Surgical History:   Procedure Laterality Date    INDUCED          Current Outpatient Medications   Medication Sig Dispense Refill    hydrochlorothiazide (HYDRODIURIL) 25 mg tablet Take 1 tablet (25 mg total) by mouth daily 90 tablet 0    levonorgestrel (MIRENA) 20 MCG/24HR IUD 1 each by Intrauterine route once      verapamil (CALAN-SR) 240 mg CR tablet TAKE 1 TABLET (240 MG TOTAL) BY MOUTH DAILY AT BEDTIME 30 tablet 3     No current facility-administered medications for this visit  No Known Allergies    Review of Systems    Video Exam    There were no vitals filed for this visit  Physical Exam     {covid time spent:48957}      VIRTUAL VISIT DISCLAIMER    Saumya Walters acknowledges that she has consented to an online visit or consultation  She understands that the online visit is based solely on information provided by her, and that, in the absence of a face-to-face physical evaluation by the physician, the diagnosis she receives is both limited and provisional in terms of accuracy and completeness  This is not intended to replace a full medical face-to-face evaluation by the physician  Saumya Walters understands and accepts these terms

## 2021-03-23 NOTE — ASSESSMENT & PLAN NOTE
Right eye stye  Reports improvement  Continue with warm compresses  May apply allergy eyedrops for itching  Call office if no improvement in 2 days  May evaluate the need for antibiotic eyedrops, if it converts the bacterial infection

## 2021-04-27 ENCOUNTER — OFFICE VISIT (OUTPATIENT)
Dept: FAMILY MEDICINE CLINIC | Facility: CLINIC | Age: 36
End: 2021-04-27
Payer: COMMERCIAL

## 2021-04-27 VITALS
HEIGHT: 64 IN | BODY MASS INDEX: 45.58 KG/M2 | SYSTOLIC BLOOD PRESSURE: 140 MMHG | WEIGHT: 267 LBS | DIASTOLIC BLOOD PRESSURE: 100 MMHG | OXYGEN SATURATION: 96 % | RESPIRATION RATE: 16 BRPM | TEMPERATURE: 98.4 F | HEART RATE: 104 BPM

## 2021-04-27 DIAGNOSIS — H00.011 HORDEOLUM EXTERNUM OF RIGHT UPPER EYELID: ICD-10-CM

## 2021-04-27 DIAGNOSIS — Z00.00 PHYSICAL EXAM: Primary | ICD-10-CM

## 2021-04-27 DIAGNOSIS — Z12.11 SCREENING FOR MALIGNANT NEOPLASM OF COLON: ICD-10-CM

## 2021-04-27 DIAGNOSIS — I10 ESSENTIAL HYPERTENSION: ICD-10-CM

## 2021-04-27 DIAGNOSIS — Z13.220 SCREENING, LIPID: ICD-10-CM

## 2021-04-27 DIAGNOSIS — Z01.419 ROUTINE GYNECOLOGICAL EXAMINATION: ICD-10-CM

## 2021-04-27 PROCEDURE — 99395 PREV VISIT EST AGE 18-39: CPT | Performed by: FAMILY MEDICINE

## 2021-04-27 PROCEDURE — 3008F BODY MASS INDEX DOCD: CPT | Performed by: FAMILY MEDICINE

## 2021-04-27 PROCEDURE — 3725F SCREEN DEPRESSION PERFORMED: CPT | Performed by: FAMILY MEDICINE

## 2021-04-27 PROCEDURE — 1036F TOBACCO NON-USER: CPT | Performed by: FAMILY MEDICINE

## 2021-04-27 RX ORDER — ERYTHROMYCIN 5 MG/G
0.5 OINTMENT OPHTHALMIC
Qty: 3.5 G | Refills: 0 | Status: SHIPPED | OUTPATIENT
Start: 2021-04-27 | End: 2021-06-28 | Stop reason: ALTCHOICE

## 2021-04-27 RX ORDER — AMLODIPINE BESYLATE 5 MG/1
5 TABLET ORAL DAILY
Qty: 30 TABLET | Refills: 0 | Status: SHIPPED | OUTPATIENT
Start: 2021-04-27 | End: 2021-05-24

## 2021-04-27 NOTE — ASSESSMENT & PLAN NOTE
Patient was given the option weight loss with food calorie counting, bariatric evaluation, or phentermine when her blood pressure comes down  Will refer to bariatric

## 2021-04-27 NOTE — PROGRESS NOTES
PHYSICAL EXAM    Subjective:    Patient ID: Armand Harp is a 28 y o  female  Pt is here for physical exam  Stopped her bp meds as she never called for refills  Reports at home bp was in the 120's/90's  Feels well but with slight headache that she thinks she got from rushing  Works at [employer], but right now she is laid off  Not currently on any meds  Pt has no special concerns today  Review of Systems   Constitutional: Negative for chills, diaphoresis, fatigue and fever  HENT: Negative  Eyes:        Had a stye on the right eye from the gym  Used warm compresses and no longer tender but still swollen and itchy  Respiratory: Negative for cough, shortness of breath and wheezing  Cardiovascular: Negative for chest pain, palpitations and leg swelling  Gastrointestinal: Negative for abdominal pain, constipation, diarrhea, nausea and vomiting  Genitourinary: Negative for dysuria, frequency and urgency  FDLMP - April 21st  Has a mirena  Musculoskeletal: Negative for arthralgias, back pain and myalgias  Skin: Negative for rash and wound  Neurological: Positive for headaches (this am  Has h/o migraines which are good lately,  )  Negative for dizziness and light-headedness  Psychiatric/Behavioral: Negative for dysphoric mood and sleep disturbance  The patient is not nervous/anxious            The following portions of the patient's history were reviewed and updated as appropriate: allergies, current medications, past family history, past medical history, past social history, past surgical history and problem list     /100 (BP Location: Right arm)   Pulse 104   Temp 98 4 °F (36 9 °C)   Resp 16   Ht 5' 4" (1 626 m)   Wt 121 kg (267 lb)   SpO2 96%   BMI 45 83 kg/m²   Social History     Socioeconomic History    Marital status: /Civil Union     Spouse name: Not on file    Number of children: Not on file    Years of education: Not on file   [employer] education level: Not on file   Occupational History    Not on file   Social Needs    Financial resource strain: Not on file    Food insecurity     Worry: Not on file     Inability: Not on file    Transportation needs     Medical: Not on file     Non-medical: Not on file   Tobacco Use    Smoking status: Never Smoker    Smokeless tobacco: Never Used   Substance and Sexual Activity    Alcohol use: No    Drug use: No    Sexual activity: Yes     Partners: Male     Birth control/protection: I U D       Comment:    Lifestyle    Physical activity     Days per week: Not on file     Minutes per session: Not on file    Stress: Not on file   Relationships    Social connections     Talks on phone: Not on file     Gets together: Not on file     Attends Mandaeism service: Not on file     Active member of club or organization: Not on file     Attends meetings of clubs or organizations: Not on file     Relationship status: Not on file    Intimate partner violence     Fear of current or ex partner: Not on file     Emotionally abused: Not on file     Physically abused: Not on file     Forced sexual activity: Not on file   Other Topics Concern    Not on file   Social History Narrative    Always uses seat belt     Past Medical History:   Diagnosis Date    Asthma     childhood    Diabetes mellitus ([facility] [address] )     gestational    Elective      Resolved 2016     Hypertension     Migraine     Varicella     childhood     Family History   Problem Relation Age of Onset    Cancer Mother     Heart disease Mother     Stroke Mother     Other Mother     Hypertension Mother    Pilar Mare Breast cancer Mother     Diabetes Father     Asthma Sister     Asthma Brother     Diabetes Paternal Grandmother     Asthma Brother     Asthma Brother     Asthma Sister     Asthma Sister      Past Surgical History:   Procedure Laterality Date    INDUCED          Current Outpatient Medications:     levonorgestrel (MIRENA) 20 MCG/24HR IUD, 1 each by Intrauterine route once, Disp: , Rfl:     amLODIPine (NORVASC) 5 mg tablet, TAKE 1 TABLET BY MOUTH EVERY DAY, Disp: 30 tablet, Rfl: 3    erythromycin (ILOTYCIN) ophthalmic ointment, Administer 0 5 inches to the right eye daily at bedtime (Patient not taking: Reported on 6/8/2021), Disp: 3 5 g, Rfl: 0    fluconazole (DIFLUCAN) 150 mg tablet, Take 1 tablet (150 mg total) by mouth once for 1 dose Once and repeat in 3 days, Disp: 2 tablet, Rfl: 0    metroNIDAZOLE (METROGEL) 0 75 % vaginal gel, Insert 1 application into the vagina daily at bedtime for 5 days, Disp: 45 g, Rfl: 0    CBC:     Chemistry Profile:   Results from last 6 Months   Lab Units 04/30/21  1135   POTASSIUM mmol/L 4 2   CHLORIDE mmol/L 103   CO2 mmol/L 30   BUN mg/dL 12   CREATININE mg/dL 1 02   GLUCOSE FASTING mg/dL 109*   CALCIUM mg/dL 8 5   AST U/L 19   ALT U/L 34   ALK PHOS U/L 83   EGFR ml/min/1 73sq m 82     Coagulation Studies:     Endocrine Studies:   Results from last 6 Months   Lab Units 04/30/21  1135   TSH 3RD GENERATON uIU/mL 0 970     Urinalysis:   Lab Results   Component Value Date    COLORU Yellow 04/30/2021    COLORU Preble 01/13/2015    CLARITYU Clear 04/30/2021    CLARITYU Cloudy 01/13/2015    SPECGRAV 1 020 04/30/2021    SPECGRAV 1 020 01/13/2015    PHUR 7 0 04/30/2021    PHUR 7 0 01/10/2019    PHUR 5 5 01/13/2015    LEUKOCYTESUR Negative 04/30/2021    LEUKOCYTESUR Small (A) 01/13/2015    NITRITE Negative 04/30/2021    NITRITE Negative 01/13/2015    PROTEINUA Trace (A) 01/13/2015    GLUCOSEU Negative 04/30/2021    GLUCOSEU Negative 01/13/2015    KETONESU Negative 04/30/2021    KETONESU Trace (A) 01/13/2015    BILIRUBINUR Negative 04/30/2021    BILIRUBINUR INTERFERENCE (A) 01/13/2015    BLOODU Negative 04/30/2021    BLOODU Large (A) 01/13/2015    AMYLASE: No results found for: AMYLASE    Imaging: No results found      Last pap: 2016  Last mammo: never  Last colonoscopy: never  Last fit/cologuard: never  Last eye exam: about 2 years ago with no new visual changes  Last dental appt: about 1 year ago but has appt pending  Body mass index is 45 83 kg/m²  Objective:     Physical Exam  Vitals signs and nursing note reviewed  Constitutional:       General: She is not in acute distress  Appearance: Normal appearance  She is well-developed  She is not ill-appearing, toxic-appearing or diaphoretic  HENT:      Head: Normocephalic and atraumatic  Right Ear: Tympanic membrane, ear canal and external ear normal  There is no impacted cerumen  Left Ear: Tympanic membrane, ear canal and external ear normal  There is no impacted cerumen  Nose: Nose normal  No congestion  Mouth/Throat:      Mouth: Mucous membranes are moist       Pharynx: No oropharyngeal exudate  Eyes:      General:         Right eye: No discharge  Left eye: No discharge  Extraocular Movements: Extraocular movements intact  Conjunctiva/sclera: Conjunctivae normal       Pupils: Pupils are equal, round, and reactive to light  Comments: Stye noted right upper lid lateral aspect  Neck:      Musculoskeletal: Normal range of motion and neck supple  No neck rigidity  Cardiovascular:      Rate and Rhythm: Normal rate and regular rhythm  Heart sounds: No murmur  Pulmonary:      Effort: Pulmonary effort is normal  No respiratory distress  Breath sounds: Normal breath sounds  Abdominal:      General: Abdomen is flat  Bowel sounds are normal       Palpations: Abdomen is soft  Comments: Abdomen is morbidly obese  Musculoskeletal: Normal range of motion  General: No deformity  Right lower leg: No edema  Left lower leg: No edema  Skin:     General: Skin is warm  Capillary Refill: Capillary refill takes less than 2 seconds  Coloration: Skin is not pale  Findings: No erythema  Neurological:      General: No focal deficit present        Mental Status: She is alert and oriented to person, place, and time  Psychiatric:         Mood and Affect: Mood normal          Behavior: Behavior normal          Thought Content: Thought content normal          Judgment: Judgment normal            Assessment/Plan:     Chronic Problems:  BMI 45 0-49 9, adult ([facility] )    Patient was given the option weight loss with food calorie counting, bariatric evaluation, or phentermine when her blood pressure comes down  Will refer to bariatric  Essential hypertension  Will start amlodipine 5 mg daily and have pt check the bp's at home, record the numbers and f/u here in 3 weeks with numbers from home and bp machine  No added salt in the diet  Hordeolum externum of right upper eyelid    Advised warm soaks as tolerated to the right eye  Use the Ilotycin eye drops once daily at night in the lower lid  If the area does not resolve we may have to send you out to be evaluated for a chalazion Karrie Nissen bmi  Visit Diagnosis:  Diagnoses and all orders for this visit:    Physical exam  Comments: Will get fasting labs and was advised to drink water prior to the test and do not go if she has her menses  I will follow in 3 weeks  BMI 45 0-49 9, adult ([facility])  -     TSH, 3rd generation with Free T4 reflex; Future  -     Ambulatory referral to Bariatric Surgery; Future    Essential hypertension  -     Microalbumin / creatinine urine ratio  -     Urinalysis with microscopic  -     Discontinue: amLODIPine (NORVASC) 5 mg tablet; Take 1 tablet (5 mg total) by mouth daily    Hordeolum externum of right upper eyelid  -     erythromycin (ILOTYCIN) ophthalmic ointment; Administer 0 5 inches to the right eye daily at bedtime (Patient not taking: Reported on 6/8/2021)    Screening, lipid  -     Comprehensive metabolic panel; Future  -     Lipid panel; Future    Routine gynecological examination  -     Ambulatory referral to Obstetrics / Gynecology;  Future    Screening for malignant neoplasm of colon  -     Occult Blood, Fecal Immunochemical; Future        Patient Instructions    Reviewed all with patient  Blood pressure today is very elevated  Will restart amlodipine and have her check blood pressures daily at home once in the morning and follow-up here with the blood pressures from home and her blood pressure machine in 3 weeks  Continue with the no added salt diet  Have your labs done fasting but drink water before the test and do not go if you still have your menses  Make your appointment with bariatric say and schedule your Pap smear  Use the eye ointment in the right lower lid before bed but the best thing you could do for that  I is to continue the warm soaks  If this diet does not resolve will be sending you out for evaluation with Ophthalmology to rule out a chalazion  Do the stool specimen at home and bring to the hospital and I will call with results  Follow up here in 3 weeks    SAGRARIO Richardson    Portions of the record may have been created with voice recognition software   Occasional wrong word or "sound a like" substitutions may have occurred due to the inherent limitations of voice recognition software   Read the chart carefully and recognize, using context, where substitutions have occurred

## 2021-04-27 NOTE — ASSESSMENT & PLAN NOTE
Advised warm soaks as tolerated to the right eye  Use the Ilotycin eye drops once daily at night in the lower lid  If the area does not resolve we may have to send you out to be evaluated for a chalazion

## 2021-04-27 NOTE — PATIENT INSTRUCTIONS
Reviewed all with patient  Blood pressure today is very elevated  Will restart amlodipine and have her check blood pressures daily at home once in the morning and follow-up here with the blood pressures from home and her blood pressure machine in 3 weeks  Continue with the no added salt diet  Have your labs done fasting but drink water before the test and do not go if you still have your menses  Make your appointment with bariatric say and schedule your Pap smear  Use the eye ointment in the right lower lid before bed but the best thing you could do for that  I is to continue the warm soaks  If this diet does not resolve will be sending you out for evaluation with Ophthalmology to rule out a chalazion  Do the stool specimen at home and bring to the hospital and I will call with results

## 2021-04-27 NOTE — ASSESSMENT & PLAN NOTE
Will start amlodipine 5 mg daily and have pt check the bp's at home, record the numbers and f/u here in 3 weeks with numbers from home and bp machine  No added salt in the diet

## 2021-04-30 ENCOUNTER — APPOINTMENT (OUTPATIENT)
Dept: LAB | Facility: HOSPITAL | Age: 36
End: 2021-04-30
Payer: COMMERCIAL

## 2021-04-30 DIAGNOSIS — Z13.220 SCREENING, LIPID: ICD-10-CM

## 2021-04-30 LAB
ALBUMIN SERPL BCP-MCNC: 3.4 G/DL (ref 3.5–5)
ALP SERPL-CCNC: 83 U/L (ref 46–116)
ALT SERPL W P-5'-P-CCNC: 34 U/L (ref 12–78)
ANION GAP SERPL CALCULATED.3IONS-SCNC: 7 MMOL/L (ref 4–13)
AST SERPL W P-5'-P-CCNC: 19 U/L (ref 5–45)
BACTERIA UR QL AUTO: ABNORMAL /HPF
BILIRUB SERPL-MCNC: 0.59 MG/DL (ref 0.2–1)
BILIRUB UR QL STRIP: NEGATIVE
BUN SERPL-MCNC: 12 MG/DL (ref 5–25)
CALCIUM ALBUM COR SERPL-MCNC: 9 MG/DL (ref 8.3–10.1)
CALCIUM SERPL-MCNC: 8.5 MG/DL (ref 8.3–10.1)
CHLORIDE SERPL-SCNC: 103 MMOL/L (ref 100–108)
CHOLEST SERPL-MCNC: 201 MG/DL (ref 50–200)
CLARITY UR: CLEAR
CO2 SERPL-SCNC: 30 MMOL/L (ref 21–32)
COLOR UR: YELLOW
CREAT SERPL-MCNC: 1.02 MG/DL (ref 0.6–1.3)
CREAT UR-MCNC: 224 MG/DL
GFR SERPL CREATININE-BSD FRML MDRD: 82 ML/MIN/1.73SQ M
GLUCOSE P FAST SERPL-MCNC: 109 MG/DL (ref 65–99)
GLUCOSE UR STRIP-MCNC: NEGATIVE MG/DL
HDLC SERPL-MCNC: 57 MG/DL
HGB UR QL STRIP.AUTO: NEGATIVE
KETONES UR STRIP-MCNC: NEGATIVE MG/DL
LDLC SERPL CALC-MCNC: 128 MG/DL (ref 0–100)
LEUKOCYTE ESTERASE UR QL STRIP: NEGATIVE
MICROALBUMIN UR-MCNC: 10.4 MG/L (ref 0–20)
MICROALBUMIN/CREAT 24H UR: 5 MG/G CREATININE (ref 0–30)
NITRITE UR QL STRIP: NEGATIVE
NON-SQ EPI CELLS URNS QL MICRO: ABNORMAL /HPF
NONHDLC SERPL-MCNC: 144 MG/DL
PH UR STRIP.AUTO: 7 [PH]
POTASSIUM SERPL-SCNC: 4.2 MMOL/L (ref 3.5–5.3)
PROT SERPL-MCNC: 7.8 G/DL (ref 6.4–8.2)
PROT UR STRIP-MCNC: NEGATIVE MG/DL
RBC #/AREA URNS AUTO: ABNORMAL /HPF
SODIUM SERPL-SCNC: 140 MMOL/L (ref 136–145)
SP GR UR STRIP.AUTO: 1.02 (ref 1–1.03)
TRIGL SERPL-MCNC: 81 MG/DL
TSH SERPL DL<=0.05 MIU/L-ACNC: 0.97 UIU/ML (ref 0.36–3.74)
UROBILINOGEN UR QL STRIP.AUTO: 0.2 E.U./DL
WBC #/AREA URNS AUTO: ABNORMAL /HPF

## 2021-04-30 PROCEDURE — 82043 UR ALBUMIN QUANTITATIVE: CPT | Performed by: FAMILY MEDICINE

## 2021-04-30 PROCEDURE — 81001 URINALYSIS AUTO W/SCOPE: CPT | Performed by: FAMILY MEDICINE

## 2021-04-30 PROCEDURE — 80053 COMPREHEN METABOLIC PANEL: CPT

## 2021-04-30 PROCEDURE — 84443 ASSAY THYROID STIM HORMONE: CPT

## 2021-04-30 PROCEDURE — 80061 LIPID PANEL: CPT

## 2021-04-30 PROCEDURE — 82570 ASSAY OF URINE CREATININE: CPT | Performed by: FAMILY MEDICINE

## 2021-04-30 PROCEDURE — 36415 COLL VENOUS BLD VENIPUNCTURE: CPT

## 2021-05-04 ENCOUNTER — APPOINTMENT (OUTPATIENT)
Dept: LAB | Facility: HOSPITAL | Age: 36
End: 2021-05-04
Payer: COMMERCIAL

## 2021-05-04 DIAGNOSIS — Z12.11 SCREENING FOR MALIGNANT NEOPLASM OF COLON: ICD-10-CM

## 2021-05-04 LAB — HEMOCCULT STL QL IA: NEGATIVE

## 2021-05-04 PROCEDURE — G0328 FECAL BLOOD SCRN IMMUNOASSAY: HCPCS

## 2021-05-13 ENCOUNTER — TELEPHONE (OUTPATIENT)
Dept: OBGYN CLINIC | Facility: CLINIC | Age: 36
End: 2021-05-13

## 2021-05-22 DIAGNOSIS — I10 ESSENTIAL HYPERTENSION: ICD-10-CM

## 2021-05-24 RX ORDER — AMLODIPINE BESYLATE 5 MG/1
TABLET ORAL
Qty: 30 TABLET | Refills: 3 | Status: SHIPPED | OUTPATIENT
Start: 2021-05-24 | End: 2021-06-28 | Stop reason: SDUPTHER

## 2021-06-08 ENCOUNTER — ANNUAL EXAM (OUTPATIENT)
Dept: OBGYN CLINIC | Facility: CLINIC | Age: 36
End: 2021-06-08
Payer: COMMERCIAL

## 2021-06-08 VITALS
BODY MASS INDEX: 46.61 KG/M2 | SYSTOLIC BLOOD PRESSURE: 128 MMHG | WEIGHT: 273 LBS | DIASTOLIC BLOOD PRESSURE: 88 MMHG | HEIGHT: 64 IN

## 2021-06-08 DIAGNOSIS — Z01.411 ENCOUNTER FOR GYNECOLOGICAL EXAMINATION WITH ABNORMAL FINDING: Primary | ICD-10-CM

## 2021-06-08 DIAGNOSIS — Z11.3 SCREENING FOR STD (SEXUALLY TRANSMITTED DISEASE): ICD-10-CM

## 2021-06-08 DIAGNOSIS — Z80.3 FAMILY HISTORY OF BREAST CANCER IN FIRST DEGREE RELATIVE: ICD-10-CM

## 2021-06-08 DIAGNOSIS — N76.0 ACUTE VAGINITIS: ICD-10-CM

## 2021-06-08 DIAGNOSIS — Z30.431 SURVEILLANCE OF INTRAUTERINE CONTRACEPTION: ICD-10-CM

## 2021-06-08 PROBLEM — Z30.011 OCP (ORAL CONTRACEPTIVE PILLS) INITIATION: Status: RESOLVED | Noted: 2019-03-27 | Resolved: 2021-06-08

## 2021-06-08 PROBLEM — Z30.09 BIRTH CONTROL COUNSELING: Status: RESOLVED | Noted: 2019-03-27 | Resolved: 2021-06-08

## 2021-06-08 PROBLEM — Z30.430 ENCOUNTER FOR IUD INSERTION: Status: RESOLVED | Noted: 2019-04-10 | Resolved: 2021-06-08

## 2021-06-08 PROBLEM — N94.10 DYSPAREUNIA IN FEMALE: Status: RESOLVED | Noted: 2018-01-02 | Resolved: 2021-06-08

## 2021-06-08 PROCEDURE — 87591 N.GONORRHOEAE DNA AMP PROB: CPT | Performed by: NURSE PRACTITIONER

## 2021-06-08 PROCEDURE — 87491 CHLMYD TRACH DNA AMP PROBE: CPT | Performed by: NURSE PRACTITIONER

## 2021-06-08 PROCEDURE — 87624 HPV HI-RISK TYP POOLED RSLT: CPT | Performed by: NURSE PRACTITIONER

## 2021-06-08 PROCEDURE — 87661 TRICHOMONAS VAGINALIS AMPLIF: CPT | Performed by: NURSE PRACTITIONER

## 2021-06-08 PROCEDURE — 87210 SMEAR WET MOUNT SALINE/INK: CPT | Performed by: NURSE PRACTITIONER

## 2021-06-08 PROCEDURE — 99395 PREV VISIT EST AGE 18-39: CPT | Performed by: NURSE PRACTITIONER

## 2021-06-08 PROCEDURE — G0145 SCR C/V CYTO,THINLAYER,RESCR: HCPCS | Performed by: NURSE PRACTITIONER

## 2021-06-08 RX ORDER — METRONIDAZOLE 7.5 MG/G
1 GEL VAGINAL
Qty: 45 G | Refills: 0 | Status: SHIPPED | OUTPATIENT
Start: 2021-06-08 | End: 2021-06-14

## 2021-06-08 RX ORDER — FLUCONAZOLE 150 MG/1
150 TABLET ORAL ONCE
Qty: 2 TABLET | Refills: 0 | Status: SHIPPED | OUTPATIENT
Start: 2021-06-08 | End: 2021-06-08

## 2021-06-08 NOTE — PATIENT INSTRUCTIONS
Vaginal Discharge   WHAT YOU NEED TO KNOW:   Vaginal discharge is normal  It is usually clear or white and odorless  Vaginal discharge is your body's way of cleaning your vagina so it is healthy  Irritation, itching, burning, or a change in the amount, smell, or color may indicate a problem  DISCHARGE INSTRUCTIONS:   Contact your healthcare provider or gynecologist if:   · You have swelling, burning, itching, or irritation in or around your vagina  · You have an increase in the amount of discharge  · The color or smell of your discharge changes  · Your discharge looks similar to cottage cheese  · Your discharge is bloody and it is not your monthly period  · You have pain during sexual intercourse  · You have trouble urinating, or you urinate often and with urgency  · You have abdominal pain or cramps  · You have a fever or chills  · You have low back pain or side pain  · You have questions or concerns about your condition or care  Keep your vagina healthy:   · Always wipe from front to back  after you use the toilet  This prevents spreading bacteria from your rectal area into your vagina  · Clean in and around your vagina with mild soap and warm water each day  Gently dry the area after washing  Do not use hot tubs  The heat and moisture from hot tubs can increase your risk for another yeast infection  · Do not wear tight-fitting clothes or undergarments  for long periods  Wear cotton underwear during the day  Cotton helps keep your genital area dry and does not hold in warmth or moisture  Do not wear underwear at night  · Change your laundry soap or fabric softener  if you think it is irritating your skin  · Do not douche  or use feminine hygiene sprays or bubble bath  Do not use pads or tampons that are scented, or colored or perfumed toilet paper  · Ask your healthcare provider about birth control options if necessary    Condoms have latex and diaphragms have gel that kill sperm  Both of these may irritate your genital area  Follow up with your healthcare provider as directed:  Write down your questions so you remember to ask them during your visits  © Copyright [address] Information is for End User's use only and may not be sold, redistributed or otherwise used for commercial purposes  All illustrations and images included in CareNotes® are the copyrighted property of A D A M , Inc  or [facility] Adrian Darden   The above information is an  only  It is not intended as medical advice for individual conditions or treatments  Talk to your doctor, nurse or pharmacist before following any medical regimen to see if it is safe and effective for you

## 2021-06-08 NOTE — PROGRESS NOTES
Diagnoses and all orders for this visit:    Encounter for gynecological examination with abnormal finding  -     Liquid-based pap, screening    Surveillance of intrauterine contraception    Family history of breast cancer in first degree relative  -     Mammo screening bilateral w 3d & cad; Future    Acute vaginitis  -     metroNIDAZOLE (METROGEL) 0 75 % vaginal gel; Insert 1 application into the vagina daily at bedtime for 5 days  -     fluconazole (DIFLUCAN) 150 mg tablet; Take 1 tablet (150 mg total) by mouth once for 1 dose Once and repeat in 3 days  -     POCT wet mount  -     Trichomonas Vaginalis, GRECIA    Screening for STD (sexually transmitted disease)  -     Chlamydia/GC amplified DNA by PCR  -     Trichomonas Vaginalis, GRECIA          Calcium/vit d inclusion in the diet discussed, call with any issues, SBE reinforced, all concerns addressed  Pleasant 28 y o  premenopausal female here for annual exam  She denies any issues with bleeding or her menses  Reports regular cycles with her Mirena placed 2019  Denies history of abnormal pap smears  Last Pap and HPV tests were done on 2016, pap/hpv done today  She has vaginal issues again with odor and discharge, denies itching  Hx of GDM, last fasting glucose was 109  Denies pelvic pain  Denies dyspareunia  Denies any issues with her BCM  Sexually active without any concerns  Monogamous x years  Fam Hx breast cancer with her mom at age 37, passed away at 62      Past Medical History:   Diagnosis Date    Asthma     childhood    Diabetes mellitus ([address] )     gestational    Elective      Resolved 2016     Hypertension     Migraine     Varicella     childhood     Past Surgical History:   Procedure Laterality Date    INDUCED        Family History   Problem Relation Age of Onset    Cancer Mother     Heart disease Mother     Stroke Mother     Other Mother     Hypertension Mother    Clifm Osmany Breast cancer Mother     Diabetes Father  Asthma Sister     Asthma Brother     Diabetes Paternal Grandmother     Asthma Brother     Asthma Brother     Asthma Sister     Asthma Sister      Social History     Tobacco Use    Smoking status: Never Smoker    Smokeless tobacco: Never Used   Substance Use Topics    Alcohol use: No    Drug use: No       Current Outpatient Medications:     amLODIPine (NORVASC) 5 mg tablet, TAKE 1 TABLET BY MOUTH EVERY DAY, Disp: 30 tablet, Rfl: 3    levonorgestrel (MIRENA) 20 MCG/24HR IUD, 1 each by Intrauterine route once, Disp: , Rfl:     erythromycin (ILOTYCIN) ophthalmic ointment, Administer 0 5 inches to the right eye daily at bedtime (Patient not taking: Reported on 2021), Disp: 3 5 g, Rfl: 0    fluconazole (DIFLUCAN) 150 mg tablet, Take 1 tablet (150 mg total) by mouth once for 1 dose Once and repeat in 3 days, Disp: 2 tablet, Rfl: 0    metroNIDAZOLE (METROGEL) 0 75 % vaginal gel, Insert 1 application into the vagina daily at bedtime for 5 days, Disp: 45 g, Rfl: 0  Patient Active Problem List    Diagnosis Date Noted    Hordeolum externum of right upper eyelid 2021    Essential hypertension 2019    BMI 45 0-49 9, adult ([facility] [address] ) 2019    H/O gestational diabetes mellitus, not currently pregnant 2019    Migraine 05/10/2019    Menorrhagia with regular cycle 2018       No Known Allergies    OB History    Para Term  AB Living   5 2 2   3 2   SAB TAB Ectopic Multiple Live Births         0 2      # Outcome Date GA Lbr Jay Jay/2nd Weight Sex Delivery Anes PTL Lv   5 Term 10/21/16 38w6d 07:10  00:08 3500 g (7 lb 11 5 oz) F Vag-Spont EPI N JENNY   4 Term 01/13/15 38w4d  3459 g (7 lb 10 oz) M Vag-Spont EPI  JNENY   3 AB            2 AB            1 AB                Vitals:    21 1328   BP: 128/88   BP Location: Right leg   Patient Position: Sitting   Cuff Size: Large   Weight: 124 kg (273 lb)   Height: 5' 4" (1 626 m)     Body mass index is 46 86 kg/m²      Review of Systems   Constitutional: Negative for chills, fatigue, fever and unexpected weight change  Respiratory: Negative for shortness of breath  Gastrointestinal: Negative for anal bleeding, blood in stool, constipation and diarrhea  Genitourinary: Negative for difficulty urinating, dysuria and hematuria  Physical Exam   Constitutional: She appears well-developed and well-nourished  No distress  HENT: atraumatic  Head: Normocephalic  Neck: Normal range of motion  Neck supple  Pulmonary: Effort normal   Breasts: bilateral without masses, skin changes or nipple discharge  Bilaterally soft and warm to touch  No areas of erythema or pain  Abdominal: Soft  Pelvic exam was performed with patient supine  No labial fusion  There is no rash, tenderness, lesion or injury on the right labia  There is no rash, tenderness, lesion or injury on the left labia  Urethral meatus does not show any tenderness, inflammation or discharge  Palpation of midline bladder without pain or discomfort  Uterus is not deviated, not enlarged, not fixed and not tender  Cervix exhibits no motion tenderness, no discharge and no friability  Right adnexum displays no mass, no tenderness and no fullness  Left adnexum displays no mass, no tenderness and no fullness  No erythema or tenderness in the vagina  No foreign body in the vagina  No signs of injury around the vagina  No vaginal discharge found  No signs of injury around the vagina or anus  Perineum without lesions, signs of injury, erythema or swelling  Lymphadenopathy:        Right: No inguinal adenopathy present  Left: No inguinal adenopathy present

## 2021-06-10 LAB
HPV HR 12 DNA CVX QL NAA+PROBE: NEGATIVE
HPV16 DNA CVX QL NAA+PROBE: NEGATIVE
HPV18 DNA CVX QL NAA+PROBE: NEGATIVE

## 2021-06-11 ENCOUNTER — TELEPHONE (OUTPATIENT)
Dept: OBGYN CLINIC | Facility: CLINIC | Age: 36
End: 2021-06-11

## 2021-06-11 DIAGNOSIS — A54.9 GONORRHEA: Primary | ICD-10-CM

## 2021-06-11 LAB
C TRACH DNA SPEC QL NAA+PROBE: NEGATIVE
N GONORRHOEA DNA SPEC QL NAA+PROBE: POSITIVE
T VAGINALIS RRNA SPEC QL NAA+PROBE: NEGATIVE

## 2021-06-11 RX ORDER — CEFTRIAXONE SODIUM 250 MG/1
250 INJECTION, POWDER, FOR SOLUTION INTRAMUSCULAR; INTRAVENOUS ONCE
Status: DISCONTINUED | OUTPATIENT
Start: 2021-06-11 | End: 2021-06-28

## 2021-06-11 RX ORDER — AZITHROMYCIN 500 MG/1
1000 TABLET, FILM COATED ORAL DAILY
Qty: 2 TABLET | Refills: 0 | Status: SHIPPED | OUTPATIENT
Start: 2021-06-11 | End: 2021-06-12

## 2021-06-11 NOTE — TELEPHONE ENCOUNTER
----- Message from Carolina Parisi, [address] sent at 6/11/2021 12:08 PM EDT -----  I am out of the office but could you pls inform Bernard Encarnacion her Gonorrhea test came back positive and arrange for a ceftriaxone shot which I will place the order for  She will need to notify her partner  Her chlamydia and HPV was negative and I am waiting for her pap to return   Thank you

## 2021-06-14 ENCOUNTER — OFFICE VISIT (OUTPATIENT)
Dept: OBGYN CLINIC | Facility: MEDICAL CENTER | Age: 36
End: 2021-06-14
Payer: COMMERCIAL

## 2021-06-14 VITALS — SYSTOLIC BLOOD PRESSURE: 156 MMHG | BODY MASS INDEX: 47.03 KG/M2 | WEIGHT: 274 LBS | DIASTOLIC BLOOD PRESSURE: 100 MMHG

## 2021-06-14 DIAGNOSIS — A54.9 GONORRHEA: Primary | ICD-10-CM

## 2021-06-14 LAB
LAB AP GYN PRIMARY INTERPRETATION: NORMAL
Lab: NORMAL
PATH INTERP SPEC-IMP: NORMAL

## 2021-06-14 PROCEDURE — 96372 THER/PROPH/DIAG INJ SC/IM: CPT | Performed by: NURSE PRACTITIONER

## 2021-06-14 RX ORDER — CEFTRIAXONE SODIUM 250 MG/1
250 INJECTION, POWDER, FOR SOLUTION INTRAMUSCULAR; INTRAVENOUS ONCE
Status: COMPLETED | OUTPATIENT
Start: 2021-06-14 | End: 2021-06-14

## 2021-06-14 RX ORDER — FLUCONAZOLE 150 MG/1
TABLET ORAL
COMMUNITY
Start: 2021-06-08 | End: 2021-06-28 | Stop reason: ALTCHOICE

## 2021-06-14 RX ADMIN — CEFTRIAXONE SODIUM 250 MG: 250 INJECTION, POWDER, FOR SOLUTION INTRAMUSCULAR; INTRAVENOUS at 12:01

## 2021-06-14 NOTE — PROGRESS NOTES
Patient here for injection of Rocephin  Injection given in right buttock  Tolerated well  NDC# 9226-5408-14  EXP - June 2022

## 2021-06-16 ENCOUNTER — OFFICE VISIT (OUTPATIENT)
Dept: BARIATRICS | Facility: CLINIC | Age: 36
End: 2021-06-16
Payer: COMMERCIAL

## 2021-06-16 VITALS
WEIGHT: 273 LBS | HEIGHT: 67 IN | BODY MASS INDEX: 42.85 KG/M2 | DIASTOLIC BLOOD PRESSURE: 86 MMHG | SYSTOLIC BLOOD PRESSURE: 128 MMHG | HEART RATE: 97 BPM | TEMPERATURE: 98.5 F

## 2021-06-16 DIAGNOSIS — I10 ESSENTIAL HYPERTENSION: ICD-10-CM

## 2021-06-16 DIAGNOSIS — E66.01 MORBID OBESITY (HCC): Primary | ICD-10-CM

## 2021-06-16 DIAGNOSIS — Z91.89 AT RISK FOR OBSTRUCTIVE SLEEP APNEA: ICD-10-CM

## 2021-06-16 DIAGNOSIS — R63.5 ABNORMAL WEIGHT GAIN: ICD-10-CM

## 2021-06-16 DIAGNOSIS — E78.2 MIXED HYPERLIPIDEMIA: ICD-10-CM

## 2021-06-16 DIAGNOSIS — G43.809 OTHER MIGRAINE WITHOUT STATUS MIGRAINOSUS, NOT INTRACTABLE: ICD-10-CM

## 2021-06-16 LAB — SL AMB POCT URINE HCG: NEGATIVE

## 2021-06-16 PROCEDURE — 99244 OFF/OP CNSLTJ NEW/EST MOD 40: CPT | Performed by: INTERNAL MEDICINE

## 2021-06-16 PROCEDURE — 3008F BODY MASS INDEX DOCD: CPT | Performed by: INTERNAL MEDICINE

## 2021-06-16 PROCEDURE — 1036F TOBACCO NON-USER: CPT | Performed by: INTERNAL MEDICINE

## 2021-06-16 PROCEDURE — 81025 URINE PREGNANCY TEST: CPT | Performed by: INTERNAL MEDICINE

## 2021-06-16 RX ORDER — TOPIRAMATE 50 MG/1
TABLET, FILM COATED ORAL
Qty: 30 TABLET | Refills: 1 | Status: SHIPPED | OUTPATIENT
Start: 2021-06-16 | End: 2021-07-12

## 2021-06-16 NOTE — PROGRESS NOTES
Assessment/Plan:  Angus Nevarez was seen today for consult  Diagnoses and all orders for this visit:      Essential hypertension  Weight loss and increasing activity level should help  Limit sodium intake <2500mg per day    Other migraine without status migrainosus, not intractable  - topiramate (TOPAMAX) 50 MG tablet; Take half tablet at night for 1 week, then take 1 tablet at night  Dispense: 30 tablet; Refill: 1    Mixed hyperlipidemia  Increase activity level to 150 minutes of moderate to intense exercise per week  Weight loss should help improve the lipid levels    At risk for obstructive sleep apnea  Morbid obesity (HCC)  Abnormal weight gain  -     Ambulatory referral to Sleep Medicine; Future  -     ECG 12 lead; Future  -      POCT urine HCG: negative  -     Ambulatory referral to Sleep Medicine; Future   - Discussed options of HealthyCORE-Intensive Lifestyle Intervention Program, Very Low Calorie Diet-VLCD, Conservative Program, Zhen-En-Y Gastric Bypass and Vertical Sleeve Gastrectomy and the role of weight loss medications  - Explained the importance of making lifestyle changes first before starting any anti-obesity medications  Patient should demonstrate lifestyle changes first before anti-obesity medication can be initiated  - Patient is interested in pursuing Conservative Program  - Initial weight loss goal of 5-10% weight loss for improved health  - Screening labs reviewed  Can start with topiramate today   Pt has a Mirena, negative pregnancy test  Topamax consent form signed and scanned into her chart  Previously was on it for migraine- had dizziness at 50mg but also was taking HCTZ at the same time  Advised to take 1/2 tab if she experiences dizziness  Pt to message me on her progress in about 4 weeks and will start phentermine then  EKG ordered  Pt to consider bariatric surgery if no significant weight loss with MWM    Goals:  Do not skip any meals!   Food log (ie ) www myfitnesspal com,sparkpeople  com,loseit com,calorieking  com,etc  baritastic (use skinnytaste  com or smartphone vivian Ki[employer].me for recipes)  No sugary beverages  At least 64oz of water daily  Increase physical activity by 10 minutes daily  Gradually increase physical activity to a goal of 5 days per week for 30 minutes of MODERATE intensity PLUS 2 days per week of FULL BODY resistance training (use smartphone apps FitON, Home Workout, etc )  Goal protein 100g per day  1500 calories     45 minute visit, >50% face-to-face time spent counseling patient on surgical and nonsurgical interventions for the treatment of excess weight  Discussed the advantages and long-term outcomes with regards to bariatric surgery  Discussed in detail nonsurgical options including intensive lifestyle intervention program, very low-calorie diet program and conservative program   Discussed the role of weight loss medications  Counseled patient on diet behavior and exercise modification for weight loss  Follow up in approximately 2 months with Non-Surgical Physician/Advanced Practitioner  Subjective:   Chief Complaint   Patient presents with    Consult     Patient is here for initial MWM consult with Dr Laury Granados  BMI 43 08  Positive stop bang ()  Patient ID: Vincent Nevarez  is a 28 y o  female with excess weight/obesity here to pursue weight management      Past Medical History:   Diagnosis Date    Asthma     childhood    Diabetes mellitus ([facility] [address] )     gestational    Elective      Resolved 2016     Hypertension     Migraine     Morbid obesity with BMI of 45 0-49 9, adult ([facility] )     Varicella     childhood     Past Surgical History:   Procedure Laterality Date    INDUCED          HPI:  Wt Readings from Last 20 Encounters:   21 124 kg (273 lb)   21 124 kg (274 lb)   21 124 kg (273 lb)   21 121 kg (267 lb)   20 119 kg (262 lb)   20 120 kg (264 lb) 01/10/20 119 kg (263 lb 6 4 oz)   12/20/19 121 kg (266 lb)   10/15/19 119 kg (262 lb 3 2 oz)   06/07/19 114 kg (251 lb)   05/18/19 112 kg (248 lb)   05/10/19 117 kg (258 lb)   04/10/19 115 kg (253 lb)   03/27/19 116 kg (255 lb)   01/10/19 117 kg (257 lb 6 oz)   08/24/18 110 kg (242 lb)   07/31/18 111 kg (245 lb)   06/05/18 110 kg (242 lb)   04/27/18 111 kg (244 lb)   02/14/18 113 kg (250 lb)       Severity: Severe  Onset: 70 lbs since 6 years ago when she was pregnant, also had gestational diabetes   Modifiers: Diet and Exercise  Contributing factors: Poor Food Choices and Insufficient Physical Activity  Associated symptoms: comorbid conditions  Goal weight: 180    B- herbal life shake 98 víctor 13g protein  S-  L- pasta   S-  D- turkey burger without the bun   S-    Hydration: 1 gal water  Alcohol: no   Smoking: no  Exercise: gym 3-4x wk; elliptical, treadmill, stair master 1-1 5 hour   Sleep: 3-4 hours; keeps waking up in the night   STOP bang: +5/8    The following portions of the patient's history were reviewed and updated as appropriate: allergies, current medications, past family history, past medical history, past social history, past surgical history, and problem list     Review of Systems   Constitutional: Negative for appetite change, chills and fever  HENT: Negative for rhinorrhea and sore throat  Respiratory: Negative for cough, chest tightness and shortness of breath  Cardiovascular: Negative for chest pain and leg swelling  Gastrointestinal: Negative for abdominal pain, constipation, diarrhea, nausea and vomiting  Endocrine: Negative for cold intolerance and heat intolerance  Genitourinary: Negative for difficulty urinating  Musculoskeletal: Negative for arthralgias  Skin: Negative for color change  Neurological: Negative for dizziness, numbness and headaches  Psychiatric/Behavioral: Positive for sleep disturbance  The patient is not nervous/anxious      All other systems reviewed and are negative  Objective:  /86 (BP Location: Left arm, Patient Position: Sitting, Cuff Size: Large)   Pulse 97   Temp 98 5 °F (36 9 °C) (Tympanic)   Ht 5' 6 75" (1 695 m)   Wt 124 kg (273 lb)   LMP 05/21/2021 (Exact Date)   BMI 43 08 kg/m²   Constitutional: Well-developed, well-nourished and obese Body mass index is 43 08 kg/m²  [facility] HEENT: No conjunctival pallor or jaundice  Pulmonary: No increased work of breathing or signs of respiratory distress  CV: Normal rate, well-perfused  GI: Obese  Non-distended  MSK: No edema   Neuro: Oriented to person, place and time  Normal Speech  Normal gait  Psych: Normal affect and mood       Labs and Imaging  Lab Results   Component Value Date    WBC 8 61 10/22/2016    HGB 11 6 10/22/2016    HCT 36 9 10/22/2016    MCV 82 10/22/2016     10/22/2016     Lab Results   Component Value Date     10/02/2014    SODIUM 140 04/30/2021    K 4 2 04/30/2021     04/30/2021    CO2 30 04/30/2021    ANIONGAP 10 10/02/2014    AGAP 7 04/30/2021    BUN 12 04/30/2021    CREATININE 1 02 04/30/2021    GLUF 109 (H) 04/30/2021    CALCIUM 8 5 04/30/2021    AST 19 04/30/2021    ALT 34 04/30/2021    ALKPHOS 83 04/30/2021    PROT 7 0 10/02/2014    TP 7 8 04/30/2021    BILITOT 0 17 (L) 10/02/2014    TBILI 0 59 04/30/2021    EGFR 82 04/30/2021     Lab Results   Component Value Date    HGBA1C  05/10/2019      Comment:      5 6     Lab Results   Component Value Date    CLK6BMKPSCFK 0 970 04/30/2021     Lab Results   Component Value Date    CHOLESTEROL 201 (H) 04/30/2021     Lab Results   Component Value Date    HDL 57 04/30/2021     Lab Results   Component Value Date    TRIG 81 04/30/2021     No results found for: LDLDIRECT

## 2021-06-16 NOTE — PATIENT INSTRUCTIONS
Goals:  Do not skip any meals! Food log (ie ) www myfitnesspal com,sparkpeople  com,loseit com,calorieking  com,etc  baritastic (use skinnytaste  com or smartphone vivian [employer] for recipes)  No sugary beverages  At least 64oz of water daily  Increase physical activity by 10 minutes daily   Gradually increase physical activity to a goal of 5 days per week for 30 minutes of MODERATE intensity PLUS 2 days per week of FULL BODY resistance training (use smartphone apps [employer], Home Workout, etc )  Goal protein 100g per day  1500 calories

## 2021-06-17 ENCOUNTER — TELEPHONE (OUTPATIENT)
Dept: BARIATRICS | Facility: CLINIC | Age: 36
End: 2021-06-17

## 2021-06-28 ENCOUNTER — OFFICE VISIT (OUTPATIENT)
Dept: FAMILY MEDICINE CLINIC | Facility: CLINIC | Age: 36
End: 2021-06-28
Payer: COMMERCIAL

## 2021-06-28 VITALS
SYSTOLIC BLOOD PRESSURE: 140 MMHG | DIASTOLIC BLOOD PRESSURE: 110 MMHG | WEIGHT: 279.4 LBS | TEMPERATURE: 97.3 F | HEART RATE: 89 BPM | BODY MASS INDEX: 43.85 KG/M2 | OXYGEN SATURATION: 99 % | HEIGHT: 67 IN | RESPIRATION RATE: 16 BRPM

## 2021-06-28 DIAGNOSIS — Z11.59 ENCOUNTER FOR HEPATITIS C SCREENING TEST FOR LOW RISK PATIENT: ICD-10-CM

## 2021-06-28 DIAGNOSIS — Z86.32 H/O GESTATIONAL DIABETES MELLITUS, NOT CURRENTLY PREGNANT: ICD-10-CM

## 2021-06-28 DIAGNOSIS — I10 ESSENTIAL HYPERTENSION: Primary | ICD-10-CM

## 2021-06-28 PROCEDURE — 99214 OFFICE O/P EST MOD 30 MIN: CPT | Performed by: FAMILY MEDICINE

## 2021-06-28 RX ORDER — LISINOPRIL 10 MG/1
10 TABLET ORAL DAILY
Qty: 30 TABLET | Refills: 0 | Status: SHIPPED | OUTPATIENT
Start: 2021-06-28 | End: 2021-07-24

## 2021-06-28 RX ORDER — AMLODIPINE BESYLATE 5 MG/1
5 TABLET ORAL DAILY
Qty: 30 TABLET | Refills: 3 | Status: SHIPPED | OUTPATIENT
Start: 2021-06-28 | End: 2022-01-06 | Stop reason: SDUPTHER

## 2021-06-28 NOTE — PATIENT INSTRUCTIONS
Reviewed all with patient  Will get labs after she starts her lisinopril at night  Please monitor the blood pressures at home record the numbers and follow-up here in about 4 weeks with blood pressures from home and the blood pressure machine  No added salt in the diet continue to work on weight loss  Keep your appointment for your mammogram   I suspect when the weight starts come in off we will be able to take the blood pressure pills down  Sign up for the COVID shot right now

## 2021-06-28 NOTE — PROGRESS NOTES
Assessment/Plan:     Chronic Problems:  Essential hypertension  Home blood pressures and blood pressure here today is too high  Continue to take your amlodipine in the morning but I a m  prescribing lisinopril at night  Have the blood test done fasting in 4 days after starting lisinopril but drink water before the test     H/O gestational diabetes mellitus, not currently pregnant  Will get hba1c and discuss at f/u     BMI 40 0-44 9, adult ([facility] )  Advised to continue to work with weight management  Visit Diagnosis:  Diagnoses and all orders for this visit:    Essential hypertension  -     Comprehensive metabolic panel; Future  -     Microalbumin / creatinine urine ratio  -     Urinalysis with microscopic  -     lisinopril (ZESTRIL) 10 mg tablet; Take 1 tablet (10 mg total) by mouth daily  -     amLODIPine (NORVASC) 5 mg tablet; Take 1 tablet (5 mg total) by mouth daily    H/O gestational diabetes mellitus, not currently pregnant  -     Hemoglobin A1C; Future    BMI 40 0-44 9, adult ([place] [address] )    Encounter for hepatitis C screening test for low risk patient  -     Hepatitis C antibody; Future          Subjective:    Patient ID: Harriet Diaz is a 28 y o  female  Pt is here for routine f/u appt  Had her first appt with bariatrics on the 16th  Now calories counting and back on topamax for the weight, not headaches  Has f/u in 1 month  Told if all is well she will be put on phentermine with the topamax and told to get an ekg done  Feels well  BP's at home run 125-130/95  Thinks her problem is stress  Mom and dad his high bp's  Mom passed away from breast cancer  Pt did not have her first mammo yet, but this is pending  Pt has no complaints today  Needs renewals on meds  Takes all other meds as directed  No side effects noted  Pt gained 12 lbs since last appt         The following portions of the patient's history were reviewed and updated as appropriate: allergies, current medications, past family history, past medical history, past social history, past surgical history and problem list     Review of Systems   Constitutional: Negative for chills, diaphoresis, fatigue and fever  HENT: Negative  Eyes: Negative  Respiratory: Negative for cough, shortness of breath and wheezing  Cardiovascular: Negative for chest pain and palpitations  Gastrointestinal: Negative  Genitourinary: Negative for dysuria, frequency and urgency  FDLMP - June 20th   Musculoskeletal: Negative  Neurological: Negative for dizziness, light-headedness and headaches  Psychiatric/Behavioral: Negative for dysphoric mood  The patient is not nervous/anxious  BP (!) 140/110   Pulse 89   Temp (!) 97 3 °F (36 3 °C)   Resp 16   Ht 5' 6 75" (1 695 m)   Wt 127 kg (279 lb 6 4 oz)   SpO2 99%   BMI 44 09 kg/m²   Social History     Socioeconomic History    Marital status: /Civil Union     Spouse name: Not on file    Number of children: Not on file    Years of education: Not on file    Highest education level: Not on file   Occupational History    Not on file   Tobacco Use    Smoking status: Never Smoker    Smokeless tobacco: Never Used   Vaping Use    Vaping Use: Never used   Substance and Sexual Activity    Alcohol use: No    Drug use: No    Sexual activity: Not Currently     Partners: Male     Birth control/protection: I U D  Comment:    Other Topics Concern    Not on file   Social History Narrative    Always uses seat belt     Social Determinants of Health     Financial Resource Strain:     Difficulty of Paying Living Expenses:    Food Insecurity:     Worried About Running Out of Food in the Last Year:     [address] in the Last Year:    Transportation Needs:     Lack of Transportation (Medical):      Lack of Transportation (Non-Medical):    Physical Activity:     Days of Exercise per Week:     Minutes of Exercise per Session:    Stress:     Feeling of Stress : Social Connections:     Frequency of Communication with Friends and Family:     Frequency of Social Gatherings with Friends and Family:     Attends Yazdan[employer] Services:     Active Member of Clubs or Organizations:     Attends Club or Organization Meetings:     Marital Status:    Intimate Partner Violence:     Fear of Current or Ex-Partner:     Emotionally Abused:     Physically Abused:     Sexually Abused:      Past Medical History:   Diagnosis Date    Asthma     childhood    Diabetes mellitus ([facility] )     gestational    Elective      Resolved 2016     H/O gestational diabetes mellitus, not currently pregnant 2019    Hypertension     Migraine     Morbid obesity with BMI of 45 0-49 9, adult ([facility] )     Varicella     childhood     Family History   Problem Relation Age of Onset    Cancer Mother     Heart disease Mother     Stroke Mother     Other Mother     Hypertension Mother    Leon Garcia Breast cancer Mother     Diabetes Father     Asthma Sister     Asthma Brother     Diabetes Paternal Grandmother     Asthma Brother     Asthma Brother     Asthma Sister     Asthma Sister      Past Surgical History:   Procedure Laterality Date    INDUCED          Current Outpatient Medications:     amLODIPine (NORVASC) 5 mg tablet, Take 1 tablet (5 mg total) by mouth daily, Disp: 30 tablet, Rfl: 3    levonorgestrel (MIRENA) 20 MCG/24HR IUD, 1 each by Intrauterine route once, Disp: , Rfl:     topiramate (TOPAMAX) 50 MG tablet, Take half tablet at night for 1 week, then take 1 tablet at night, Disp: 30 tablet, Rfl: 1    lisinopril (ZESTRIL) 10 mg tablet, Take 1 tablet (10 mg total) by mouth daily, Disp: 30 tablet, Rfl: 0  No current facility-administered medications for this visit      No Known Allergies       Lab Review   Office Visit on 2021   Component Date Value    URINE HCG 2021 negative    Annual Exam on 2021   Component Date Value    N gonorrhoeae, DNA Probe 06/08/2021 Positive*    Chlamydia trachomatis, D* 06/08/2021 Negative     WET MOUNT 06/08/2021      Trichomonas vaginosis 06/08/2021 Negative     Case Report 06/08/2021                      Value:Gynecologic Cytology Report                       Case: Albert Cole Provider:  SAGRARIO Putnam       Collected:           06/08/2021 1426              Ordering Location:     [facility]     Received:            06/08/2021 [address]                                     [facility]                                                                               [place]                                                                       First Screen:          Cici Akhtar                                                               Rescreen:              Marry Henderson, [place]                                                       Specimen:    LIQUID-BASED PAP, SCREENING, Cervix                                                        Primary Interpretation 06/08/2021 Negative for intraepithelial lesion or malignancy     Interpretation 06/08/2021 Shift in elpidio suggestive of bacterial vaginosis     Specimen Adequacy 06/08/2021 Satisfactory for evaluation  Endocervical/transformation zone component present   Note 06/08/2021                      Value: This result contains rich text formatting which cannot be displayed here   Additional Information 06/08/2021                      Value: This result contains rich text formatting which cannot be displayed here      HPV Other HR 06/08/2021 Negative     HPV16 06/08/2021 Negative     HPV18 06/08/2021 Negative    Appointment on 05/04/2021   Component Date Value    OCCULT BLD, FECAL IMMUNO* 05/04/2021 Negative    Appointment on 04/30/2021   Component Date Value    Sodium 04/30/2021 140     Potassium 04/30/2021 4 2     Chloride 04/30/2021 103     CO2 04/30/2021 30     ANION GAP 04/30/2021 7     BUN 04/30/2021 12     Creatinine 04/30/2021 1 02     Glucose, Fasting 04/30/2021 109*    Calcium 04/30/2021 8 5     Corrected Calcium 04/30/2021 9 0     AST 04/30/2021 19     ALT 04/30/2021 34     Alkaline Phosphatase 04/30/2021 83     Total Protein 04/30/2021 7 8     Albumin 04/30/2021 3 4*    Total Bilirubin 04/30/2021 0 59     eGFR 04/30/2021 82     Cholesterol 04/30/2021 201*    Triglycerides 04/30/2021 81     HDL, Direct 04/30/2021 57     LDL Calculated 04/30/2021 128*    Non-HDL-Chol (CHOL-HDL) 04/30/2021 144     TSH 3RD GENERATON 04/30/2021 0 970         Imaging: No results found  Objective:     Physical Exam  Vitals and nursing note reviewed  Constitutional:       General: She is not in acute distress  Appearance: Normal appearance  She is well-developed  She is not ill-appearing, toxic-appearing or diaphoretic  HENT:      Head: Normocephalic and atraumatic  Right Ear: Tympanic membrane, ear canal and external ear normal  There is no impacted cerumen  Left Ear: Tympanic membrane, ear canal and external ear normal  There is no impacted cerumen  Nose: Nose normal  No congestion  Mouth/Throat:      Mouth: Mucous membranes are moist       Pharynx: No oropharyngeal exudate  Eyes:      General:         Right eye: No discharge  Left eye: No discharge  Extraocular Movements: Extraocular movements intact  Conjunctiva/sclera: Conjunctivae normal       Pupils: Pupils are equal, round, and reactive to light  Cardiovascular:      Rate and Rhythm: Normal rate and regular rhythm  Heart sounds: No murmur heard  Pulmonary:      Effort: Pulmonary effort is normal  No respiratory distress  Breath sounds: Normal breath sounds  Abdominal:      Comments: Abdomen is morbidly obese  Musculoskeletal:         General: No deformity  Normal range of motion  Cervical back: Normal range of motion and neck supple  No rigidity        Right lower leg: No edema  Left lower leg: No edema  Skin:     General: Skin is warm  Capillary Refill: Capillary refill takes less than 2 seconds  Coloration: Skin is not pale  Findings: No erythema  Neurological:      General: No focal deficit present  Mental Status: She is alert and oriented to person, place, and time  Psychiatric:         Mood and Affect: Mood normal          Behavior: Behavior normal          Thought Content: Thought content normal          Judgment: Judgment normal            Patient Instructions     Reviewed all with patient  Will get labs after she starts her lisinopril at night  Please monitor the blood pressures at home record the numbers and follow-up here in about 4 weeks with blood pressures from home and the blood pressure machine  No added salt in the diet continue to work on weight loss  Keep your appointment for your mammogram   I suspect when the weight starts come in off we will be able to take the blood pressure pills down  Sign up for the COVID shot right now  SAGRARIO Richardson    Portions of the record may have been created with voice recognition software  Occasional wrong word or "sound a like" substitutions may have occurred due to the inherent limitations of voice recognition software  Read the chart carefully and recognize, using context, where substitutions have occurred

## 2021-06-30 ENCOUNTER — OFFICE VISIT (OUTPATIENT)
Dept: OBGYN CLINIC | Facility: CLINIC | Age: 36
End: 2021-06-30
Payer: COMMERCIAL

## 2021-06-30 VITALS
DIASTOLIC BLOOD PRESSURE: 110 MMHG | SYSTOLIC BLOOD PRESSURE: 140 MMHG | HEIGHT: 65 IN | WEIGHT: 272 LBS | BODY MASS INDEX: 45.32 KG/M2

## 2021-06-30 DIAGNOSIS — A54.9 GONORRHEA: Primary | ICD-10-CM

## 2021-06-30 DIAGNOSIS — Z11.3 SCREENING FOR STD (SEXUALLY TRANSMITTED DISEASE): ICD-10-CM

## 2021-06-30 PROCEDURE — 3080F DIAST BP >= 90 MM HG: CPT | Performed by: NURSE PRACTITIONER

## 2021-06-30 PROCEDURE — 3077F SYST BP >= 140 MM HG: CPT | Performed by: NURSE PRACTITIONER

## 2021-06-30 PROCEDURE — 1036F TOBACCO NON-USER: CPT | Performed by: NURSE PRACTITIONER

## 2021-06-30 PROCEDURE — 3008F BODY MASS INDEX DOCD: CPT | Performed by: NURSE PRACTITIONER

## 2021-06-30 PROCEDURE — 99212 OFFICE O/P EST SF 10 MIN: CPT | Performed by: NURSE PRACTITIONER

## 2021-06-30 NOTE — PATIENT INSTRUCTIONS
Gonorrhea   AMBULATORY CARE:   Gonorrhea , or gonococcal urethritis, is a sexually transmitted infection (STI) caused by bacteria  It is spread by unprotected oral, vaginal, or anal sex  Gonorrhea causes inflammation of the urethra  The urethra is the tube where urine passes from the bladder to the outside of the body  Anyone with multiple sexual partners is at higher risk for gonorrhea  Common symptoms include the following:   · Feeling like you need to urinate more frequently than usual    · Pain or burning when you urinate    · Pain in your lower abdomen, penis, or vaginal area    · Pain when you have sex    · Thick, yellow-green discharge coming from your penis or vagina    · Fever    Seek care immediately if:   · Chest pain or trouble breathing    · Pain and swelling in your scrotum if you are male    · Pain in your abdomen or joints    Contact your healthcare provider if:   · You have a fever  · You have chills, a cough, or feel weak and achy  · You have questions or concerns about your condition or care  Treatment for gonorrhea  may include antibiotics to treat the bacterial infection  Both you and your sexual partner have to be treated to prevent gonorrhea from spreading  Prevent the spread of gonorrhea:   · Use a condom  during oral, vaginal, and anal sex  Ask for more information about the correct way to use condoms  · Do not have sex with someone who has gonorrhea  This includes oral, vaginal, and anal sex  · Do not have sex while you or your partner are being treated for gonorrhea  Ask when it is safe to have sex  · Tell your healthcare provider if you are pregnant  Gonorrhea can be passed to an infant during birth  Follow up with your healthcare provider as directed:  Write down your questions so you remember to ask them during your visits     © Copyright [address] Information is for End User's use only and may not be sold, redistributed or otherwise used for commercial purposes  All illustrations and images included in CareNotes® are the copyrighted property of A D A M , Inc  or Azeem Ward  The above information is an  only  It is not intended as medical advice for individual conditions or treatments  Talk to your doctor, nurse or pharmacist before following any medical regimen to see if it is safe and effective for you

## 2021-06-30 NOTE — PROGRESS NOTES
Diagnoses and all orders for this visit:    Gonorrhea    Screening for STD (sexually transmitted disease)  -     Chlamydia/GC amplified DNA by PCR        Call prn, all questions answered, return for annual       Pleasant 28 y o  here for HAROON RUIZ after being treated for gonorrhea 2021 which is only 6 days ago  I advised she should be tested 3 weeks after treamtnet so agrees to go to the lab in July with her first morning urine  She denies any further vaginal symptoms  She is not sure if her partner was treated bc he won't provide confirmation but she has not been intimate with him      Past Medical History:   Diagnosis Date    Asthma     childhood    [facility] )     gestational    Elective      Resolved 2016     H/O gestational diabetes mellitus, not currently pregnant 2019    Hypertension     Migraine     Morbid obesity with BMI of 45 0-49 9, adult ([facility] )     Varicella     childhood     Past Surgical History:   Procedure Laterality Date    INDUCED        Social History     Tobacco Use    Smoking status: Never Smoker    Smokeless tobacco: Never Used   Vaping Use    Vaping Use: Never used   Substance Use Topics    Alcohol use: No    Drug use: No     Family History   Problem Relation Age of Onset    Cancer Mother     Heart disease Mother    [facility] Stroke Mother    [facility] Other Mother     Hypertension Mother     Breast cancer Mother     Diabetes Father     Asthma Sister     Asthma Brother     Diabetes Paternal Grandmother     Asthma Brother     Asthma Brother     Asthma Sister     Asthma Sister        Current Outpatient Medications:     amLODIPine (NORVASC) 5 mg tablet, Take 1 tablet (5 mg total) by mouth daily, Disp: 30 tablet, Rfl: 3    levonorgestrel (MIRENA) 20 MCG/24HR IUD, 1 each by Intrauterine route once, Disp: , Rfl:     lisinopril (ZESTRIL) 10 mg tablet, Take 1 tablet (10 mg total) by mouth daily, Disp: 30 tablet, Rfl: 0    topiramate (TOPAMAX) 50 MG tablet, Take half tablet at night for 1 week, then take 1 tablet at night, Disp: 30 tablet, Rfl: 1    No Known Allergies  OB History    Para Term  AB Living   5 2 2   3 2   SAB TAB Ectopic Multiple Live Births         0 2      # Outcome Date GA Lbr Jay Jay/2nd Weight Sex Delivery Anes PTL Lv   5 Term 10/21/16 38w6d 07:10 / 00:08 3500 g (7 lb 11 5 oz) F Vag-Spont EPI N JENNY   4 Term 01/13/15 38w4d  3459 g (7 lb 10 oz) M Vag-Spont EPI  JENNY   3 AB            2 AB            1 AB                Vitals:    21 1123   BP: (!) 140/110   BP Location: Left arm   Patient Position: Sitting   Cuff Size: Standard   Weight: 123 kg (272 lb)   Height: 5' 5" (1 651 m)     Body mass index is 45 26 kg/m²  Review of Systems   Constitutional: Negative for chills, fatigue, fever and unexpected weight change  Respiratory: Negative for shortness of breath  Gastrointestinal: Negative for anal bleeding, blood in stool, constipation and diarrhea  Genitourinary: Negative for difficulty urinating, dysuria and hematuria  Physical Exam   Constitutional: She appears well-developed and well-nourished  No distress  HENT: atraumatic, EOMI bilaterally  Head: Normocephalic  Neck: Normal range of motion  Neck supple  Pulmonary: Effort normal    Abdominal: Soft  Nontender    Extremities: moves all extremities well, no edema noted upper or lower  Skin: clean, dry and intact, warm to touch

## 2021-07-08 ENCOUNTER — IMMUNIZATIONS (OUTPATIENT)
Dept: FAMILY MEDICINE CLINIC | Facility: HOSPITAL | Age: 36
End: 2021-07-08

## 2021-07-08 DIAGNOSIS — Z23 ENCOUNTER FOR IMMUNIZATION: Primary | ICD-10-CM

## 2021-07-08 PROCEDURE — 0001A SARS-COV-2 / COVID-19 MRNA VACCINE (PFIZER-BIONTECH) 30 MCG: CPT

## 2021-07-08 PROCEDURE — 91300 SARS-COV-2 / COVID-19 MRNA VACCINE (PFIZER-BIONTECH) 30 MCG: CPT

## 2021-07-10 DIAGNOSIS — E66.01 MORBID OBESITY (HCC): ICD-10-CM

## 2021-07-10 DIAGNOSIS — G43.809 OTHER MIGRAINE WITHOUT STATUS MIGRAINOSUS, NOT INTRACTABLE: ICD-10-CM

## 2021-07-12 RX ORDER — TOPIRAMATE 50 MG/1
TABLET, FILM COATED ORAL
Qty: 30 TABLET | Refills: 1 | Status: SHIPPED | OUTPATIENT
Start: 2021-07-12 | End: 2021-07-27

## 2021-07-24 DIAGNOSIS — I10 ESSENTIAL HYPERTENSION: ICD-10-CM

## 2021-07-24 RX ORDER — LISINOPRIL 10 MG/1
TABLET ORAL
Qty: 30 TABLET | Refills: 0 | Status: SHIPPED | OUTPATIENT
Start: 2021-07-24 | End: 2021-08-07

## 2021-07-29 ENCOUNTER — APPOINTMENT (OUTPATIENT)
Dept: LAB | Facility: HOSPITAL | Age: 36
End: 2021-07-29
Payer: COMMERCIAL

## 2021-07-29 ENCOUNTER — IMMUNIZATIONS (OUTPATIENT)
Dept: FAMILY MEDICINE CLINIC | Facility: HOSPITAL | Age: 36
End: 2021-07-29

## 2021-07-29 DIAGNOSIS — Z86.32 H/O GESTATIONAL DIABETES MELLITUS, NOT CURRENTLY PREGNANT: ICD-10-CM

## 2021-07-29 DIAGNOSIS — Z23 ENCOUNTER FOR IMMUNIZATION: Primary | ICD-10-CM

## 2021-07-29 DIAGNOSIS — Z11.59 ENCOUNTER FOR HEPATITIS C SCREENING TEST FOR LOW RISK PATIENT: ICD-10-CM

## 2021-07-29 DIAGNOSIS — I10 ESSENTIAL HYPERTENSION: ICD-10-CM

## 2021-07-29 DIAGNOSIS — Z11.3 SCREENING FOR STD (SEXUALLY TRANSMITTED DISEASE): ICD-10-CM

## 2021-07-29 LAB
ALBUMIN SERPL BCP-MCNC: 3.6 G/DL (ref 3.5–5)
ALP SERPL-CCNC: 86 U/L (ref 46–116)
ALT SERPL W P-5'-P-CCNC: 30 U/L (ref 12–78)
ANION GAP SERPL CALCULATED.3IONS-SCNC: 6 MMOL/L (ref 4–13)
AST SERPL W P-5'-P-CCNC: 14 U/L (ref 5–45)
BACTERIA UR QL AUTO: NORMAL /HPF
BILIRUB SERPL-MCNC: 0.39 MG/DL (ref 0.2–1)
BILIRUB UR QL STRIP: NEGATIVE
BUN SERPL-MCNC: 14 MG/DL (ref 5–25)
CALCIUM SERPL-MCNC: 8.8 MG/DL (ref 8.3–10.1)
CHLORIDE SERPL-SCNC: 102 MMOL/L (ref 100–108)
CLARITY UR: NORMAL
CO2 SERPL-SCNC: 28 MMOL/L (ref 21–32)
COLOR UR: YELLOW
CREAT SERPL-MCNC: 1.22 MG/DL (ref 0.6–1.3)
CREAT UR-MCNC: 194 MG/DL
EST. AVERAGE GLUCOSE BLD GHB EST-MCNC: 134 MG/DL
GFR SERPL CREATININE-BSD FRML MDRD: 66 ML/MIN/1.73SQ M
GLUCOSE P FAST SERPL-MCNC: 121 MG/DL (ref 65–99)
GLUCOSE UR STRIP-MCNC: NEGATIVE MG/DL
HBA1C MFR BLD: 6.3 %
HCV AB SER QL: NORMAL
HGB UR QL STRIP.AUTO: NEGATIVE
KETONES UR STRIP-MCNC: NEGATIVE MG/DL
LEUKOCYTE ESTERASE UR QL STRIP: NEGATIVE
MICROALBUMIN UR-MCNC: 7.8 MG/L (ref 0–20)
MICROALBUMIN/CREAT 24H UR: 4 MG/G CREATININE (ref 0–30)
NITRITE UR QL STRIP: NEGATIVE
NON-SQ EPI CELLS URNS QL MICRO: NORMAL /HPF
PH UR STRIP.AUTO: 6.5 [PH]
POTASSIUM SERPL-SCNC: 4.4 MMOL/L (ref 3.5–5.3)
PROT SERPL-MCNC: 7.9 G/DL (ref 6.4–8.2)
PROT UR STRIP-MCNC: NEGATIVE MG/DL
RBC #/AREA URNS AUTO: NORMAL /HPF
SODIUM SERPL-SCNC: 136 MMOL/L (ref 136–145)
SP GR UR STRIP.AUTO: 1.02 (ref 1–1.03)
UROBILINOGEN UR QL STRIP.AUTO: 0.2 E.U./DL
WBC #/AREA URNS AUTO: NORMAL /HPF

## 2021-07-29 PROCEDURE — 86803 HEPATITIS C AB TEST: CPT

## 2021-07-29 PROCEDURE — 0002A SARS-COV-2 / COVID-19 MRNA VACCINE (PFIZER-BIONTECH) 30 MCG: CPT

## 2021-07-29 PROCEDURE — 36415 COLL VENOUS BLD VENIPUNCTURE: CPT

## 2021-07-29 PROCEDURE — 82570 ASSAY OF URINE CREATININE: CPT | Performed by: FAMILY MEDICINE

## 2021-07-29 PROCEDURE — 80053 COMPREHEN METABOLIC PANEL: CPT

## 2021-07-29 PROCEDURE — 82043 UR ALBUMIN QUANTITATIVE: CPT | Performed by: FAMILY MEDICINE

## 2021-07-29 PROCEDURE — 91300 SARS-COV-2 / COVID-19 MRNA VACCINE (PFIZER-BIONTECH) 30 MCG: CPT

## 2021-07-29 PROCEDURE — 81001 URINALYSIS AUTO W/SCOPE: CPT | Performed by: FAMILY MEDICINE

## 2021-07-29 PROCEDURE — 87591 N.GONORRHOEAE DNA AMP PROB: CPT

## 2021-07-29 PROCEDURE — 87491 CHLMYD TRACH DNA AMP PROBE: CPT

## 2021-07-29 PROCEDURE — 83036 HEMOGLOBIN GLYCOSYLATED A1C: CPT

## 2021-07-30 LAB
C TRACH DNA SPEC QL NAA+PROBE: NEGATIVE
N GONORRHOEA DNA SPEC QL NAA+PROBE: NEGATIVE

## 2021-08-07 DIAGNOSIS — I10 ESSENTIAL HYPERTENSION: ICD-10-CM

## 2021-08-07 RX ORDER — LISINOPRIL 10 MG/1
TABLET ORAL
Qty: 90 TABLET | Refills: 1 | Status: SHIPPED | OUTPATIENT
Start: 2021-08-07

## 2021-09-13 ENCOUNTER — OFFICE VISIT (OUTPATIENT)
Dept: BARIATRICS | Facility: CLINIC | Age: 36
End: 2021-09-13
Payer: COMMERCIAL

## 2021-09-13 VITALS
HEART RATE: 105 BPM | HEIGHT: 67 IN | SYSTOLIC BLOOD PRESSURE: 140 MMHG | RESPIRATION RATE: 12 BRPM | DIASTOLIC BLOOD PRESSURE: 90 MMHG | TEMPERATURE: 98.2 F | BODY MASS INDEX: 45.63 KG/M2 | WEIGHT: 290.7 LBS

## 2021-09-13 DIAGNOSIS — E66.01 MORBID OBESITY (HCC): Primary | ICD-10-CM

## 2021-09-13 DIAGNOSIS — G43.809 OTHER MIGRAINE WITHOUT STATUS MIGRAINOSUS, NOT INTRACTABLE: ICD-10-CM

## 2021-09-13 DIAGNOSIS — R73.03 PREDIABETES: ICD-10-CM

## 2021-09-13 DIAGNOSIS — I10 ESSENTIAL HYPERTENSION: ICD-10-CM

## 2021-09-13 DIAGNOSIS — R63.5 ABNORMAL WEIGHT GAIN: ICD-10-CM

## 2021-09-13 PROCEDURE — 3077F SYST BP >= 140 MM HG: CPT | Performed by: INTERNAL MEDICINE

## 2021-09-13 PROCEDURE — 99215 OFFICE O/P EST HI 40 MIN: CPT | Performed by: INTERNAL MEDICINE

## 2021-09-13 PROCEDURE — 3080F DIAST BP >= 90 MM HG: CPT | Performed by: INTERNAL MEDICINE

## 2021-09-13 PROCEDURE — 3008F BODY MASS INDEX DOCD: CPT | Performed by: FAMILY MEDICINE

## 2021-09-13 RX ORDER — METFORMIN HYDROCHLORIDE 750 MG/1
750 TABLET, EXTENDED RELEASE ORAL
Qty: 30 TABLET | Refills: 3 | Status: SHIPPED | OUTPATIENT
Start: 2021-09-13 | End: 2022-01-06 | Stop reason: SDUPTHER

## 2021-09-13 NOTE — PATIENT INSTRUCTIONS
Goal protein intake 90g at least per day  1500 calories or less  Try premier protein shake instead of herbal life  Will increase topamax to 100mg a day and start metformin  150 minutes of exercise per week   Eat Fruits with low glycemic index

## 2021-09-13 NOTE — PROGRESS NOTES
Assessment/Plan:  Angeline Grover was seen today for follow-up  Diagnoses and all orders for this visit:    Morbid obesity ([address] )    Other migraine without status migrainosus, not intractable    Essential hypertension    Abnormal weight gain    Prediabetes  -     metFORMIN (GLUCOPHAGE-XR) 750 mg 24 hr tablet; Take 1 tablet (750 mg total) by mouth daily with dinner          Above co-morbid conditions can improve with weight loss  Goals:  Goal protein intake 90g per day  Try premier protein shake instead of herbal life  BP and HR elevated  Will hold off on starting phentermine  Will increase topamax to 100mg a day and start metformin  150 minutes of exercise per week   Eat Fruits with low glycemic index    Follow up in approximately 2 months with Non-Surgical Physician/Advanced Practitioner  Subjective:   Chief Complaint   Patient presents with    Follow-up     with [facility]        Patient ID: Cara Moreno  is a 28 y o  female with excess weight/obesity here to pursue weight management    Patient is pursuing Conservative Program      HPI  -Initial weight loss goal of 5-10% weight loss for improved health  Wt Readings from Last 10 Encounters:   09/13/21 132 kg (290 lb 11 2 oz)   06/30/21 123 kg (272 lb)   06/28/21 127 kg (279 lb 6 4 oz)   06/16/21 124 kg (273 lb)   06/14/21 124 kg (274 lb)   06/08/21 124 kg (273 lb)   04/27/21 121 kg (267 lb)   03/09/20 119 kg (262 lb)   02/06/20 120 kg (264 lb)   01/10/20 119 kg (263 lb 6 4 oz)     Initial weight: 273 lbs  Current weight: 290 lbs  Change in weight: +17 lbs  Goal:  2176-3048 calories, sometimes over to 1600    B- 1 herbal life shake  S-  L- tuna fish sandwich   S- cheese cubes   D- 1 herbal life shake  S-  Drinks- 1/2 gallon to 1 gallon  Alcohol- no   Exercise- walking a mile a day  Sleep - doesn't sleep well; wakes up a lot  6 hours interrupted   Hasn't made an appt with sleep medicine yet   Feels bloated as her period is coming up      The following portions of the patient's history were reviewed and updated as appropriate: allergies, current medications, past family history, past medical history, past social history, past surgical history, and problem list     Review of Systems   Respiratory: Negative  Gastrointestinal: Negative  Musculoskeletal: Negative  Neurological: Positive for numbness  Negative for headaches (improved)  Psychiatric/Behavioral: Negative  Objective:  /90   Pulse 105   Temp 98 2 °F (36 8 °C)   Resp 12   Ht 5' 6 7" (1 694 m)   Wt 132 kg (290 lb 11 2 oz)   BMI 45 94 kg/m²   Constitutional: Well-developed, well-nourished and obese Body mass index is 45 94 kg/m²  [place] HEENT: No conjunctival pallor or jaundice  Pulmonary: No increased work of breathing or signs of respiratory distress  CV: Normal rate, well-perfused   GI: Obese  Non-distended  MSK: No edema   Neuro: Oriented to person, place and time  Normal Speech  Normal gait  Psych: Normal affect and mood       Labs and Imaging  Reviewed

## 2021-09-17 ENCOUNTER — TELEMEDICINE (OUTPATIENT)
Dept: FAMILY MEDICINE CLINIC | Facility: CLINIC | Age: 36
End: 2021-09-17
Payer: COMMERCIAL

## 2021-09-17 ENCOUNTER — TELEPHONE (OUTPATIENT)
Dept: OTHER | Facility: OTHER | Age: 36
End: 2021-09-17

## 2021-09-17 VITALS
BODY MASS INDEX: 42.35 KG/M2 | WEIGHT: 268 LBS | DIASTOLIC BLOOD PRESSURE: 80 MMHG | SYSTOLIC BLOOD PRESSURE: 132 MMHG | HEART RATE: 88 BPM

## 2021-09-17 DIAGNOSIS — Z20.822 EXPOSURE TO COVID-19 VIRUS: Primary | ICD-10-CM

## 2021-09-17 DIAGNOSIS — R73.03 PREDIABETES: ICD-10-CM

## 2021-09-17 DIAGNOSIS — I10 ESSENTIAL HYPERTENSION: ICD-10-CM

## 2021-09-17 PROCEDURE — 99214 OFFICE O/P EST MOD 30 MIN: CPT | Performed by: FAMILY MEDICINE

## 2021-09-17 PROCEDURE — 1036F TOBACCO NON-USER: CPT | Performed by: FAMILY MEDICINE

## 2021-09-17 NOTE — PROGRESS NOTES
COVID-19 Outpatient Progress Note    Assessment/Plan:    Problem List Items Addressed This Visit        Cardiovascular and Mediastinum    Essential hypertension     BP at goal  Continue current meds  Other    Prediabetes     Pt was started on metformin by bariatrics  Continue the same  Cut back on carbs, work on weight loss and increase the exercise  Other Visit Diagnoses     Exposure to COVID-19 virus    -  Primary    Relevant Orders    Novel Coronavirus (Covid-19),PCR [facility] - Collected in Office         Disposition:     I recommended COVID-19 PCR testing on or after day 5 since last exposure and if negative can end quarantine after 7 days  Patient was instructed to watch for symptoms until 14 days after exposure  If patient were to develop symptoms, they should immediately self isolate and call our office for further guidance  Pt will be in the office on Monday for swabbing for the exposure  Advised her to wear a mask around family members and she is quarantined until she hears from this office  Suggest vitamin d3 2000 daily and zinc 50 mg weekly  Great job with weight loss  Discussed all labs with pt  Stay on the metformin  Continue to work on the weight  I have spent 15 minutes directly with the patient  Greater than 50% of this time was spent in counseling/coordination of care regarding: instructions for management, importance of treatment compliance, risk factor reductions and impressions  Verification of patient location:    Patient is located in the following state in which I hold an active license [place]    Encounter provider Gordon Caldwell, [facility]    Provider located at [address] [place]  [address] [facility] [address] [phone]    Recent Visits  No visits were found meeting these conditions    Showing recent visits within past 7 days and meeting all other requirements  Today's Visits  Date Type Provider Dept   09/17/21 Telemedicine SAGRARIO [facility] today's visits and meeting all other requirements  Future Appointments  No visits were found meeting these conditions  Showing future appointments within next 150 days and meeting all other requirements     This virtual check-in was done via [employer] and patient was informed that this is a secure, HIPAA-compliant platform  She agrees to proceed  Patient agrees to participate in a virtual check in via telephone or video visit instead of presenting to the office to address urgent/immediate medical needs  Patient is aware this is a billable service  After connecting through [facility], the patient was identified by name and date of birth  Brenden Conti was informed that this was a telemedicine visit and that the exam was being conducted confidentially over secure lines  My office door was closed  No one else was in the room  Brenden Conti acknowledged consent and understanding of privacy and security of the telemedicine visit  I informed the patient that I have reviewed her record in Epic and presented the opportunity for her to ask any questions regarding the visit today  The patient agreed to participate  Subjective:   Brenden Conti is a 28 y o  female who is concerned about COVID-19  Patient denies fever, chills, fatigue, malaise, congestion, rhinorrhea, sore throat, anosmia, loss of taste, cough, shortness of breath, chest tightness, abdominal pain, nausea, vomiting, diarrhea, myalgias and headaches       COVID-19 vaccination status: Fully vaccinated    Exposure:   Contact with a person who is under investigation (PUI) for or who is positive for COVID-19 within the last 14 days?: Yes    Hospitalized recently for fever and/or lower respiratory symptoms?: No      Currently a healthcare worker that is involved in direct patient care?: No      Works in a special setting where the risk of COVID-19 transmission may be high? (this may include long-term care, correctional and skilled nursing facilities; homeless shelters; assisted-living facilities and group homes ): No      Resident in a special setting where the risk of COVID-19 transmission may be high? (this may include long-term care, correctional and skilled nursing facilities; homeless shelters; assisted-living facilities and group homes ): No      Pt was called as her sister and her niece tested positive so she could not come in for a visit  They tested positive today  Pt has been vaccinated and has no symptoms  Niece is 7 and sister is not vaccinated  Pt also had labs done recently  No results found for: Tanisha Garcia, OSWALDO, [address]  Past Medical History:   Diagnosis Date    Asthma     childhood    [facility]     gestational    Elective      Resolved 2016     H/O gestational diabetes mellitus, not currently pregnant 2019    Hypertension     Migraine     Morbid obesity with BMI of 45 0-49 9, adult ([facility] )     Varicella     childhood     Past Surgical History:   Procedure Laterality Date    INDUCED        Current Outpatient Medications   Medication Sig Dispense Refill    amLODIPine (NORVASC) 5 mg tablet Take 1 tablet (5 mg total) by mouth daily 30 tablet 3    levonorgestrel (MIRENA) 20 MCG/24HR IUD 1 each by Intrauterine route once      lisinopril (ZESTRIL) 10 mg tablet TAKE 1 TABLET BY MOUTH EVERY DAY 90 tablet 1    metFORMIN (GLUCOPHAGE-XR) 750 mg 24 hr tablet Take 1 tablet (750 mg total) by mouth daily with dinner 30 tablet 3    topiramate (TOPAMAX) 50 MG tablet Take 1 tablet at night 90 tablet 1     No current facility-administered medications for this visit  No Known Allergies    Review of Systems   Constitutional: Negative for chills, fatigue and fever  HENT: Negative for congestion, rhinorrhea and sore throat      Respiratory: Negative for cough, chest tightness and shortness of breath  Gastrointestinal: Negative for abdominal pain, diarrhea, nausea and vomiting  Musculoskeletal: Negative for myalgias  Neurological: Negative for headaches  Objective:    Vitals:    09/17/21 1636   BP: 132/80   Pulse: 88   Weight: 122 kg (268 lb)       Physical Exam    VIRTUAL VISIT DISCLAIMER    Marian Greenwood verbally agrees to participate in [employer]  Pt is aware that [employer] could be limited without vital signs or the ability to perform a full hands-on physical exam  Elena Rubio understands she or the provider may request at any time to terminate the video visit and request the patient to seek care or treatment in person

## 2021-09-17 NOTE — ASSESSMENT & PLAN NOTE
Pt was started on metformin by bariatrics  Continue the same  Cut back on carbs, work on weight loss and increase the exercise

## 2021-09-17 NOTE — PATIENT INSTRUCTIONS
Reviewed all with patient  We did discuss her labs and that she is prediabetic with a hemoglobin A1c of 6 3  She has been already started on metformin by bariatric  She is working on her weight  Continue to work on your weight cut back on carbohydrates increase the exercise and consider bariatric surgery  For the exposure to COVID as your asymptomatic now I would like you to f/u here on Monday at 11 am for swabbing  Maintain COVID precautions until negative test  Must quarantine  Please have your sister and her niece and yourself wear a mask around everyone in the home  Avoid close contact with them   vitamin D3 2000 units daily and zinc 50 mg per week  Call if you develop symptoms of COVID

## 2021-09-17 NOTE — TELEPHONE ENCOUNTER
Patient called to cancel her appointment since she is living with her sister who tested positive for COVID today  Patient's appointment was changed to a virtual visit instead  Patient is also requesting a COVID test  Patient advised to follow up with provider during appointment for testing

## 2022-01-06 ENCOUNTER — OFFICE VISIT (OUTPATIENT)
Dept: FAMILY MEDICINE CLINIC | Facility: CLINIC | Age: 37
End: 2022-01-06
Payer: COMMERCIAL

## 2022-01-06 VITALS
HEIGHT: 67 IN | SYSTOLIC BLOOD PRESSURE: 130 MMHG | TEMPERATURE: 97.3 F | DIASTOLIC BLOOD PRESSURE: 90 MMHG | OXYGEN SATURATION: 98 % | BODY MASS INDEX: 45.99 KG/M2 | RESPIRATION RATE: 16 BRPM | HEART RATE: 98 BPM | WEIGHT: 293 LBS

## 2022-01-06 DIAGNOSIS — R73.03 PREDIABETES: ICD-10-CM

## 2022-01-06 DIAGNOSIS — H00.11 CHALAZION OF RIGHT UPPER EYELID: ICD-10-CM

## 2022-01-06 DIAGNOSIS — I10 ESSENTIAL HYPERTENSION: Primary | ICD-10-CM

## 2022-01-06 PROCEDURE — 3075F SYST BP GE 130 - 139MM HG: CPT | Performed by: FAMILY MEDICINE

## 2022-01-06 PROCEDURE — 99214 OFFICE O/P EST MOD 30 MIN: CPT | Performed by: FAMILY MEDICINE

## 2022-01-06 PROCEDURE — 3008F BODY MASS INDEX DOCD: CPT | Performed by: NURSE PRACTITIONER

## 2022-01-06 PROCEDURE — 3725F SCREEN DEPRESSION PERFORMED: CPT | Performed by: FAMILY MEDICINE

## 2022-01-06 PROCEDURE — 3080F DIAST BP >= 90 MM HG: CPT | Performed by: FAMILY MEDICINE

## 2022-01-06 RX ORDER — METFORMIN HYDROCHLORIDE 750 MG/1
750 TABLET, EXTENDED RELEASE ORAL
Qty: 30 TABLET | Refills: 3 | Status: SHIPPED | OUTPATIENT
Start: 2022-01-06 | End: 2022-05-06 | Stop reason: SDUPTHER

## 2022-01-06 RX ORDER — AMLODIPINE BESYLATE 5 MG/1
5 TABLET ORAL DAILY
Qty: 30 TABLET | Refills: 3 | Status: SHIPPED | OUTPATIENT
Start: 2022-01-06

## 2022-01-06 NOTE — ASSESSMENT & PLAN NOTE
Home bp's are wnl  Advised to continue to work on weight loss and cutting back on carbs and increasing exercise  Work with bariatrics

## 2022-01-06 NOTE — ASSESSMENT & PLAN NOTE
Will check hba1c today -> 6 4  Will monitor closely and consider Rybelsus but for now, strongly suggest bariatric consideration

## 2022-01-06 NOTE — PROGRESS NOTES
Assessment/Plan:     Chronic Problems:  Essential hypertension  Home bp's are wnl  Advised to continue to work on weight loss and cutting back on carbs and increasing exercise  Work with bariatrics  Prediabetes  Will check hba1c today -> 6 4  Will monitor closely and consider Rybelsus but for now, strongly suggest bariatric consideration  Visit Diagnosis:  Diagnoses and all orders for this visit:    Essential hypertension  -     amLODIPine (NORVASC) 5 mg tablet; Take 1 tablet (5 mg total) by mouth daily    Prediabetes  -     metFORMIN (GLUCOPHAGE-XR) 750 mg 24 hr tablet; Take 1 tablet (750 mg total) by mouth daily with dinner  -     Hemoglobin A1C; Future    Chalazion of right upper eyelid  Comments: Will refer to opthamology for evaluation  Advised to warm soak the area/    Orders:  -     Ambulatory referral to Ophthalmology; Future          Subjective:    Patient ID: Teddy Siddiqi is a 39 y o  female  Pt is here for routine f/u appt  Usually checks her bp's at home and reports numbers are in the 120's/80's  Not high per pt  Needs renewals on meds  Pt does not want the flu shot  Takes all other meds as directed  No side effects noted  Pt is following with bariatrics and needs to reschedule  The following portions of the patient's history were reviewed and updated as appropriate: allergies, current medications, past family history, past medical history, past social history, past surgical history and problem list     Review of Systems   Constitutional: Negative for chills, diaphoresis, fatigue and fever  HENT: Negative  Eyes: Negative  Seen recently [facility]  Pt has had a ?stye on the right upper lid for over 4 months  Respiratory: Negative for cough, shortness of breath and wheezing  Cardiovascular: Negative for chest pain and palpitations  Gastrointestinal: Negative  Genitourinary: Negative           FDLMP - December 24th   Neurological: Negative for dizziness, light-headedness and headaches  Psychiatric/Behavioral: Negative for dysphoric mood  The patient is not nervous/anxious  /90   Pulse 98   Temp (!) 97 3 °F (36 3 °C)   Resp 16   Ht 5' 6 7" (1 694 m)   Wt 133 kg (293 lb)   SpO2 98%   BMI 46 30 kg/m²   Social History     Socioeconomic History    Marital status: /Civil Union     Spouse name: Not on file    Number of children: Not on file    Years of education: Not on file    Highest education level: Not on file   Occupational History    Not on file   Tobacco Use    Smoking status: Never Smoker    Smokeless tobacco: Never Used   Vaping Use    Vaping Use: Never used   Substance and Sexual Activity    Alcohol use: No    Drug use: No    Sexual activity: Yes     Partners: Male     Birth control/protection: I U D       Comment:    Other Topics Concern    Not on file   Social History Narrative    Always uses seat belt     Social Determinants of Health     Financial Resource Strain: Not on file   Food Insecurity: Not on file   Transportation Needs: Not on file   Physical Activity: Not on file   Stress: Not on file   Social Connections: Not on file   Intimate Partner Violence: Not on file   Housing Stability: Not on file     Past Medical History:   Diagnosis Date    Asthma     childhood    Diabetes mellitus ([facility] )     gestational    Elective      Resolved 2016     H/O gestational diabetes mellitus, not currently pregnant 2019    Hypertension     Migraine     Morbid obesity with BMI of 45 0-49 9, adult ([facility] )     Prediabetes 2021    Varicella     childhood     Family History   Problem Relation Age of Onset    Cancer Mother     Heart disease Mother     Stroke Mother     Other Mother     Hypertension Mother     Breast cancer Mother     Diabetes Father     Asthma Sister     Asthma Brother     Diabetes Paternal Grandmother     Asthma Brother     Asthma Brother     Asthma Sister  Asthma Sister      Past Surgical History:   Procedure Laterality Date    INDUCED          Current Outpatient Medications:     amLODIPine (NORVASC) 5 mg tablet, Take 1 tablet (5 mg total) by mouth daily, Disp: 30 tablet, Rfl: 3    levonorgestrel (MIRENA) 20 MCG/24HR IUD, 1 each by Intrauterine route once, Disp: , Rfl:     lisinopril (ZESTRIL) 10 mg tablet, TAKE 1 TABLET BY MOUTH EVERY DAY, Disp: 90 tablet, Rfl: 1    metFORMIN (GLUCOPHAGE-XR) 750 mg 24 hr tablet, Take 1 tablet (750 mg total) by mouth daily with dinner, Disp: 30 tablet, Rfl: 3    topiramate (TOPAMAX) 50 MG tablet, Take 1 tablet at night, Disp: 90 tablet, Rfl: 1    No Known Allergies       Lab Review   No visits with results within 2 Month(s) from this visit  Latest known visit with results is:   Appointment on 2021   Component Date Value    Sodium 2021 136     Potassium 2021 4 4     Chloride 2021 102     CO2 2021 28     ANION GAP 2021 6     BUN 2021 14     Creatinine 2021 1 22     Glucose, Fasting 2021 121*    Calcium 2021 8 8     AST 2021 14     ALT 2021 30     Alkaline Phosphatase 2021 86     Total Protein 2021 7 9     Albumin 2021 3 6     Total Bilirubin 2021 0 39     eGFR 2021 66     Hemoglobin A1C 2021 6 3*    EAG 2021 134     Hepatitis C Ab 2021 Non-reactive     N gonorrhoeae, DNA Probe 2021 Negative     Chlamydia trachomatis, D* 2021 Negative         Imaging: No results found  Objective:     Physical Exam  Vitals and nursing note reviewed  Constitutional:       General: She is not in acute distress  Appearance: She is well-developed  She is obese  She is not ill-appearing, toxic-appearing or diaphoretic  HENT:      Head: Normocephalic and atraumatic  Right Ear: Tympanic membrane, ear canal and external ear normal  There is no impacted cerumen        Left Ear: Tympanic membrane, ear canal and external ear normal  There is no impacted cerumen  Nose: Nose normal       Mouth/Throat:      Mouth: Mucous membranes are moist       Pharynx: No oropharyngeal exudate  Eyes:      General:         Right eye: No discharge  Left eye: No discharge  Conjunctiva/sclera: Conjunctivae normal       Pupils: Pupils are equal, round, and reactive to light  Comments: Chalazion noted right upper lid  Neck:      Thyroid: No thyromegaly  Cardiovascular:      Rate and Rhythm: Normal rate and regular rhythm  Heart sounds: Normal heart sounds  No murmur heard  No friction rub  Pulmonary:      Effort: Pulmonary effort is normal  No respiratory distress  Breath sounds: Normal breath sounds  No wheezing or rales  Abdominal:      General: Bowel sounds are normal       Palpations: Abdomen is soft  Tenderness: There is no abdominal tenderness  Musculoskeletal:         General: No tenderness or deformity  Normal range of motion  Cervical back: Normal range of motion and neck supple  Lymphadenopathy:      Cervical: No cervical adenopathy  Skin:     General: Skin is warm and dry  Findings: No rash  Neurological:      Mental Status: She is alert and oriented to person, place, and time  Cranial Nerves: No cranial nerve deficit  Psychiatric:         Behavior: Behavior normal          Thought Content: Thought content normal          Judgment: Judgment normal            Patient Instructions   Reviewed all with patient  Blood pressure today is mildly elevated however she reports her home blood pressures are normal   I strongly suggest she keep the follow-up with bariatric send consider bariatric surgery for a permanent fix to her would weight issues  Hemoglobin A1c today was taken and is 6 4  Continue current medications for now    Please try to spot check the blood pressures at home record the numbers and follow-up sooner if the blood pressures remain in the 130s over 90s  Let us know if you would like to change your mind about the flu shot  Have the blood work done prior to the next appointment as I suspect you may be needing additional meds for diabetes  SAGRARIO Richardson    Portions of the record may have been created with voice recognition software  Occasional wrong word or "sound a like" substitutions may have occurred due to the inherent limitations of voice recognition software  Read the chart carefully and recognize, using context, where substitutions have occurred

## 2022-01-14 ENCOUNTER — TELEPHONE (OUTPATIENT)
Dept: OBGYN CLINIC | Facility: CLINIC | Age: 37
End: 2022-01-14

## 2022-01-14 NOTE — TELEPHONE ENCOUNTER
Pt c/o external burning and redness  No dischor internal symptoms  Adv hc cr tid  tcb if increased symptoms

## 2022-01-18 ENCOUNTER — TELEMEDICINE (OUTPATIENT)
Dept: FAMILY MEDICINE CLINIC | Facility: CLINIC | Age: 37
End: 2022-01-18
Payer: COMMERCIAL

## 2022-01-18 DIAGNOSIS — Z20.822 EXPOSURE TO COVID-19 VIRUS: ICD-10-CM

## 2022-01-18 DIAGNOSIS — B34.9 VIRAL INFECTION, UNSPECIFIED: Primary | ICD-10-CM

## 2022-01-18 PROCEDURE — U0003 INFECTIOUS AGENT DETECTION BY NUCLEIC ACID (DNA OR RNA); SEVERE ACUTE RESPIRATORY SYNDROME CORONAVIRUS 2 (SARS-COV-2) (CORONAVIRUS DISEASE [COVID-19]), AMPLIFIED PROBE TECHNIQUE, MAKING USE OF HIGH THROUGHPUT TECHNOLOGIES AS DESCRIBED BY CMS-2020-01-R: HCPCS | Performed by: NURSE PRACTITIONER

## 2022-01-18 PROCEDURE — U0005 INFEC AGEN DETEC AMPLI PROBE: HCPCS | Performed by: NURSE PRACTITIONER

## 2022-01-18 PROCEDURE — 99213 OFFICE O/P EST LOW 20 MIN: CPT | Performed by: NURSE PRACTITIONER

## 2022-01-18 PROCEDURE — 1036F TOBACCO NON-USER: CPT | Performed by: NURSE PRACTITIONER

## 2022-01-18 NOTE — PROGRESS NOTES
COVID-19 Outpatient Progress Note    Assessment/Plan:    Problem List Items Addressed This Visit     None      Visit Diagnoses     Viral infection, unspecified    -  Primary    Encouraged rest, increased hydration, and adequate nutrtion  Tea with honey, Coricidian OTC for cough  Encouraged use of vitamin-c, d, zinc     Relevant Orders    COVID Only- Collected at [facility]    Exposure to COVID-19 virus        Wishes to go to [place] for swab  Informed to keep Covid precautions while a/w results  Discussed quarantine times if positive  Relevant Orders    COVID Only- Collected at [facility] or [facility]         Disposition:     Referred patient to centralized site to test for COVID-19  I have spent 5 minutes directly with the patient  Greater than 50% of this time was spent in counseling/coordination of care regarding: instructions for management, patient and family education and impressions  Encounter provider SAGRARIO Son    Provider located at [place] [address] [address]  [place] [address] [address] [phone]    Recent Visits  No visits were found meeting these conditions  Showing recent visits within past 7 days and meeting all other requirements  Today's Visits  Date Type Provider Dept   [facility] visits and meeting all other requirements  Future Appointments  No visits were found meeting these conditions  Showing future appointments within next 150 days and meeting all other requirements     This virtual check-in was done via [address] and patient was informed that this is a secure, HIPAA-compliant platform  She agrees to proceed  Patient agrees to participate in a virtual check in via telephone or video visit instead of presenting to the office to address urgent/immediate medical needs   Patient is aware this is a billable service  After connecting through [place], the patient was identified by name and date of birth  Ronny Langley was informed that this was a telemedicine visit and that the exam was being conducted confidentially over secure lines  My office door was closed  No one else was in the room  Ronny Langley acknowledged consent and understanding of privacy and security of the telemedicine visit  I informed the patient that I have reviewed her record in Epic and presented the opportunity for her to ask any questions regarding the visit today  The patient agreed to participate  Verification of patient location:  Patient is located in the following state in which I hold an active license: [place]    Subjective:   Ronny Langley is a 39 y o  female who is concerned about COVID-19  Patient's symptoms include rhinorrhea and cough  Patient denies fever, chills, fatigue, malaise, congestion, sore throat, anosmia, loss of taste, shortness of breath, chest tightness, abdominal pain, nausea, vomiting, diarrhea, myalgias and headaches       - Date of symptom onset: 1/15/2022  - Date of exposure: 1/14/2022      COVID-19 vaccination status: Fully vaccinated (primary series)    Exposure:   Contact with a person who is under investigation (PUI) for or who is positive for COVID-19 within the last 14 days?: Yes    Hospitalized recently for fever and/or lower respiratory symptoms?: No      Currently a healthcare worker that is involved in direct patient care?: No      Works in a special setting where the risk of COVID-19 transmission may be high? (this may include long-term care, correctional and residential facilities; homeless shelters; assisted-living facilities and group homes ): No      Resident in a special setting where the risk of COVID-19 transmission may be high? (this may include long-term care, correctional and residential facilities; homeless shelters; assisted-living facilities and group homes ): No      No results found for: Rooney Adam, SARSCORONAVI, CORONAVIRUSR, [address]  Past Medical History:   Diagnosis Date    Asthma     childhood    Diabetes mellitus ([facility] )     gestational    Elective      Resolved 2016     H/O gestational diabetes mellitus, not currently pregnant 2019    Hypertension     Migraine     Morbid obesity with BMI of 45 0-49 9, adult ([facility] )     Prediabetes 2021    Varicella     childhood     Past Surgical History:   Procedure Laterality Date    INDUCED        Current Outpatient Medications   Medication Sig Dispense Refill    amLODIPine (NORVASC) 5 mg tablet Take 1 tablet (5 mg total) by mouth daily 30 tablet 3    levonorgestrel (MIRENA) 20 MCG/24HR IUD 1 each by Intrauterine route once      lisinopril (ZESTRIL) 10 mg tablet TAKE 1 TABLET BY MOUTH EVERY DAY 90 tablet 1    metFORMIN (GLUCOPHAGE-XR) 750 mg 24 hr tablet Take 1 tablet (750 mg total) by mouth daily with dinner 30 tablet 3    topiramate (TOPAMAX) 50 MG tablet Take 1 tablet at night 90 tablet 1     No current facility-administered medications for this visit  No Known Allergies    Review of Systems   Constitutional: Negative for chills, fatigue and fever  HENT: Positive for rhinorrhea  Negative for congestion, ear pain, postnasal drip, sinus pressure, sinus pain and sore throat  Respiratory: Positive for cough  Negative for chest tightness, shortness of breath and wheezing  Cardiovascular: Negative for chest pain and palpitations  Gastrointestinal: Negative for abdominal pain, diarrhea, nausea and vomiting  Genitourinary: Negative for decreased urine volume  Musculoskeletal: Negative for arthralgias, back pain and myalgias  Skin: Negative for color change and rash  Neurological: Negative for dizziness, numbness and headaches  Psychiatric/Behavioral: Negative for confusion  Objective:     There were no vitals filed for this visit     Physical Exam  Constitutional:       General: She is not in acute distress  Appearance: Normal appearance  She is not ill-appearing  HENT:      Head: Normocephalic  Right Ear: External ear normal       Left Ear: External ear normal    Pulmonary:      Effort: Pulmonary effort is normal  No respiratory distress  Musculoskeletal:         General: Normal range of motion  Cervical back: Normal range of motion  Skin:     Coloration: Skin is not pale  Neurological:      Mental Status: She is alert and oriented to person, place, and time  Psychiatric:         Mood and Affect: Mood normal          Behavior: Behavior normal          VIRTUAL VISIT DISCLAIMER    Margaux Smith verbally agrees to participate in [employer]  Pt is aware that [employer] could be limited without vital signs or the ability to perform a full hands-on physical exam  Elena Diehl understands she or the provider may request at any time to terminate the video visit and request the patient to seek care or treatment in person

## 2022-01-18 NOTE — PATIENT INSTRUCTIONS
Viral Syndrome   AMBULATORY CARE:   Viral syndrome  is a term used for symptoms of an infection caused by a virus  Viruses are spread easily from person to person through the air and on shared items  Signs and symptoms  may start slowly or suddenly and last hours to days  They can be mild to severe and can change over days or hours  You may have any of the following:  · Fever and chills    · A runny or stuffy nose    · Cough, sore throat, or hoarseness    · Headache, or pain and pressure around your eyes    · Muscle aches and joint pain    · Shortness of breath or wheezing    · Abdominal pain, cramps, and diarrhea    · Nausea, vomiting, or loss of appetite    Call your local emergency number (911 in the [facility] [facility]) or have someone else call if:   · You have a seizure  · You cannot be woken  · You have chest pain or trouble breathing  Seek care immediately if:   · You have a stiff neck, a bad headache, and sensitivity to light  · You feel weak, dizzy, or confused  · You stop urinating or urinate a lot less than usual     · You cough up blood or thick yellow or green mucus  · You have severe abdominal pain or your abdomen is larger than usual     Call your doctor if:   · Your symptoms do not get better with treatment or get worse after 3 days  · You have a rash or ear pain  · You have burning when you urinate  · You have questions or concerns about your condition or care  Treatment for viral syndrome  may include medicines to manage your symptoms  Antibiotics are not given for a viral infection  You may  need any of the following:  · Acetaminophen  decreases pain and fever  It is available without a doctor's order  Ask how much to take and how often to take it  Follow directions  Read the labels of all other medicines you are using to see if they also contain acetaminophen, or ask your doctor or pharmacist  Acetaminophen can cause liver damage if not taken correctly   Do not use more than 4 grams (4,000 milligrams) total of acetaminophen in one day  · NSAIDs , such as ibuprofen, help decrease swelling, pain, and fever  NSAIDs can cause stomach bleeding or kidney problems in certain people  If you take blood thinner medicine, always ask your healthcare provider if NSAIDs are safe for you  Always read the medicine label and follow directions  · Cold medicine  helps decrease swelling, control a cough, and relieve chest or nasal congestion  · Saline nasal spray  helps decrease nasal congestion  Manage your symptoms:   · Drink liquids as directed to prevent dehydration  Ask how much liquid to drink each day and which liquids are best for you  Ask if you should drink an oral rehydration solution (ORS)  An ORS has the right amounts of water, salts, and sugar you need to replace body fluids  This may help prevent dehydration caused by vomiting or diarrhea  Do not drink liquids with caffeine  Liquids with caffeine can make dehydration worse  · Get plenty of rest to help your body heal   Take naps throughout the day  Ask your healthcare provider when you can return to work and your normal activities  · Use a cool mist humidifier to help you breathe easier  Ask your healthcare provider how to use a cool mist humidifier  · Eat honey or use cough drops for a sore throat  Cough drops are available without a doctor's order  Follow directions for taking cough drops  · Do not smoke or be close to anyone who is smoking  Nicotine and other chemicals in cigarettes and cigars can cause lung damage  Smoking can also delay healing  Ask your healthcare provider for information if you currently smoke and need help to quit  E-cigarettes or smokeless tobacco still contain nicotine  Talk to your healthcare provider before you use these products  Prevent the spread of germs:       · Wash your hands often  Wash your hands several times each day   Wash after you use the bathroom, change a child's diaper, and before you prepare or eat food  Use soap and water every time  Rub your soapy hands together, lacing your fingers  Wash the front and back of your hands, and in between your fingers  Use the fingers of one hand to scrub under the fingernails of the other hand  Wash for at least 20 seconds  Rinse with warm, running water for several seconds  Then dry your hands with a clean towel or paper towel  Use hand  that contains alcohol if soap and water are not available  Do not touch your eyes, nose, or mouth without washing your hands first          · Cover a sneeze or cough  Use a tissue that covers your mouth and nose  Throw the tissue away in a trash can right away  Use the bend of your arm if a tissue is not available  Wash your hands well with soap and water or use a hand   · Stay away from others while you are sick  Avoid crowds as much as possible  · Ask about vaccines you may need  Talk to your healthcare provider about your vaccine history  He or she will tell you which vaccines you need, and when to get them  ? Get the influenza (flu) vaccine as soon as recommended each year  The flu vaccine is available starting in September or October  Flu viruses change, so it is important to get a flu vaccine every year  ? Get the pneumonia vaccine if recommended  This vaccine is usually recommended every 5 years  Your provider will tell you when to get this vaccine, if needed  Follow up with your doctor as directed:  Write down your questions so you remember to ask them during your visits  © [employer] 2021 Information is for End User's use only and may not be sold, redistributed or otherwise used for commercial purposes  All illustrations and images included in CareNotes® are the copyrighted property of [employer]  or Azeem Ward  The above information is an  only  It is not intended as medical advice for individual conditions or treatments  Talk to your doctor, nurse or pharmacist before following any medical regimen to see if it is safe and effective for you

## 2022-01-31 ENCOUNTER — TELEPHONE (OUTPATIENT)
Dept: OBGYN CLINIC | Facility: CLINIC | Age: 37
End: 2022-01-31

## 2022-01-31 DIAGNOSIS — R35.0 URINARY FREQUENCY: Primary | ICD-10-CM

## 2022-01-31 NOTE — TELEPHONE ENCOUNTER
Pt contacted and informed as directed  Pt requested of with anupam but next avail at location pt request e-toni is not till end of feb  Pt requested labs for now  Order placed for ua and culture  Pt agree to have them completed

## 2022-01-31 NOTE — TELEPHONE ENCOUNTER
Pt sx began again a week ago 01/29/22  Sx frequency urinating, pelvic discomfort  Pt denied burning with urination  Pt stated sx began after she had intercourse with latex condoms with call 01/14  Pt is wondering if allergic reaction and would like to know what provider thinks  Pt stated she was informed to use hydrocortisone cream in which she used but still having discomfort    Please advise

## 2022-01-31 NOTE — TELEPHONE ENCOUNTER
----- Message from [facility] sent at 1/29/2022  4:59 PM EST -----  Regarding: UTI  Anibal Grey I was wondering if you could call a prescription in for me! I called the nurse alla a week ago abt the issue and it seem to get better but then I got my menstrual and it feels like it came back!! When I hold my urine I feel pain but other than that its not pain but an irritable feeling!

## 2022-01-31 NOTE — TELEPHONE ENCOUNTER
Hard to know without a visit  Would she like to have her urine checked? Has she tried over the counter yeast medicine?

## 2022-02-11 ENCOUNTER — LAB (OUTPATIENT)
Dept: LAB | Facility: HOSPITAL | Age: 37
End: 2022-02-11
Payer: COMMERCIAL

## 2022-02-11 DIAGNOSIS — R35.0 URINARY FREQUENCY: ICD-10-CM

## 2022-02-11 LAB
BACTERIA UR QL AUTO: ABNORMAL /HPF
BILIRUB UR QL STRIP: NEGATIVE
CLARITY UR: CLEAR
COLOR UR: YELLOW
GLUCOSE UR STRIP-MCNC: NEGATIVE MG/DL
HGB UR QL STRIP.AUTO: ABNORMAL
KETONES UR STRIP-MCNC: NEGATIVE MG/DL
LEUKOCYTE ESTERASE UR QL STRIP: ABNORMAL
NITRITE UR QL STRIP: NEGATIVE
NON-SQ EPI CELLS URNS QL MICRO: ABNORMAL /HPF
OTHER STN SPEC: ABNORMAL
PH UR STRIP.AUTO: 6 [PH]
PROT UR STRIP-MCNC: NEGATIVE MG/DL
RBC #/AREA URNS AUTO: ABNORMAL /HPF
SP GR UR STRIP.AUTO: 1.02 (ref 1–1.03)
UROBILINOGEN UR QL STRIP.AUTO: 0.2 E.U./DL
WBC #/AREA URNS AUTO: ABNORMAL /HPF

## 2022-02-11 PROCEDURE — 87086 URINE CULTURE/COLONY COUNT: CPT

## 2022-02-11 PROCEDURE — 81001 URINALYSIS AUTO W/SCOPE: CPT

## 2022-02-13 LAB — BACTERIA UR CULT: NORMAL

## 2022-02-15 ENCOUNTER — TELEPHONE (OUTPATIENT)
Dept: OBGYN CLINIC | Facility: MEDICAL CENTER | Age: 37
End: 2022-02-15

## 2022-02-15 NOTE — TELEPHONE ENCOUNTER
I spoke with the patient and she said she will go to the lab for the urine culture as Carrington Quintana is booked out until March

## 2022-02-15 NOTE — TELEPHONE ENCOUNTER
----- Message from Jena Mathis, [address] sent at 2/15/2022 10:10 AM EST -----  Discussed urine culture results being contaminated  Patient will repeat the urine culture (ordered) and would like to be called with an appt with [address] [place] [address]  Discussed obtaining a clean catch urine

## 2022-02-21 NOTE — RESULT ENCOUNTER NOTE
Scheduled Ms Jorge Alberto Aranda for a f/u from her urine clean catch on Wednesday, February 23, 2022 @11:00am with SAGRARIO Ricci

## 2022-03-02 ENCOUNTER — APPOINTMENT (OUTPATIENT)
Dept: LAB | Facility: HOSPITAL | Age: 37
End: 2022-03-02
Payer: COMMERCIAL

## 2022-03-02 DIAGNOSIS — R30.0 BURNING WITH URINATION: ICD-10-CM

## 2022-03-02 DIAGNOSIS — R35.0 URINARY FREQUENCY: ICD-10-CM

## 2022-03-02 PROCEDURE — 87086 URINE CULTURE/COLONY COUNT: CPT

## 2022-03-03 LAB — BACTERIA UR CULT: NORMAL

## 2022-03-04 ENCOUNTER — TELEPHONE (OUTPATIENT)
Dept: OBGYN CLINIC | Facility: CLINIC | Age: 37
End: 2022-03-04

## 2022-03-04 NOTE — TELEPHONE ENCOUNTER
----- Message from [facility], [address] sent at 3/4/2022  7:13 AM EST -----  Urine culture was normal  No infection   If still symptomatic, please schedule an appt with [address] [place] [address]

## 2022-05-06 ENCOUNTER — OFFICE VISIT (OUTPATIENT)
Dept: FAMILY MEDICINE CLINIC | Facility: CLINIC | Age: 37
End: 2022-05-06
Payer: COMMERCIAL

## 2022-05-06 VITALS
SYSTOLIC BLOOD PRESSURE: 126 MMHG | TEMPERATURE: 97 F | WEIGHT: 293 LBS | RESPIRATION RATE: 16 BRPM | HEART RATE: 94 BPM | BODY MASS INDEX: 45.99 KG/M2 | DIASTOLIC BLOOD PRESSURE: 80 MMHG | OXYGEN SATURATION: 100 % | HEIGHT: 67 IN

## 2022-05-06 DIAGNOSIS — M54.41 CHRONIC RIGHT-SIDED LOW BACK PAIN WITH RIGHT-SIDED SCIATICA: ICD-10-CM

## 2022-05-06 DIAGNOSIS — I10 ESSENTIAL HYPERTENSION: Primary | ICD-10-CM

## 2022-05-06 DIAGNOSIS — E66.01 MORBID OBESITY (HCC): ICD-10-CM

## 2022-05-06 DIAGNOSIS — R73.03 PREDIABETES: ICD-10-CM

## 2022-05-06 DIAGNOSIS — G89.29 CHRONIC RIGHT-SIDED LOW BACK PAIN WITH RIGHT-SIDED SCIATICA: ICD-10-CM

## 2022-05-06 DIAGNOSIS — H00.11 CHALAZION OF RIGHT UPPER EYELID: ICD-10-CM

## 2022-05-06 PROCEDURE — 3079F DIAST BP 80-89 MM HG: CPT

## 2022-05-06 PROCEDURE — 3725F SCREEN DEPRESSION PERFORMED: CPT

## 2022-05-06 PROCEDURE — 1036F TOBACCO NON-USER: CPT

## 2022-05-06 PROCEDURE — 3074F SYST BP LT 130 MM HG: CPT

## 2022-05-06 PROCEDURE — 99214 OFFICE O/P EST MOD 30 MIN: CPT

## 2022-05-06 PROCEDURE — 3008F BODY MASS INDEX DOCD: CPT

## 2022-05-06 RX ORDER — PHENTERMINE HYDROCHLORIDE 15 MG/1
15 CAPSULE ORAL EVERY MORNING
Qty: 30 CAPSULE | Refills: 0 | Status: SHIPPED | OUTPATIENT
Start: 2022-05-06 | End: 2022-06-03 | Stop reason: SDUPTHER

## 2022-05-06 RX ORDER — METHYLPREDNISOLONE 4 MG/1
TABLET ORAL
Qty: 21 EACH | Refills: 0 | Status: SHIPPED | OUTPATIENT
Start: 2022-05-06 | End: 2022-06-13 | Stop reason: ALTCHOICE

## 2022-05-06 RX ORDER — METFORMIN HYDROCHLORIDE 750 MG/1
750 TABLET, EXTENDED RELEASE ORAL
Qty: 90 TABLET | Refills: 1 | Status: SHIPPED | OUTPATIENT
Start: 2022-05-06

## 2022-05-06 RX ORDER — ERYTHROMYCIN 5 MG/G
0.5 OINTMENT OPHTHALMIC EVERY 8 HOURS SCHEDULED
Qty: 21 G | Refills: 0 | Status: SHIPPED | OUTPATIENT
Start: 2022-05-06 | End: 2022-05-13

## 2022-05-06 RX ORDER — CYCLOBENZAPRINE HCL 5 MG
5 TABLET ORAL 3 TIMES DAILY PRN
Qty: 60 TABLET | Refills: 0 | Status: SHIPPED | OUTPATIENT
Start: 2022-05-06 | End: 2022-08-09

## 2022-05-06 NOTE — ASSESSMENT & PLAN NOTE
Discussed dietary and exercise changes in order to improve patient's health and overall well-being  Patient is interested in starting medication in order to aid her weight loss  Will start on 50 mg as phentermine  Patient was educated that she is to discontinue the medicine if her blood pressure or HR goes up, she develops increase in headaches, chest pain or shortness of breath  Patient in agreement  She was also educated to keep track of her daily caloric intake on the vivian and do not exceed 1500 calories a day

## 2022-05-06 NOTE — PROGRESS NOTES
BMI Counseling: Body mass index is 47 28 kg/m²  The BMI is above normal  Nutrition recommendations include decreasing portion sizes, encouraging healthy choices of fruits and vegetables, decreasing fast food intake, consuming healthier snacks, limiting drinks that contain sugar, moderation in carbohydrate intake, increasing intake of lean protein, reducing intake of saturated and trans fat and reducing intake of cholesterol  Exercise recommendations include moderate physical activity 150 minutes/week and exercising 3-5 times per week  Rationale for BMI follow-up plan is due to patient being overweight or obese  Depression Screening and Follow-up Plan: Patient was screened for depression during today's encounter  They screened negative with a PHQ-2 score of 0  Assessment/Plan:       Problem List Items Addressed This Visit        Cardiovascular and Mediastinum    Essential hypertension - Primary     Well controlled, continue to take your daily medications         Relevant Orders    CBC and differential    Comprehensive metabolic panel    Hemoglobin A1C    Lipid panel       Other    BMI 45 0-49 9, adult ([address] )     Started on 15 mg of phentermine         Prediabetes     Continue to take metformin daily, A1c ordered         Relevant Medications    metFORMIN (GLUCOPHAGE-XR) 750 mg 24 hr tablet    Other Relevant Orders    CBC and differential    Comprehensive metabolic panel    Hemoglobin A1C    Lipid panel    Morbid obesity ([address] )     Discussed dietary and exercise changes in order to improve patient's health and overall well-being  Patient is interested in starting medication in order to aid her weight loss  Will start on 50 mg as phentermine  Patient was educated that she is to discontinue the medicine if her blood pressure or HR goes up, she develops increase in headaches, chest pain or shortness of breath  Patient in agreement    She was also educated to keep track of her daily caloric intake on the vivian and do not exceed 1500 calories a day  Relevant Medications    phentermine 15 MG capsule    Other Relevant Orders    CBC and differential    Comprehensive metabolic panel    Hemoglobin A1C    Lipid panel      Other Visit Diagnoses     Chalazion of right upper eyelid        Relevant Medications    erythromycin (ILOTYCIN) ophthalmic ointment    Other Relevant Orders    Ambulatory Referral to Ophthalmology    Chronic right-sided low back pain with right-sided sciatica        Will try Medrol Dosepak and Flexeril, consider referral to physical therapy  Consider starting yoga    Relevant Medications    methylPREDNISolone 4 MG tablet therapy pack    cyclobenzaprine (FLEXERIL) 5 mg tablet            Subjective:      Patient ID: Valdez Wilcox is a 39 y o  female  Patient presents to the office in order to establish with new provider  Has history of high blood pressure, prediabetes, chronic low back pain, and obesity  HTN- well controlled on lisinopril and amlodopine   BMI 47- will start on low dose of phentermine, patient was instructed to stop taking the medication if she develops increase in blood pressure and heart rate  If she develops worsening headaches, dizziness, palpitations, chest pain, shortness a breath she is to stop the medication as well and call the office  Chronic lower back pain with sciatica- started on steroid Dosepak and Flexeril, will discuss therapy      The following portions of the patient's history were reviewed and updated as appropriate:   Past Medical History:  She has a past medical history of Asthma, Diabetes mellitus ([address] ), Elective , H/O gestational diabetes mellitus, not currently pregnant (2019), Hypertension, Migraine, Morbid obesity with BMI of 45 0-49 9, adult ([address] ), Prediabetes (2021), and Varicella  ,  _______________________________________________________________________  Medical Problems:  does not have any pertinent problems on file ,  _______________________________________________________________________  Past Surgical History:   has a past surgical history that includes Induced   ,  _______________________________________________________________________  Family History:  family history includes Asthma in her brother, brother, brother, sister, sister, and sister; Breast cancer in her mother; Cancer in her mother; Diabetes in her father and paternal grandmother; Heart disease in her mother; Hypertension in her mother; Other in her mother; Stroke in her mother ,  _______________________________________________________________________  Social History:   reports that she has never smoked  She has never used smokeless tobacco  She reports that she does not drink alcohol and does not use drugs  ,  _______________________________________________________________________  Allergies:  has No Known Allergies     _______________________________________________________________________  Current Outpatient Medications   Medication Sig Dispense Refill    amLODIPine (NORVASC) 5 mg tablet Take 1 tablet (5 mg total) by mouth daily 30 tablet 3    levonorgestrel (MIRENA) 20 MCG/24HR IUD 1 each by Intrauterine route once      lisinopril (ZESTRIL) 10 mg tablet TAKE 1 TABLET BY MOUTH EVERY DAY 90 tablet 1    metFORMIN (GLUCOPHAGE-XR) 750 mg 24 hr tablet Take 1 tablet (750 mg total) by mouth daily with dinner 90 tablet 1    topiramate (TOPAMAX) 50 MG tablet Take 1 tablet at night 90 tablet 1    cyclobenzaprine (FLEXERIL) 5 mg tablet Take 1 tablet (5 mg total) by mouth 3 (three) times a day as needed for muscle spasms 60 tablet 0    erythromycin (ILOTYCIN) ophthalmic ointment Administer 0 5 inches to the right eye every 8 (eight) hours for 7 days 21 g 0    methylPREDNISolone 4 MG tablet therapy pack Use as directed on package 21 each 0    phentermine 15 MG capsule Take 1 capsule (15 mg total) by mouth every morning 30 capsule 0     No current facility-administered medications for this visit      _______________________________________________________________________  Review of Systems   Constitutional: Negative for chills and fever  HENT: Negative for ear pain and sore throat  Eyes: Positive for itching  Negative for pain and visual disturbance  Respiratory: Negative for cough and shortness of breath  Cardiovascular: Negative for chest pain and palpitations  Gastrointestinal: Negative for abdominal pain and vomiting  Genitourinary: Negative for dysuria and hematuria  Musculoskeletal: Positive for arthralgias and back pain  Skin: Negative for color change and rash  Neurological: Negative for seizures and syncope  Psychiatric/Behavioral: Negative for confusion and decreased concentration  All other systems reviewed and are negative  Objective:  Vitals:    05/06/22 0813   BP: 126/80   Pulse: 94   Resp: 16   Temp: (!) 97 °F (36 1 °C)   SpO2: 100%   Weight: 136 kg (299 lb 3 2 oz)   Height: 5' 6 7" (1 694 m)     Body mass index is 47 28 kg/m²  Physical Exam  Vitals and nursing note reviewed  Constitutional:       General: She is not in acute distress  Appearance: Normal appearance  She is obese  She is not ill-appearing  HENT:      Head: Normocephalic  Right Ear: External ear normal       Left Ear: External ear normal       Nose: Nose normal       Mouth/Throat:      Mouth: Mucous membranes are moist    Eyes:      General:         Right eye: Hordeolum present  Conjunctiva/sclera: Conjunctivae normal    Cardiovascular:      Rate and Rhythm: Normal rate and regular rhythm  Pulses: Normal pulses  Heart sounds: Normal heart sounds  Pulmonary:      Effort: Pulmonary effort is normal  No respiratory distress  Breath sounds: Normal breath sounds  No wheezing  Abdominal:      General: Bowel sounds are normal       Palpations: Abdomen is soft     Musculoskeletal:         General: No swelling or tenderness  Normal range of motion  Cervical back: Normal range of motion  No tenderness  Right lower leg: No edema  Left lower leg: No edema  Lymphadenopathy:      Cervical: No cervical adenopathy  Skin:     General: Skin is warm and dry  Neurological:      Mental Status: She is alert and oriented to person, place, and time  Psychiatric:         Mood and Affect: Mood normal          Behavior: Behavior normal          Thought Content:  Thought content normal          Judgment: Judgment normal

## 2022-06-03 ENCOUNTER — OFFICE VISIT (OUTPATIENT)
Dept: FAMILY MEDICINE CLINIC | Facility: CLINIC | Age: 37
End: 2022-06-03
Payer: COMMERCIAL

## 2022-06-03 VITALS
HEART RATE: 100 BPM | BODY MASS INDEX: 45.83 KG/M2 | WEIGHT: 292 LBS | OXYGEN SATURATION: 97 % | SYSTOLIC BLOOD PRESSURE: 130 MMHG | TEMPERATURE: 98.3 F | RESPIRATION RATE: 14 BRPM | DIASTOLIC BLOOD PRESSURE: 88 MMHG | HEIGHT: 67 IN

## 2022-06-03 DIAGNOSIS — G43.809 OTHER MIGRAINE WITHOUT STATUS MIGRAINOSUS, NOT INTRACTABLE: ICD-10-CM

## 2022-06-03 DIAGNOSIS — I10 ESSENTIAL HYPERTENSION: ICD-10-CM

## 2022-06-03 DIAGNOSIS — J02.0 PHARYNGITIS DUE TO STREPTOCOCCUS SPECIES: Primary | ICD-10-CM

## 2022-06-03 DIAGNOSIS — R73.03 PREDIABETES: ICD-10-CM

## 2022-06-03 DIAGNOSIS — E66.01 MORBID OBESITY (HCC): ICD-10-CM

## 2022-06-03 LAB — S PYO AG THROAT QL: POSITIVE

## 2022-06-03 PROCEDURE — 3079F DIAST BP 80-89 MM HG: CPT

## 2022-06-03 PROCEDURE — 99214 OFFICE O/P EST MOD 30 MIN: CPT

## 2022-06-03 PROCEDURE — 87880 STREP A ASSAY W/OPTIC: CPT

## 2022-06-03 PROCEDURE — 3075F SYST BP GE 130 - 139MM HG: CPT

## 2022-06-03 RX ORDER — PHENTERMINE HYDROCHLORIDE 30 MG/1
30 CAPSULE ORAL EVERY MORNING
Qty: 30 CAPSULE | Refills: 0 | Status: SHIPPED | OUTPATIENT
Start: 2022-06-03 | End: 2022-07-11 | Stop reason: SDUPTHER

## 2022-06-03 RX ORDER — AMOXICILLIN 500 MG/1
500 TABLET, FILM COATED ORAL 2 TIMES DAILY
Qty: 20 TABLET | Refills: 0 | Status: SHIPPED | OUTPATIENT
Start: 2022-06-03 | End: 2022-06-13

## 2022-06-03 NOTE — PATIENT INSTRUCTIONS
Pharyngitis   AMBULATORY CARE:   Pharyngitis , or sore throat, is inflammation of the tissues and structures in your pharynx (throat)  Pharyngitis is most often caused by bacteria  It may also be caused by a cold or flu virus  Other causes include smoking, allergies, or acid reflux  Signs and symptoms that may occur with pharyngitis:   Sore throat or pain when you swallow    Fever, chills, and body aches    Hoarse or raspy voice    Cough, runny or stuffy nose, itchy or watery eyes    Headache    Upset stomach and loss of appetite    Mild neck stiffness    Swollen glands that feel like hard lumps when you touch your neck    White and yellow pus-filled blisters in the back of your throat    Call 911 for any of the following: You have trouble breathing or swallowing because your throat is swollen or sore  Seek care immediately if:   You are drooling because it hurts too much to swallow  Your fever is higher than 102? F (39?C) or lasts longer than 3 days  You are confused  You taste blood in your throat  Contact your healthcare provider if:   Your throat pain gets worse  You have a painful lump in your throat that does not go away after 5 days  Your symptoms do not improve after 5 days  You have questions or concerns about your condition or care  Treatment for pharyngitis:  Viral pharyngitis will go away on its own without treatment  Your sore throat should start to feel better in 3 to 5 days for both viral and bacterial infections  You may need any of the following:  Antibiotics  treat a bacterial infection  NSAIDs , such as ibuprofen, help decrease swelling, pain, and fever  NSAIDs can cause stomach bleeding or kidney problems in certain people  If you take blood thinner medicine, always ask your healthcare provider if NSAIDs are safe for you  Always read the medicine label and follow directions  Acetaminophen  decreases pain and fever  It is available without a doctor's order  Ask how much to take and how often to take it  Follow directions  Acetaminophen can cause liver damage if not taken correctly  Manage your symptoms:   Gargle salt water  Mix ¼ teaspoon salt in an 8 ounce glass of warm water and gargle  This may help decrease swelling in your throat  Drink liquids as directed  You may need to drink more liquids than usual  Liquids may help soothe your throat and prevent dehydration  Ask how much liquid to drink each day and which liquids are best for you  Use a cool-steam humidifier  to help moisten the air in your room and calm your cough  Soothe your throat  with cough drops, ice, soft foods, or popsicles  Prevent the spread of pharyngitis:  Cover your mouth and nose when you cough or sneeze  Do not share food or drinks  Wash your hands often  Use soap and water  If soap and water are unavailable, use an alcohol based hand   Follow up with your doctor as directed:  Write down your questions so you remember to ask them during your visits  © Copyright [employer] 2022 Information is for End User's use only and may not be sold, redistributed or otherwise used for commercial purposes  All illustrations and images included in CareNotes® are the copyrighted property of A D A M , Inc  or [facility] Darden   The above information is an  only  It is not intended as medical advice for individual conditions or treatments  Talk to your doctor, nurse or pharmacist before following any medical regimen to see if it is safe and effective for you

## 2022-06-03 NOTE — PROGRESS NOTES
Assessment/Plan:    Problem List Items Addressed This Visit        Cardiovascular and Mediastinum    Migraine     Stable, continue to take Topamax daily           Essential hypertension     At goal today              Other    BMI 45 0-49 9, adult ([facility] )     Recent weight loss phentermine  Continue with 1200 calorie diet and daily walking  Relevant Medications    phentermine 30 MG capsule    Prediabetes     Continue to take metformin           Morbid obesity ([facility] [facility] )    Relevant Medications    phentermine 30 MG capsule      Other Visit Diagnoses     Pharyngitis due to Streptococcus species    -  Primary    Sore throat onset 3 days ago, no fevers    Relevant Medications    amoxicillin (AMOXIL) 500 MG tablet    Other Relevant Orders    POCT rapid strepA (Completed)            Subjective:      Patient ID: Janene Arriaga is a 39 y o  female  Patient reports to the office for routine follow-up her phentermine dosing  She was taking 15 mg of phentermine daily and was doing well on it  She is denying any headaches, palpitations, chest pain or shortness of breath  She states that she is doing healthy diet and daily walking  Recent weight loss of 7 lb  Will go up to 30 mg a phentermine a day  Discussed possibility of creasing blood pressure medications  Patient is also reporting sore throat that started about 3 days ago  Sore Throat   The current episode started in the past 7 days  The problem has been gradually improving  There has been no fever  Associated symptoms include a hoarse voice  Pertinent negatives include no abdominal pain, congestion, coughing, diarrhea, drooling, ear discharge, ear pain, headaches, plugged ear sensation, neck pain, shortness of breath, stridor, swollen glands, trouble swallowing or vomiting  She has had no exposure to strep or mono  She has tried nothing for the symptoms  The treatment provided mild relief         The following portions of the patient's history were reviewed and updated as appropriate:   Past Medical History:  She has a past medical history of Asthma, Diabetes mellitus ([facility] ), Elective , H/O gestational diabetes mellitus, not currently pregnant (2019), Hypertension, Migraine, Morbid obesity with BMI of 45 0-49 9, adult ([facility] [address] ), Prediabetes (2021), and Varicella  ,  _______________________________________________________________________  Medical Problems:  does not have any pertinent problems on file ,  _______________________________________________________________________  Past Surgical History:   has a past surgical history that includes Induced   ,  _______________________________________________________________________  Family History:  family history includes Asthma in her brother, brother, brother, sister, sister, and sister; Breast cancer in her mother; Cancer in her mother; Diabetes in her father and paternal grandmother; Heart disease in her mother; Hypertension in her mother; Other in her mother; Stroke in her mother ,  _______________________________________________________________________  Social History:   reports that she has never smoked  She has never used smokeless tobacco  She reports that she does not drink alcohol and does not use drugs  ,  _______________________________________________________________________  Allergies:  has No Known Allergies     _______________________________________________________________________  Current Outpatient Medications   Medication Sig Dispense Refill    amLODIPine (NORVASC) 5 mg tablet Take 1 tablet (5 mg total) by mouth daily 30 tablet 3    amoxicillin (AMOXIL) 500 MG tablet Take 1 tablet (500 mg total) by mouth 2 (two) times a day for 10 days 20 tablet 0    levonorgestrel (MIRENA) 20 MCG/24HR IUD 1 each by Intrauterine route once      lisinopril (ZESTRIL) 10 mg tablet TAKE 1 TABLET BY MOUTH EVERY DAY 90 tablet 1    metFORMIN (GLUCOPHAGE-XR) 750 mg 24 hr tablet Take 1 tablet (750 mg total) by mouth daily with dinner 90 tablet 1    phentermine 30 MG capsule Take 1 capsule (30 mg total) by mouth every morning 30 capsule 0    topiramate (TOPAMAX) 50 MG tablet Take 1 tablet at night 90 tablet 1    cyclobenzaprine (FLEXERIL) 5 mg tablet Take 1 tablet (5 mg total) by mouth 3 (three) times a day as needed for muscle spasms 60 tablet 0    methylPREDNISolone 4 MG tablet therapy pack Use as directed on package 21 each 0     No current facility-administered medications for this visit      _______________________________________________________________________  Review of Systems   Constitutional: Negative for chills and fever  HENT: Positive for hoarse voice  Negative for congestion, drooling, ear discharge, ear pain, sore throat and trouble swallowing  Eyes: Negative for pain and visual disturbance  Respiratory: Negative for cough, shortness of breath and stridor  Cardiovascular: Negative for chest pain and palpitations  Gastrointestinal: Negative for abdominal pain, diarrhea and vomiting  Genitourinary: Negative for dysuria and hematuria  Musculoskeletal: Negative for arthralgias, back pain and neck pain  Skin: Negative for color change and rash  Neurological: Negative for seizures, syncope and headaches  All other systems reviewed and are negative  Objective:  Vitals:    06/03/22 0825   BP: 130/88   Pulse: 100   Resp: 14   Temp: 98 3 °F (36 8 °C)   SpO2: 97%   Weight: 132 kg (292 lb)   Height: 5' 6 7" (1 694 m)     Body mass index is 46 15 kg/m²  Physical Exam  Vitals and nursing note reviewed  Constitutional:       General: She is not in acute distress  Appearance: Normal appearance  She is obese  She is not ill-appearing  HENT:      Head: Normocephalic        Right Ear: External ear normal       Left Ear: External ear normal       Nose: Nose normal       Mouth/Throat:      Mouth: Mucous membranes are moist    Eyes:      Conjunctiva/sclera: Conjunctivae normal    Cardiovascular:      Rate and Rhythm: Normal rate and regular rhythm  Pulses: Normal pulses  Heart sounds: Normal heart sounds  Pulmonary:      Effort: Pulmonary effort is normal  No respiratory distress  Breath sounds: Normal breath sounds  No wheezing  Abdominal:      General: Bowel sounds are normal       Palpations: Abdomen is soft  Musculoskeletal:         General: No swelling or tenderness  Normal range of motion  Cervical back: Normal range of motion  No tenderness  Right lower leg: No edema  Left lower leg: No edema  Lymphadenopathy:      Cervical: No cervical adenopathy  Skin:     General: Skin is warm and dry  Neurological:      Mental Status: She is alert and oriented to person, place, and time  Psychiatric:         Mood and Affect: Mood normal          Behavior: Behavior normal          Thought Content:  Thought content normal          Judgment: Judgment normal

## 2022-06-13 ENCOUNTER — ANNUAL EXAM (OUTPATIENT)
Dept: OBGYN CLINIC | Facility: CLINIC | Age: 37
End: 2022-06-13
Payer: COMMERCIAL

## 2022-06-13 VITALS
BODY MASS INDEX: 46.28 KG/M2 | HEIGHT: 66 IN | SYSTOLIC BLOOD PRESSURE: 128 MMHG | WEIGHT: 288 LBS | DIASTOLIC BLOOD PRESSURE: 86 MMHG

## 2022-06-13 DIAGNOSIS — Z11.3 SCREENING FOR STD (SEXUALLY TRANSMITTED DISEASE): ICD-10-CM

## 2022-06-13 DIAGNOSIS — N89.8 VAGINAL ODOR: ICD-10-CM

## 2022-06-13 DIAGNOSIS — Z01.411 ENCOUNTER FOR GYNECOLOGICAL EXAMINATION WITH ABNORMAL FINDING: Primary | ICD-10-CM

## 2022-06-13 DIAGNOSIS — Z80.3 FAMILY HISTORY OF BREAST CANCER IN FIRST DEGREE RELATIVE: ICD-10-CM

## 2022-06-13 PROBLEM — N92.0 MENORRHAGIA WITH REGULAR CYCLE: Status: RESOLVED | Noted: 2018-07-31 | Resolved: 2022-06-13

## 2022-06-13 PROBLEM — R63.5 ABNORMAL WEIGHT GAIN: Status: RESOLVED | Noted: 2021-09-13 | Resolved: 2022-06-13

## 2022-06-13 PROCEDURE — 99395 PREV VISIT EST AGE 18-39: CPT | Performed by: NURSE PRACTITIONER

## 2022-06-13 PROCEDURE — 87661 TRICHOMONAS VAGINALIS AMPLIF: CPT | Performed by: NURSE PRACTITIONER

## 2022-06-13 PROCEDURE — 1036F TOBACCO NON-USER: CPT | Performed by: NURSE PRACTITIONER

## 2022-06-13 PROCEDURE — 3008F BODY MASS INDEX DOCD: CPT | Performed by: NURSE PRACTITIONER

## 2022-06-13 PROCEDURE — 87591 N.GONORRHOEAE DNA AMP PROB: CPT | Performed by: NURSE PRACTITIONER

## 2022-06-13 PROCEDURE — 87491 CHLMYD TRACH DNA AMP PROBE: CPT | Performed by: NURSE PRACTITIONER

## 2022-06-13 RX ORDER — METRONIDAZOLE 7.5 MG/G
1 GEL VAGINAL
Qty: 45 G | Refills: 0 | Status: SHIPPED | OUTPATIENT
Start: 2022-06-13 | End: 2022-06-18

## 2022-06-13 NOTE — PATIENT INSTRUCTIONS
Breast Self Exam for Women   AMBULATORY CARE:   A breast self-exam (BSE)  is a way to check your breasts for lumps and other changes  Regular BSEs can help you know how your breasts normally look and feel  Most breast lumps or changes are not cancer, but you should always have them checked by a healthcare provider  Why you should do a BSE:  Breast cancer is the most common type of cancer in women  Even if you have mammograms, you may still want to do a BSE regularly  If you know how your breasts normally feel and look, it may help you know when to contact your healthcare provider  Mammograms can miss some cancers  You may find a lump during a BSE that did not show up on a mammogram   When you should do a BSE:  If you have periods, you may want to do your BSE 1 week after your period ends  This is the time when your breasts may be the least swollen, lumpy, or tender  You can do regular BSEs even if you are breastfeeding or have breast implants  Call your doctor if:   You find any lumps or changes in your breasts  You have breast pain or fluid coming from your nipples  You have questions or concerns about your condition or care  How to do a BSE:       Look at your breasts in a mirror  Look at the size and shape of each breast and nipple  Check for swelling, lumps, dimpling, scaly skin, or other skin changes  Look for nipple changes, such as a nipple that is painful or beginning to pull inward  Gently squeeze both nipples and check to see if fluid (that is not breast milk) comes out of them  If you find any of these or other breast changes, contact your healthcare provider  Check your breasts while you sit or  the following 3 positions:    [place] your arms down at your sides  Raise your hands and join them behind your head  Put firm pressure with your hands on your hips  Bend slightly forward while you look at your breasts in the mirror  Lie down and feel your breasts    When you lie down, your breast tissue spreads out evenly over your chest  This makes it easier for you to feel for lumps and anything that may not be normal for your breasts  Do a BSE on one breast at a time  Place a small pillow or towel under your left shoulder  Put your left arm behind your head  Use the 3 middle fingers of your right hand  Use your fingertip pads, on the top of your fingers  Your fingertip pad is the most sensitive part of your finger  Use small circles to feel your breast tissue  Use your fingertip pads to make dime-sized, overlapping circles on your breast and armpits  Use light, medium, and firm pressure  First, press lightly  Second, press with medium pressure to feel a little deeper into the breast  Last, use firm pressure to feel deep within your breast     Examine your entire breast area  Examine the breast area from above the breast to below the breast where you feel only ribs  Make small circles with your fingertips, starting in the middle of your armpit  Make circles going up and down the breast area  Continue toward your breast and all the way across it  Examine the area from your armpit all the way over to the middle of your chest (breastbone)  Stop at the middle of your chest     Move the pillow or towel to your right shoulder, and put your right arm behind your head  Use the 3 fingertip pads of your left hand, and repeat the above steps to do a BSE on your right breast     What else you can do to check for breast problems or cancer:  Talk to your healthcare provider about mammograms  A mammogram is an x-ray of your breasts to screen for breast cancer or other problems  Your provider can tell you the benefits and risks of mammograms  The first mammogram is usually at age 39 or 48  Your provider may recommend you start at 36 or younger if your risk for breast cancer is high  Mammograms usually continue every 1 to 2 years until age 76         Follow up with your doctor as directed:  Write down your questions so you remember to ask them during your visits  © Copyright [employer] 2022 Information is for End User's use only and may not be sold, redistributed or otherwise used for commercial purposes  All illustrations and images included in CareNotes® are the copyrighted property of A D A M , Inc  or Azeem Ward  The above information is an  only  It is not intended as medical advice for individual conditions or treatments  Talk to your doctor, nurse or pharmacist before following any medical regimen to see if it is safe and effective for you

## 2022-06-13 NOTE — PROGRESS NOTES
Patient presents for: Karmen Grigsby    Menarche- 10  Last Pap Smear- 06/08/2021  Hx of abnormal paps- NO  Birth control-MIRENA    Mammogram- Not on file  DXA-NONE  Colonoscopy- NONE    Smoking status- NONE   Last sexual activity- YES ABOUT A WEEK AGO   Family history of uterine, ovarian, cervical or breast cancer-  Mom with breast cancer  Concerns-  LMP 04/23/22 HAS NOT GOTTEN A PERIOD SINCE, BUT IS HAVING CRAMPING  HAS ALWAYS GOTTEN A PERIOD   MIRENA IN FOR ABOUT 4 YEARS

## 2022-06-13 NOTE — PROGRESS NOTES
Diagnoses and all orders for this visit:    Encounter for gynecological examination with abnormal finding    Family history of breast cancer in first degree relative  -     Mammo screening bilateral w 3d & cad; Future    Vaginal odor  -     metroNIDAZOLE (METROGEL) 0 75 % vaginal gel; Insert 1 application into the vagina daily at bedtime for 5 days    Screening for STD (sexually transmitted disease)          -     GC/CT/Trich ordered today      Calcium/vit d inclusion in the diet discussed, call with any issues, SBE reinforced, all concerns addressed  Pleasant 39 y o  premenopausal female here for annual exam  She denies any issues with bleeding or her menses  Reports recent amenorrhea with her Mirena placed 2019  Denies history of abnormal pap smears  Last Pap and HPV tests were done on 2021 neg/neg, NO pap done today  She has vaginal issues again with odor and discharge, denies itching  Hx of GDM  Denies pelvic pain  Denies dyspareunia  Denies any issues with her BCM  Sexually active without any concerns  Monogamous x years but she did have gonorrhea last yr  Agrees to STD testing today  Fam Hx breast cancer with her mom at age 37, passed away at 62  Baseline mammo ordered      Past Medical History:   Diagnosis Date    Asthma     childhood    [facility] )     gestational    Elective      Resolved 2016     H/O gestational diabetes mellitus, not currently pregnant 2019    Hypertension     Menorrhagia with regular cycle 2018    Migraine     Morbid obesity with BMI of 45 0-49 9, adult ([facility] [address] )     Prediabetes 2021    Varicella     childhood     Past Surgical History:   Procedure Laterality Date    INDUCED        Family History   Problem Relation Age of Onset    Cancer Mother     Heart disease Mother     Stroke Mother     Other Mother     Hypertension Mother     Breast cancer Mother     Diabetes Father     Asthma Sister     Asthma Brother  Diabetes Paternal Grandmother     Asthma Brother     Asthma Brother     Asthma Sister     Asthma Sister      Social History     Tobacco Use    Smoking status: Never Smoker    Smokeless tobacco: Never Used   Vaping Use    Vaping Use: Never used   Substance Use Topics    Alcohol use: No    Drug use: No       Current Outpatient Medications:     amLODIPine (NORVASC) 5 mg tablet, Take 1 tablet (5 mg total) by mouth daily, Disp: 30 tablet, Rfl: 3    amoxicillin (AMOXIL) 500 MG tablet, Take 1 tablet (500 mg total) by mouth 2 (two) times a day for 10 days, Disp: 20 tablet, Rfl: 0    cyclobenzaprine (FLEXERIL) 5 mg tablet, Take 1 tablet (5 mg total) by mouth 3 (three) times a day as needed for muscle spasms, Disp: 60 tablet, Rfl: 0    levonorgestrel (MIRENA) 20 MCG/24HR IUD, 1 each by Intrauterine route once, Disp: , Rfl:     lisinopril (ZESTRIL) 10 mg tablet, TAKE 1 TABLET BY MOUTH EVERY DAY, Disp: 90 tablet, Rfl: 1    metFORMIN (GLUCOPHAGE-XR) 750 mg 24 hr tablet, Take 1 tablet (750 mg total) by mouth daily with dinner, Disp: 90 tablet, Rfl: 1    metroNIDAZOLE (METROGEL) 0 75 % vaginal gel, Insert 1 application into the vagina daily at bedtime for 5 days, Disp: 45 g, Rfl: 0    phentermine 30 MG capsule, Take 1 capsule (30 mg total) by mouth every morning, Disp: 30 capsule, Rfl: 0    topiramate (TOPAMAX) 50 MG tablet, Take 1 tablet at night, Disp: 90 tablet, Rfl: 1  Patient Active Problem List    Diagnosis Date Noted    Prediabetes 2021    Morbid obesity ([facility] ) 2021    Gonorrhea 2021    Hordeolum externum of right upper eyelid 2021    Essential hypertension 2019    [facility] 45 0-49 9, [facility]    H/O gestational diabetes mellitus, not currently pregnant 2019    Migraine 05/10/2019       No Known Allergies    OB History    Para Term  AB Living   5 2 2   3 2   SAB IAB Ectopic Multiple Live Births         0 2      # Outcome Date GA Lbr Jay Jay/2nd Weight Sex Delivery Anes PTL Lv   5 Term 10/21/16 38w6d 07:10 / 00:08 3500 g (7 lb 11 5 oz) F Vag-Spont EPI N JENNY   4 Term 01/13/15 38w4d  3459 g (7 lb 10 oz) M Vag-Spont EPI  JENNY   3 AB            2 AB            1 AB              Son and daughter 9 and 5  Going to [facility] with the kids this summer    Vitals:    06/13/22 1127   BP: 128/86   BP Location: Left arm   Patient Position: Sitting   Cuff Size: Standard   Weight: 131 kg (288 lb)   Height: 5' 6" (1 676 m)     Body mass index is 46 48 kg/m²  Review of Systems   Constitutional: Negative for chills, fatigue, fever and unexpected weight change  Respiratory: Negative for shortness of breath  Gastrointestinal: Negative for anal bleeding, blood in stool, constipation and diarrhea  Genitourinary: Negative for difficulty urinating, dysuria and hematuria  Physical Exam   Constitutional: She appears well-developed and well-nourished  No distress  HENT: atraumatic  Head: Normocephalic  Neck: Normal range of motion  Neck supple  Pulmonary: Effort normal   Breasts: bilateral without masses, skin changes or nipple discharge  Bilaterally soft and warm to touch  No areas of erythema or pain  Abdominal: Soft  Pelvic exam was performed with patient supine  No labial fusion  There is no rash, tenderness, lesion or injury on the right labia  There is no rash, tenderness, lesion or injury on the left labia  Urethral meatus does not show any tenderness, inflammation or discharge  Palpation of midline bladder without pain or discomfort  Uterus is not deviated, not enlarged, not fixed and not tender  Cervix exhibits no motion tenderness, no discharge and no friability  Right adnexum displays no mass, no tenderness and no fullness  Left adnexum displays no mass, no tenderness and no fullness  No erythema or tenderness in the vagina  No foreign body in the vagina  No signs of injury around the vagina  No vaginal discharge found   No signs of injury around the vagina or anus  Perineum without lesions, signs of injury, erythema or swelling  IUD strings seen from OS  Lymphadenopathy:        Right: No inguinal adenopathy present  Left: No inguinal adenopathy present

## 2022-06-15 LAB
C TRACH DNA SPEC QL NAA+PROBE: NEGATIVE
N GONORRHOEA DNA SPEC QL NAA+PROBE: NEGATIVE

## 2022-06-17 ENCOUNTER — TELEPHONE (OUTPATIENT)
Dept: OBGYN CLINIC | Facility: CLINIC | Age: 37
End: 2022-06-17

## 2022-06-17 DIAGNOSIS — A59.01 TRICHOMONAS VAGINITIS: Primary | ICD-10-CM

## 2022-06-17 LAB — T VAGINALIS RRNA SPEC QL NAA+PROBE: POSITIVE

## 2022-06-17 RX ORDER — METRONIDAZOLE 500 MG/1
2000 TABLET ORAL ONCE
Qty: 4 TABLET | Refills: 0 | Status: SHIPPED | OUTPATIENT
Start: 2022-06-17 | End: 2022-06-17

## 2022-06-17 NOTE — TELEPHONE ENCOUNTER
----- Message from [facility] sent at 6/17/2022 12:12 PM EDT -----  Regarding: Question regarding TRICHOMONAS VAGINALIS, GRECIA  Hey Matilda whats this mean!

## 2022-07-11 ENCOUNTER — OFFICE VISIT (OUTPATIENT)
Dept: FAMILY MEDICINE CLINIC | Facility: CLINIC | Age: 37
End: 2022-07-11
Payer: COMMERCIAL

## 2022-07-11 VITALS
HEART RATE: 92 BPM | HEIGHT: 66 IN | OXYGEN SATURATION: 100 % | DIASTOLIC BLOOD PRESSURE: 87 MMHG | WEIGHT: 273 LBS | TEMPERATURE: 98.3 F | SYSTOLIC BLOOD PRESSURE: 124 MMHG | BODY MASS INDEX: 43.87 KG/M2

## 2022-07-11 DIAGNOSIS — E66.01 MORBID OBESITY (HCC): ICD-10-CM

## 2022-07-11 DIAGNOSIS — R63.5 WEIGHT GAIN: Primary | ICD-10-CM

## 2022-07-11 DIAGNOSIS — I10 ESSENTIAL HYPERTENSION: ICD-10-CM

## 2022-07-11 DIAGNOSIS — R73.03 PREDIABETES: ICD-10-CM

## 2022-07-11 PROCEDURE — 99214 OFFICE O/P EST MOD 30 MIN: CPT

## 2022-07-11 RX ORDER — PHENTERMINE HYDROCHLORIDE 30 MG/1
30 CAPSULE ORAL EVERY MORNING
Qty: 30 CAPSULE | Refills: 0 | Status: SHIPPED | OUTPATIENT
Start: 2022-07-11 | End: 2022-08-09 | Stop reason: SDUPTHER

## 2022-07-11 NOTE — ASSESSMENT & PLAN NOTE
19 lb weight loss, continue to track calories, introduce exercise    Continue to take 30 mg of phentermine daily

## 2022-07-11 NOTE — PROGRESS NOTES
Assessment/Plan:      Problem List Items Addressed This Visit        Cardiovascular and Mediastinum    Essential hypertension     Controlled, continue current medication regiment              Other    BMI 45 0-49 9, adult ([facility])    Relevant Medications    phentermine 30 MG capsule    Prediabetes    Morbid obesity ([address] )     19 lb weight loss, continue to track calories, introduce exercise  Continue to take 30 mg of phentermine daily           Relevant Medications    phentermine 30 MG capsule    Weight gain - Primary     19 lb weight loss  Continue to take 30 mg of phentermine daily           Relevant Medications    phentermine 30 MG capsule            Subjective:      Patient ID: Andrea Lan is a 39 y o  female  Patient presents to the office for weight loss follow-up  She has been taking 30 mg of phentermine  Recent weight loss of 19 lb  She is denying any headaches, dizziness, chest pain or palpitations  The following portions of the patient's history were reviewed and updated as appropriate:   Past Medical History:  She has a past medical history of Asthma, Diabetes mellitus ([address] ), Elective , H/O gestational diabetes mellitus, not currently pregnant (2019), Hypertension, Menorrhagia with regular cycle (2018), Migraine, Morbid obesity with BMI of 45 0-49 9, adult ([address] ), Prediabetes (2021), and Varicella  ,  _______________________________________________________________________  Medical Problems:  does not have any pertinent problems on file ,  _______________________________________________________________________  Past Surgical History:   has a past surgical history that includes Induced   ,  _______________________________________________________________________  Family History:  family history includes Asthma in her brother, brother, brother, sister, sister, and sister; Breast cancer in her mother; Cancer in her mother; Diabetes in her father and paternal grandmother; Heart disease in her mother; Hypertension in her mother; Other in her mother; Stroke in her mother ,  _______________________________________________________________________  Social History:   reports that she has never smoked  She has never used smokeless tobacco  She reports that she does not drink alcohol and does not use drugs  ,  _______________________________________________________________________  Allergies:  has No Known Allergies     _______________________________________________________________________  Current Outpatient Medications   Medication Sig Dispense Refill    amLODIPine (NORVASC) 5 mg tablet Take 1 tablet (5 mg total) by mouth daily 30 tablet 3    cyclobenzaprine (FLEXERIL) 5 mg tablet Take 1 tablet (5 mg total) by mouth 3 (three) times a day as needed for muscle spasms 60 tablet 0    levonorgestrel (MIRENA) 20 MCG/24HR IUD 1 each by Intrauterine route once      lisinopril (ZESTRIL) 10 mg tablet TAKE 1 TABLET BY MOUTH EVERY DAY 90 tablet 1    metFORMIN (GLUCOPHAGE-XR) 750 mg 24 hr tablet Take 1 tablet (750 mg total) by mouth daily with dinner 90 tablet 1    phentermine 30 MG capsule Take 1 capsule (30 mg total) by mouth every morning 30 capsule 0    topiramate (TOPAMAX) 50 MG tablet Take 1 tablet at night 90 tablet 1     No current facility-administered medications for this visit      _______________________________________________________________________  Review of Systems   Constitutional: Negative for chills and fever  HENT: Negative for ear pain and sore throat  Eyes: Negative for pain and visual disturbance  Respiratory: Negative for cough and shortness of breath  Cardiovascular: Negative for chest pain and palpitations  Gastrointestinal: Negative for abdominal pain and vomiting  Genitourinary: Negative for dysuria and hematuria  Musculoskeletal: Negative for arthralgias and back pain  Skin: Negative for color change and rash     Neurological: Negative for seizures and syncope  All other systems reviewed and are negative  Objective:  Vitals:    07/11/22 1600   BP: 124/87   Pulse: 92   Temp: 98 3 °F (36 8 °C)   SpO2: 100%   Weight: 124 kg (273 lb)   Height: 5' 6" (1 676 m)     Body mass index is 44 06 kg/m²  Physical Exam  Vitals and nursing note reviewed  Constitutional:       General: She is not in acute distress  Appearance: Normal appearance  She is not ill-appearing  HENT:      Head: Normocephalic  Right Ear: External ear normal       Left Ear: External ear normal       Nose: Nose normal       Mouth/Throat:      Mouth: Mucous membranes are moist    Eyes:      Conjunctiva/sclera: Conjunctivae normal    Cardiovascular:      Rate and Rhythm: Normal rate and regular rhythm  Pulses: Normal pulses  Heart sounds: Normal heart sounds  Pulmonary:      Effort: Pulmonary effort is normal  No respiratory distress  Breath sounds: Normal breath sounds  No wheezing  Abdominal:      General: Bowel sounds are normal       Palpations: Abdomen is soft  Musculoskeletal:         General: No swelling or tenderness  Normal range of motion  Cervical back: Normal range of motion  No tenderness  Right lower leg: No edema  Left lower leg: No edema  Lymphadenopathy:      Cervical: No cervical adenopathy  Skin:     General: Skin is warm and dry  Neurological:      Mental Status: She is alert and oriented to person, place, and time  Psychiatric:         Mood and Affect: Mood normal          Behavior: Behavior normal          Thought Content:  Thought content normal          Judgment: Judgment normal

## 2022-07-12 ENCOUNTER — TELEPHONE (OUTPATIENT)
Dept: OBGYN CLINIC | Facility: CLINIC | Age: 37
End: 2022-07-12

## 2022-07-12 NOTE — TELEPHONE ENCOUNTER
Was her partner treated? Is she sexually active since treatment? She does not have to wait 3 months if she wants to recheck sooner

## 2022-07-12 NOTE — TELEPHONE ENCOUNTER
----- Message from [facility] sent at 7/12/2022 12:11 PM EDT -----  Regarding: Follow up to the trich vaginitis   Anibal Grey so its been abt three weeks now since I finish the set of 4 pills and I had the gel as well but I feel like the smell isnt going away completely! Its nowhere as strong as it used to be but its there especially when I urinate  Is there anything else that I can take for this? Also I look at my pass tests and and I see back in feb there were traces found but I wasnt treated then until I came for my physical in June! Sorry to be a pain I just dont know what else to do and I finally got my period two months later

## 2022-07-14 ENCOUNTER — OFFICE VISIT (OUTPATIENT)
Dept: OBGYN CLINIC | Age: 37
End: 2022-07-14
Payer: COMMERCIAL

## 2022-07-14 VITALS
HEIGHT: 66 IN | SYSTOLIC BLOOD PRESSURE: 136 MMHG | WEIGHT: 274.4 LBS | DIASTOLIC BLOOD PRESSURE: 84 MMHG | BODY MASS INDEX: 44.1 KG/M2

## 2022-07-14 DIAGNOSIS — B96.89 BV (BACTERIAL VAGINOSIS): Primary | ICD-10-CM

## 2022-07-14 DIAGNOSIS — N89.8 VAGINAL DISCHARGE: ICD-10-CM

## 2022-07-14 DIAGNOSIS — N76.0 BV (BACTERIAL VAGINOSIS): Primary | ICD-10-CM

## 2022-07-14 DIAGNOSIS — Z11.3 SCREENING EXAMINATION FOR STD (SEXUALLY TRANSMITTED DISEASE): ICD-10-CM

## 2022-07-14 DIAGNOSIS — N89.8 VAGINAL ODOR: ICD-10-CM

## 2022-07-14 LAB
BV WHIFF TEST VAG QL: NORMAL
CLUE CELLS SPEC QL WET PREP: NORMAL
PH SMN: 4 [PH]
T VAGINALIS VAG QL WET PREP: NORMAL
YEAST VAG QL WET PREP: NORMAL

## 2022-07-14 PROCEDURE — 87591 N.GONORRHOEAE DNA AMP PROB: CPT

## 2022-07-14 PROCEDURE — 87210 SMEAR WET MOUNT SALINE/INK: CPT

## 2022-07-14 PROCEDURE — 87491 CHLMYD TRACH DNA AMP PROBE: CPT

## 2022-07-14 PROCEDURE — 99213 OFFICE O/P EST LOW 20 MIN: CPT

## 2022-07-14 RX ORDER — METRONIDAZOLE 500 MG/1
500 TABLET ORAL EVERY 12 HOURS SCHEDULED
Qty: 14 TABLET | Refills: 0 | Status: SHIPPED | OUTPATIENT
Start: 2022-07-14 | End: 2022-07-21

## 2022-07-14 RX ORDER — AMOXICILLIN 500 MG/1
CAPSULE ORAL
COMMUNITY
Start: 2022-06-03 | End: 2022-08-09

## 2022-07-14 RX ORDER — METRONIDAZOLE 500 MG/1
TABLET ORAL
COMMUNITY
Start: 2022-06-17 | End: 2022-07-14 | Stop reason: ALTCHOICE

## 2022-07-14 NOTE — PATIENT INSTRUCTIONS
Bacterial Vaginosis   AMBULATORY CARE:   Bacterial vaginosis  is an infection in the vagina  It may cause vaginitis (irritation and inflammation of the vagina)  The cause is not known  Bacteria normally found in the vagina are imbalanced  Your risk increases if you are sexually active, you use a douche, or you have an intrauterine device (IUD)  Common signs and symptoms of bacterial vaginosis:   White, gray, or yellow vaginal discharge    Vaginal discharge that smells like fish    Itching or burning around the outside of your vagina    Call your doctor or gynecologist if:   Your symptoms come back or do not improve with treatment  You have vaginal bleeding that is not your monthly period  You have questions or concerns about your condition or care  Antibiotics  are given to kill the bacteria  They may be given as a pill or as a cream to put in your vagina  Bacterial vaginosis and pregnancy:  If you have bacterial vaginosis during pregnancy, your baby may be born early or have a low birth weight  Your healthcare provider may recommend testing for bacterial vaginosis before or during your pregnancy  He or she will talk to you about your risk for premature delivery, and make sure you know the benefits and risks of testing  Prevent bacterial vaginosis:   Keep your vaginal area clean and dry  Wear underwear and pantyhose with a cotton crotch  Wipe from front to back after you urinate or have a bowel movement  After you bathe, rinse soap from your vaginal area to decrease your risk for irritation  Do not use products that cause irritation  Always use unscented tampons or sanitary pads  Do not use feminine sprays, powders, or scented tampons  They may cause irritation and increase your risk for vaginosis  Detergents and fabric softeners may also cause irritation  Do not use a douche  This can cause an imbalance in healthy vaginal bacteria  Use latex condoms during sex    This helps prevent another infection and keeps your partner from getting the infection  Follow up with your doctor or gynecologist as directed: Bacterial vaginosis increases the risk for several health problems, such as pelvic inflammatory disease (PID) or sexually transmitted infections  Work with your healthcare providers to schedule regular appointments to check for health problems  Write down your questions so you remember to ask them during your visits  © Copyright [employer] 2022 Information is for End User's use only and may not be sold, redistributed or otherwise used for commercial purposes  All illustrations and images included in CareNotes® are the copyrighted property of [employer] M , Inc  or [employer]   The above information is an  only  It is not intended as medical advice for individual conditions or treatments  Talk to your doctor, nurse or pharmacist before following any medical regimen to see if it is safe and effective for you  Safe Sex Practices   WHAT YOU NEED TO KNOW:   What are safe sex practices? Safe sex practices are ways to prevent pregnancy and the spread of sexually transmitted infections (STIs)  An STI happens when a virus or bacteria are spread through sexual activity  Safe sex practices help decrease or prevent body fluid exchange during sex  Body fluids include saliva, urine, blood, vaginal fluids, and semen  Oral, vaginal, and anal sex can all spread STIs  What are some safe sex practices to follow before I have sex? Talk to a new partner before you have sex  Tell your partner if you have an STI  Ask about his or her sex history and if he or she has a current or past STI  Your partner may need to be tested and treated  Do not have sex while you are being treated for an STI, or with a partner who is being treated  Limit your number of sex partners  More than one sex partner can increase your risk for an STI   Do not have sex with anyone whose sex history you do not know     Get tested for STIs if needed  Get tested if you had sex with someone who has an STI  Get tested if you have unprotected sex with any new partner  Talk to your healthcare provider about birth control  Birth control can help prevent an unwanted pregnancy  There are many different types of birth control  Talk to your healthcare provider about which birth control method is right for you  Ask about medicines to lower your risk for some STIs:      Vaccines  can help protect you from hepatitis A, hepatitis B, and the human papillomavirus (HPV)  The HPV vaccine is usually given at 11 years, but it may be given through 26 years to both females and males  Your provider can give you more information on vaccines to prevent STIs  Pre-exposure prophylaxis (PrEP)  may be given if you are at high risk for HIV  PrEP is taken every day to prevent the virus from fully infecting the body  Do not use alcohol or drugs before sex  These can prevent you from thinking clearly and increase your risk for unsafe sex  What are some safe sex practices to follow while I am having sex? Use condoms and barrier methods for all types of sexual contact  This includes oral, vaginal, and anal sex  Male and female condoms are available  Make sure that the condom fits and is put on correctly  Rubber latex sheets or dental dams can be used for oral sex  Use a new condom or latex barrier each time you have sex  Use latex condoms, if possible  Lambskin or natural condoms do not prevent STIs  If you or your partner is allergic to latex, use a nonlatex product, such as polyurethane  Use a second form of birth control with the condom to prevent pregnancy and STIs  Do not use male and female condoms together  Only use water-based lubricants during sex  Water-based lubricants help prevent sores or cuts in the vagina or on the penis  Prevent sores or cuts to decrease your risk for an STI   Do not use oil-based lubricants, such as baby oil or hand lotion, with latex condoms or barriers  These will weaken the latex and may cause the condom to break  Do not use chemicals that irritate your skin  Products that contain chemical irritants, such as spermicides, can irritate the lining of your vagina or rectum  Irritation may cause sores that can increase your risk for an STI  Be careful when you have sex if you have open sores or cuts  Open sores or cuts may increase your risk for an STI  Keep all open sores or cuts covered during sex  Do not have oral sex if you have cuts or sores in your mouth  Do not do activities that can pass germs  Do not use saliva as a lubricant or share sex toys  Where can I find more information? [id number] (NICK)  P [address]  [address] [facility] , [address] [place] [address]  Web Address: [email or web address]    When should I seek immediate care? A condom breaks, leaks, or slips off while you are having sex  You notice sores on your penis, vagina, anal area, or the skin around them  You have had unsafe sex and want to discuss emergency contraception or treatment for STI exposure  When should I call my doctor? You think you or your female sex partner might be pregnant  You have questions or concerns about your condition or care  CARE AGREEMENT:   You have the right to help plan your care  Learn about your health condition and how it may be treated  Discuss treatment options with your healthcare providers to decide what care you want to receive  You always have the right to refuse treatment  The above information is an  only  It is not intended as medical advice for individual conditions or treatments  Talk to your doctor, nurse or pharmacist before following any medical regimen to see if it is safe and effective for you    © Copyright [employer] 2022 Information is for End User's use only and may not be sold, redistributed or otherwise used for commercial purposes   All illustrations and images included in CareNotes® are the copyrighted property of A D A M , Inc  or [facility] Adrian Ward

## 2022-07-14 NOTE — PROGRESS NOTES
Radha Correia was seen today for follow-up  Diagnoses and all orders for this visit:    BV (bacterial vaginosis)  -     metroNIDAZOLE (FLAGYL) 500 mg tablet; Take 1 tablet (500 mg total) by mouth every 12 (twelve) hours for 7 days    Screening examination for STD (sexually transmitted disease)  -     Trichomonas Vaginalis, GRECIA  -     Chlamydia/GC amplified DNA by PCR    Vaginal discharge  -     POCT wet mount    Vaginal odor      Results for orders placed or performed in visit on 07/14/22   POCT wet mount   Result Value Ref Range    Yeast, Wet Prep Neg     pH 4 0     Whiff Test Neg     Clue Cells Neg     Trich, Wet Prep Neg         Reviewed perineal hygiene and products to avoid  Avoid sexual activity until finished antibiotic and STD tests results  Condom use advised with all sexual activity to avoid unplanned pregnancy and STIs  Advised partner will require treatment also with any positive results  Call if no symptom improvement, all questions answered, return for annual     Subjective    CC: vaginal odor     Jayson Adkins is a 39 y o  female here for vaginal odor  She was treated for trichomonas on 6/13  she states she noted the odor resumed 3 days after completing treatment  She had one sexual encounter following treatment with the same partner but used protection  He assured her prior to this encounter he was also treated for trich  She denies fevers, chills, abdominal pain, pelvic pain, vaginal discharge, itching, or irritation  She experienced some dyspareunia during that encounter but denies pain otherwise  Denies douching or other products  Denies new sexual partners      Patient Active Problem List    Diagnosis Date Noted    Prediabetes 09/13/2021    Morbid obesity ([facility] ) 09/13/2021    Weight gain 09/13/2021    Gonorrhea 06/14/2021    Hordeolum externum of right upper eyelid 03/23/2021    Essential hypertension 12/20/2019    BMI 45 0-49 9, adult ([facility] ) 12/20/2019    H/O gestational diabetes mellitus, not currently pregnant 2019    Migraine 05/10/2019     Past Medical History:   Diagnosis Date    Asthma     childhood    Diabetes mellitus ([facility] )     gestational    Elective      Resolved 2016     H/O gestational diabetes mellitus, not currently pregnant 2019    Hypertension     Menorrhagia with regular cycle 2018    Migraine     Morbid obesity with BMI of 45 0-49 9, adult ([facility] )     Prediabetes 2021    Varicella     childhood     Past Surgical History:   Procedure Laterality Date    INDUCED        Social History     Tobacco Use    Smoking status: Never Smoker    Smokeless tobacco: Never Used   Vaping Use    Vaping Use: Never used   Substance Use Topics    Alcohol use: No    Drug use: No       Current Outpatient Medications:     amLODIPine (NORVASC) 5 mg tablet, Take 1 tablet (5 mg total) by mouth daily, Disp: 30 tablet, Rfl: 3    levonorgestrel (MIRENA) 20 MCG/24HR IUD, 1 each by Intrauterine route once, Disp: , Rfl:     lisinopril (ZESTRIL) 10 mg tablet, TAKE 1 TABLET BY MOUTH EVERY DAY, Disp: 90 tablet, Rfl: 1    metFORMIN (GLUCOPHAGE-XR) 750 mg 24 hr tablet, Take 1 tablet (750 mg total) by mouth daily with dinner, Disp: 90 tablet, Rfl: 1    metroNIDAZOLE (FLAGYL) 500 mg tablet, Take 1 tablet (500 mg total) by mouth every 12 (twelve) hours for 7 days, Disp: 14 tablet, Rfl: 0    phentermine 30 MG capsule, Take 1 capsule (30 mg total) by mouth every morning, Disp: 30 capsule, Rfl: 0    topiramate (TOPAMAX) 50 MG tablet, Take 1 tablet at night, Disp: 90 tablet, Rfl: 1    amoxicillin (AMOXIL) 500 mg capsule, TAKE 1 CAPSULE BY MOUTH TWICE A DAY FOR 10 DAYS (Patient not taking: Reported on 2022), Disp: , Rfl:     cyclobenzaprine (FLEXERIL) 5 mg tablet, Take 1 tablet (5 mg total) by mouth 3 (three) times a day as needed for muscle spasms (Patient not taking: Reported on 2022), Disp: 60 tablet, Rfl: 0    No Known Allergies  OB History    Para Term  AB Living   5 2 2   3 2   SAB IAB Ectopic Multiple Live Births         0 2      # Outcome Date GA Lbr Jay Jay/2nd Weight Sex Delivery Anes PTL Lv   5 Term 10/21/16 38w6d 07:10 / 00:08 3500 g (7 lb 11 5 oz) F Vag-Spont EPI N JENNY   4 Term 01/13/15 38w4d  3459 g (7 lb 10 oz) M Vag-Spont EPI  JENNY   3 AB            2 AB            1 AB                Vitals:    22 1335   BP: 136/84   Weight: 124 kg (274 lb 6 4 oz)   Height: 5' 6" (1 676 m)     Body mass index is 44 29 kg/m²  Review of Systems   Constitutional: Negative for chills and fever  Respiratory: Negative for shortness of breath  Cardiovascular: Negative for chest pain  Gastrointestinal: Negative for abdominal distention and abdominal pain  Genitourinary: Positive for dyspareunia  Negative for dysuria, flank pain, frequency, genital sores, hematuria, pelvic pain, urgency, vaginal bleeding, vaginal discharge and vaginal pain  Musculoskeletal: Negative for myalgias  Skin: Negative for rash  Neurological: Negative for headaches  Psychiatric/Behavioral: Negative for confusion  Physical Exam  Constitutional:       General: She is not in acute distress  Appearance: Normal appearance  She is not ill-appearing  Genitourinary:      Urethral meatus normal       No lesions in the vagina  Right Labia: No rash, tenderness, lesions or skin changes  Left Labia: No tenderness, lesions, skin changes or rash  No inguinal adenopathy present in the right or left side  Vaginal discharge (thin yellow/white discharge noted in moderate amount) present  No vaginal erythema, tenderness, bleeding or ulceration  Cervix is parous  No cervical motion tenderness, discharge, friability, lesion, polyp or nabothian cyst    HENT:      Head: Normocephalic and atraumatic     Eyes:      Conjunctiva/sclera: Conjunctivae normal    Pulmonary:      Effort: Pulmonary effort is normal    Abdominal: General: There is no distension  Palpations: Abdomen is soft  Tenderness: There is no abdominal tenderness  Musculoskeletal:         General: Normal range of motion  Cervical back: Normal range of motion and neck supple  Lymphadenopathy:      Lower Body: No right inguinal adenopathy  No left inguinal adenopathy  Neurological:      Mental Status: She is alert and oriented to person, place, and time  Skin:     General: Skin is warm and dry  Psychiatric:         Mood and Affect: Mood normal          Behavior: Behavior normal    Vitals and nursing note reviewed

## 2022-07-15 ENCOUNTER — TELEPHONE (OUTPATIENT)
Dept: OBGYN CLINIC | Age: 37
End: 2022-07-15

## 2022-07-15 DIAGNOSIS — A74.9 CHLAMYDIA INFECTION: Primary | ICD-10-CM

## 2022-07-15 LAB
C TRACH DNA SPEC QL NAA+PROBE: POSITIVE
N GONORRHOEA DNA SPEC QL NAA+PROBE: NEGATIVE

## 2022-07-15 RX ORDER — DOXYCYCLINE 100 MG/1
100 CAPSULE ORAL 2 TIMES DAILY
Qty: 14 CAPSULE | Refills: 0 | Status: CANCELLED | OUTPATIENT
Start: 2022-07-15 | End: 2022-07-22

## 2022-07-15 RX ORDER — DOXYCYCLINE 100 MG/1
100 CAPSULE ORAL 2 TIMES DAILY
Qty: 14 CAPSULE | Refills: 0 | Status: SHIPPED | OUTPATIENT
Start: 2022-07-15 | End: 2022-07-22

## 2022-07-15 NOTE — TELEPHONE ENCOUNTER
Spoke with patient via phone regarding test results  Advised to complete the full course of antibiotics prescribed and to avoid sexual activity until treatment is complete  Advised any partners she has come into contact with will need to be treated also  All questions addressed and answered

## 2022-07-18 ENCOUNTER — TELEPHONE (OUTPATIENT)
Dept: OBGYN CLINIC | Facility: CLINIC | Age: 37
End: 2022-07-18

## 2022-07-22 ENCOUNTER — OFFICE VISIT (OUTPATIENT)
Dept: OBGYN CLINIC | Facility: CLINIC | Age: 37
End: 2022-07-22
Payer: COMMERCIAL

## 2022-07-22 VITALS
DIASTOLIC BLOOD PRESSURE: 86 MMHG | BODY MASS INDEX: 43.84 KG/M2 | WEIGHT: 272.8 LBS | SYSTOLIC BLOOD PRESSURE: 126 MMHG | HEIGHT: 66 IN

## 2022-07-22 DIAGNOSIS — B37.9 YEAST INFECTION: ICD-10-CM

## 2022-07-22 DIAGNOSIS — Z11.3 SCREENING EXAMINATION FOR STD (SEXUALLY TRANSMITTED DISEASE): Primary | ICD-10-CM

## 2022-07-22 DIAGNOSIS — N89.8 VAGINAL DISCHARGE: ICD-10-CM

## 2022-07-22 PROCEDURE — 99211 OFF/OP EST MAY X REQ PHY/QHP: CPT

## 2022-07-22 PROCEDURE — 81514 NFCT DS BV&VAGINITIS DNA ALG: CPT

## 2022-07-22 RX ORDER — FLUCONAZOLE 150 MG/1
150 TABLET ORAL ONCE
Qty: 2 TABLET | Refills: 0 | Status: SHIPPED | OUTPATIENT
Start: 2022-07-22 | End: 2022-07-22

## 2022-07-22 NOTE — PATIENT INSTRUCTIONS
Safe Sex Practices   WHAT YOU NEED TO KNOW:   What are safe sex practices? Safe sex practices are ways to prevent pregnancy and the spread of sexually transmitted infections (STIs)  An STI happens when a virus or bacteria are spread through sexual activity  Safe sex practices help decrease or prevent body fluid exchange during sex  Body fluids include saliva, urine, blood, vaginal fluids, and semen  Oral, vaginal, and anal sex can all spread STIs  What are some safe sex practices to follow before I have sex? Talk to a new partner before you have sex  Tell your partner if you have an STI  Ask about his or her sex history and if he or she has a current or past STI  Your partner may need to be tested and treated  Do not have sex while you are being treated for an STI, or with a partner who is being treated  Limit your number of sex partners  More than one sex partner can increase your risk for an STI  Do not have sex with anyone whose sex history you do not know  Get tested for STIs if needed  Get tested if you had sex with someone who has an STI  Get tested if you have unprotected sex with any new partner  Talk to your healthcare provider about birth control  Birth control can help prevent an unwanted pregnancy  There are many different types of birth control  Talk to your healthcare provider about which birth control method is right for you  Ask about medicines to lower your risk for some STIs:      Vaccines  can help protect you from hepatitis A, hepatitis B, and the human papillomavirus (HPV)  The HPV vaccine is usually given at 11 years, but it may be given through 26 years to both females and males  Your provider can give you more information on vaccines to prevent STIs  Pre-exposure prophylaxis (PrEP)  may be given if you are at high risk for HIV  PrEP is taken every day to prevent the virus from fully infecting the body  Do not use alcohol or drugs before sex    These can prevent you from thinking clearly and increase your risk for unsafe sex  What are some safe sex practices to follow while I am having sex? Use condoms and barrier methods for all types of sexual contact  This includes oral, vaginal, and anal sex  Male and female condoms are available  Make sure that the condom fits and is put on correctly  Rubber latex sheets or dental dams can be used for oral sex  Use a new condom or latex barrier each time you have sex  Use latex condoms, if possible  Lambskin or natural condoms do not prevent STIs  If you or your partner is allergic to latex, use a nonlatex product, such as polyurethane  Use a second form of birth control with the condom to prevent pregnancy and STIs  Do not use male and female condoms together  Only use water-based lubricants during sex  Water-based lubricants help prevent sores or cuts in the vagina or on the penis  Prevent sores or cuts to decrease your risk for an STI  Do not use oil-based lubricants, such as baby oil or hand lotion, with latex condoms or barriers  These will weaken the latex and may cause the condom to break  Do not use chemicals that irritate your skin  Products that contain chemical irritants, such as spermicides, can irritate the lining of your vagina or rectum  Irritation may cause sores that can increase your risk for an STI  Be careful when you have sex if you have open sores or cuts  Open sores or cuts may increase your risk for an STI  Keep all open sores or cuts covered during sex  Do not have oral sex if you have cuts or sores in your mouth  Do not do activities that can pass germs  Do not use saliva as a lubricant or share sex toys  Where can I find more information? [id number] [address] ([facility])  P [address]  [address]  Web Address: http://www [employer]/  org    When should I seek immediate care? A condom breaks, leaks, or slips off while you are having sex      You notice sores on your penis, vagina, anal area, or the skin around them  You have had unsafe sex and want to discuss emergency contraception or treatment for STI exposure  When should I call my doctor? You think you or your female sex partner might be pregnant  You have questions or concerns about your condition or care  CARE AGREEMENT:   You have the right to help plan your care  Learn about your health condition and how it may be treated  Discuss treatment options with your healthcare providers to decide what care you want to receive  You always have the right to refuse treatment  The above information is an  only  It is not intended as medical advice for individual conditions or treatments  Talk to your doctor, nurse or pharmacist before following any medical regimen to see if it is safe and effective for you  © Copyright [address] 2022 Information is for End User's use only and may not be sold, redistributed or otherwise used for commercial purposes  All illustrations and images included in CareNotes® are the copyrighted property of A D A M , Inc  or [employer]    Perineal Hygiene     No soaps or feminine wash to the vulva  Use only water to cleanse, or water with Dove or [employer] if necessary  No lotion to the area  Use only coconut oil for moisture if needed   No douching     Cotton underware, loose fitting clothing  Only perfume-free, dye-free laundry detergent, use a second rinse cycle   Avoid fabric softeners/dryer sheets  Coconut oil as a lubricant (if not using condoms) or another scent-free lubricant (Astroglide, Uberlube) if needed  Partner to avoid the same products as well  Over the counter probiotic to restore vaginal elpidio may be helpful as well     You may also look into Boric Acid vaginal suppositories to restore vaginal PH balance for up to 2 weeks as directed on the box   You may not use these if you are pregnant     Yeast Infection   AMBULATORY CARE:   A yeast infection , or vaginal candidiasis, is a common vaginal infection  A yeast infection is caused by a fungus, or yeast-like germ  Fungi are normally found in your vagina  Too many fungi can cause an infection  Common signs and symptoms: Thick, white, cheese-like discharge from your vagina    Itching, swelling, and redness in your vagina    Pain or burning when you urinate    Pain during sexual intercourse    Call your doctor or gynecologist if:   You have a fever and chills  You develop abdominal or pelvic pain  Your discharge is bloody and it is not your monthly period  Your signs and symptoms get worse, even after treatment  You have questions or concerns about your condition or care  Treatment for a yeast infection  includes medicines to treat the fungal infection and decrease inflammation  The medicine may be a pill, cream, ointment, or vaginal tablet or suppository  Keep your vagina healthy:   Clean your genital area with mild soap and warm water each day  Do not get soap inside your vagina  Gently dry the area after washing  Do not use hot tubs  The heat and moisture from hot tubs can increase your risk for another yeast infection  Always wipe from front to back  after you use the toilet  This prevents spreading bacteria from your rectal area into your vagina  Do not wear tight-fitting clothes or undergarments  for long periods of time  Wear cotton underwear during the day  Cotton helps keep your genital area dry and does not hold in warmth or moisture  Do not wear underwear at night  Do not douche  or use feminine hygiene sprays or bubble bath  Do not use pads or tampons that are scented, or colored or perfumed toilet paper  Do not have sex until your symptoms go away  Have your partner wear a condom until you complete your course of medication  Ask your healthcare provider about birth control options if necessary    Condoms have latex and diaphragms have gel that kills sperm  Both of these may irritate your genital area  Follow up with your doctor or gynecologist as directed:  Write down your questions so you remember to ask them during your visits  © Copyright [employer] 2022 Information is for End User's use only and may not be sold, redistributed or otherwise used for commercial purposes  All illustrations and images included in CareNotes® are the copyrighted property of A D A M , Inc  or Azeem Darden   The above information is an  only  It is not intended as medical advice for individual conditions or treatments  Talk to your doctor, nurse or pharmacist before following any medical regimen to see if it is safe and effective for you

## 2022-07-22 NOTE — PROGRESS NOTES
Diagnoses and all orders for this visit:    Screening examination for STD (sexually transmitted disease)  -     Molecular Vaginal Panel    Vaginal discharge  -     Molecular Vaginal Panel    Yeast infection  -     fluconazole (DIFLUCAN) 150 mg tablet; Take 1 tablet (150 mg total) by mouth once for 1 dose May repeat dose in 3 days  Results for orders placed or performed in visit on 07/14/22   Chlamydia/GC amplified DNA by PCR    Specimen: Vaginal; Genital   Result Value Ref Range    N gonorrhoeae, DNA Probe Negative Negative    Chlamydia trachomatis, DNA Probe Positive (A) Negative   POCT wet mount   Result Value Ref Range    Yeast, Wet Prep Neg     pH 4 0     Whiff Test Neg     Clue Cells Neg     Trich, Wet Prep Neg         Reviewed perineal hygiene and products to avoid  Advised to abstain from sexual activity until antibiotic course is completed  Advised condoms with all sexual activity to prevent unplanned pregnancy and STIs  Call with any new or worsening symptoms  All questions answered  Return for annual      Subjective    CC: recollection of trichomonas    Elizabeth Winetr is a 39 y o  female here for trichomonas recollection  The lab was unable to run the previous sample due to the collection method  She was recently treated for chlamydia  She is on the last day of her antibiotics and reports increased vaginal itching and irritation  She states she often gets yeast infections after she takes antibiotics  She denies fevers, chills, abnormal vaginal discharge, pelvic pain, or dyspareunia  She asked if she should be using a cleansing solution or suppository for hygiene  Advised this is not necessary and she should avoid douching  She states she informed her previous partner about her positive result and he should be treated also though she is no longer seeing him       Patient Active Problem List    Diagnosis Date Noted    Prediabetes 09/13/2021    Morbid obesity ([facility] [address] ) 09/13/2021    Weight gain 2021    Gonorrhea 2021    Hordeolum externum of right upper eyelid 2021    Essential hypertension 2019    BMI 45 0-49 9, adult ([facility] ) 2019    H/O gestational diabetes mellitus, not currently pregnant 2019    Migraine 05/10/2019     Past Medical History:   Diagnosis Date    Asthma     childhood    Diabetes mellitus ([facility] )     gestational    Elective      Resolved 2016     H/O gestational diabetes mellitus, not currently pregnant 2019    Hypertension     Menorrhagia with regular cycle 2018    Migraine     Morbid obesity with BMI of 45 0-49 9, adult ([facility] )     Prediabetes 2021    Varicella     childhood     Past Surgical History:   Procedure Laterality Date    INDUCED        Social History     Tobacco Use    Smoking status: Never Smoker    Smokeless tobacco: Never Used   Vaping Use    Vaping Use: Never used   Substance Use Topics    Alcohol use: No    Drug use: No       Current Outpatient Medications:     amLODIPine (NORVASC) 5 mg tablet, Take 1 tablet (5 mg total) by mouth daily, Disp: 30 tablet, Rfl: 3    amoxicillin (AMOXIL) 500 mg capsule, , Disp: , Rfl:     doxycycline monohydrate (MONODOX) 100 mg capsule, Take 1 capsule (100 mg total) by mouth 2 (two) times a day for 7 days, Disp: 14 capsule, Rfl: 0    fluconazole (DIFLUCAN) 150 mg tablet, Take 1 tablet (150 mg total) by mouth once for 1 dose May repeat dose in 3 days  , Disp: 2 tablet, Rfl: 0    levonorgestrel (MIRENA) 20 MCG/24HR IUD, 1 each by Intrauterine route once, Disp: , Rfl:     lisinopril (ZESTRIL) 10 mg tablet, TAKE 1 TABLET BY MOUTH EVERY DAY, Disp: 90 tablet, Rfl: 1    metFORMIN (GLUCOPHAGE-XR) 750 mg 24 hr tablet, Take 1 tablet (750 mg total) by mouth daily with dinner, Disp: 90 tablet, Rfl: 1    phentermine 30 MG capsule, Take 1 capsule (30 mg total) by mouth every morning, Disp: 30 capsule, Rfl: 0    topiramate (TOPAMAX) 50 MG tablet, Take 1 tablet at night, Disp: 90 tablet, Rfl: 1    cyclobenzaprine (FLEXERIL) 5 mg tablet, Take 1 tablet (5 mg total) by mouth 3 (three) times a day as needed for muscle spasms (Patient not taking: No sig reported), Disp: 60 tablet, Rfl: 0    No Known Allergies  OB History    Para Term  AB Living   5 2 2   3 2   SAB IAB Ectopic Multiple Live Births         0 2      # Outcome Date GA Lbr Jay Jay/2nd Weight Sex Delivery Anes PTL Lv   5 Term 10/21/16 38w6d 07:10 / 00:08 3500 g (7 lb 11 5 oz) F Vag-Spont EPI N JENNY   4 Term 01/13/15 38w4d  3459 g (7 lb 10 oz) M Vag-Spont EPI  JENNY   3 AB            2 AB            1 AB                Vitals:    22 0956   BP: 126/86   BP Location: Right arm   Patient Position: Sitting   Weight: 124 kg (272 lb 12 8 oz)   Height: 5' 6" (1 676 m)     Body mass index is 44 03 kg/m²  Review of Systems   Constitutional: Negative for chills and fever  Respiratory: Negative for shortness of breath  Cardiovascular: Negative for chest pain  Gastrointestinal: Negative for abdominal pain, constipation, diarrhea, nausea and vomiting  Genitourinary: Negative for dyspareunia, dysuria, flank pain, genital sores, hematuria, pelvic pain, vaginal bleeding, vaginal discharge and vaginal pain         +vaginal itching and irritation   Musculoskeletal: Negative for myalgias  Neurological: Negative for headaches  Psychiatric/Behavioral: Negative for confusion  Physical Exam  Constitutional:       General: She is not in acute distress  Appearance: Normal appearance  She is not ill-appearing  Genitourinary:      No lesions in the vagina  Right Labia: No rash, tenderness, lesions, skin changes or Bartholin's cyst      Left Labia: No tenderness, lesions, skin changes, Bartholin's cyst or rash  No inguinal adenopathy present in the right or left side       Vaginal discharge (thick white discharge adhered to lateral and anterior vaginal wall consistent with yeast) present  No vaginal erythema, tenderness, bleeding or ulceration  Cervix is parous  No cervical motion tenderness, discharge, friability, lesion, polyp, nabothian cyst or eversion  IUD strings visualized  Pelvic exam was performed with patient in the lithotomy position  HENT:      Head: Normocephalic and atraumatic  Eyes:      Conjunctiva/sclera: Conjunctivae normal    Pulmonary:      Effort: Pulmonary effort is normal    Abdominal:      General: There is no distension  Palpations: Abdomen is soft  Tenderness: There is no abdominal tenderness  Musculoskeletal:         General: Normal range of motion  Cervical back: Normal range of motion and neck supple  Lymphadenopathy:      Lower Body: No right inguinal adenopathy  No left inguinal adenopathy  Neurological:      Mental Status: She is alert and oriented to person, place, and time  Skin:     General: Skin is warm and dry  Psychiatric:         Mood and Affect: Mood normal          Behavior: Behavior normal    Vitals and nursing note reviewed

## 2022-07-23 LAB
C GLABRATA DNA VAG QL NAA+PROBE: NEGATIVE
C KRUSEI DNA VAG QL NAA+PROBE: NEGATIVE
CANDIDA SP 6 PNL VAG NAA+PROBE: POSITIVE
T VAGINALIS DNA VAG QL NAA+PROBE: NEGATIVE
VAGINOSIS/ITIS DNA PNL VAG PROBE+SIG AMP: NEGATIVE

## 2022-08-04 NOTE — PROGRESS NOTES
Assessment/Plan:     Chronic Problems:  Essential hypertension  BP's are better, but not there yet  Will start hctz in the am and repeat bmp in 1 week  Morbid obesity with BMI of 45 0-49 9, adult ([facility] [address] )  Also suggest weight loss by calorie restriction and increase the exercise  Migraine  Well controlled on verapamil  Will reorder when needed  Visit Diagnosis:  Diagnoses and all orders for this visit:    Essential hypertension  -     hydrochlorothiazide (HYDRODIURIL) 25 mg tablet; Take 1 tablet (25 mg total) by mouth daily  -     Basic metabolic panel; Future    Other migraine without status migrainosus, not intractable    Other orders  -     levonorgestrel (MIRENA) 20 MCG/24HR IUD; 1 each by Intrauterine route once          Subjective:    Patient ID: Paola Gurrola is a 29 y o  female  Pt is here for f/u on her bp's  Thinks the bp's are down from the 160's to the 130s and 140s at home  Diastolic max was 89 ; Feels well  Feels the verapamil has helped her headaches and it was not a bad one  Not sick and has no concerns today  Meds reviewed with pt and current  Takes all other meds as directed  No side effects noted  Has a plan to start boot camp with her brother for weight  The following portions of the patient's history were reviewed and updated as appropriate: allergies, current medications, past family history, past medical history, past social history, past surgical history and problem list     Review of Systems   Constitutional: Negative for chills, diaphoresis, fatigue and fever  HENT: Negative  Eyes: Negative  Respiratory: Negative for cough, shortness of breath and wheezing  Cardiovascular: Negative for chest pain and palpitations  Gastrointestinal: Negative  Genitourinary: Negative  FDLMP - due now  Has a mirena  Neurological: Positive for headaches (only 1 headache and it was mild since the last visit  )  Negative for dizziness  Plan of Care Review  Plan of Care Reviewed With: patient, son, grandchild(rodriguez)  Progress: improving  Outcome Summary: VSS. Levo, vaso, dobutamine, fentanyl gtts. MAP goal >65. [place] placed today in cath lab- [facility]. Left radial A-line. Pt alert and calm on vent. Follows commands. Will, lasix 40mg x1 today. Penile edema. Buttock/perineal Wound packing changed using dakins and santyl. Will continue to monitor.   Psychiatric/Behavioral: Negative for dysphoric mood  The patient is not nervous/anxious            /90   Pulse 94   Temp 97 8 °F (36 6 °C)   Resp 16   Ht 5' 4" (1 626 m)   Wt 120 kg (264 lb)   LMP 2020   SpO2 96%   BMI 45 32 kg/m²   Social History     Socioeconomic History    Marital status: /Civil Union     Spouse name: Not on file    Number of children: Not on file    Years of education: Not on file    Highest education level: Not on file   Occupational History    Not on file   Social Needs    Financial resource strain: Not on file    Food insecurity:     Worry: Not on file     Inability: Not on file    Transportation needs:     Medical: Not on file     Non-medical: Not on file   Tobacco Use    Smoking status: Never Smoker    Smokeless tobacco: Never Used   Substance and Sexual Activity    Alcohol use: No    Drug use: No    Sexual activity: Yes     Partners: Male     Birth control/protection: IUD     Comment:    Lifestyle    Physical activity:     Days per week: Not on file     Minutes per session: Not on file    Stress: Not on file   Relationships    Social connections:     Talks on phone: Not on file     Gets together: Not on file     Attends Latter day service: Not on file     Active member of club or organization: Not on file     Attends meetings of clubs or organizations: Not on file     Relationship status: Not on file    Intimate partner violence:     Fear of current or ex partner: Not on file     Emotionally abused: Not on file     Physically abused: Not on file     Forced sexual activity: Not on file   Other Topics Concern    Not on file   Social History Narrative    Always uses seat belt     Past Medical History:   Diagnosis Date    Asthma     childhood    Diabetes mellitus ([facility] [address] )     gestational    Elective      Resolved 2016     Hypertension     Migraine     Varicella     childhood     Family History   Problem Relation Age of Onset  Cancer Mother     Heart disease Mother     Stroke Mother     Other Mother     Hypertension Mother    [employer] Breast cancer Mother     Diabetes Father     Asthma Sister     Asthma Brother     Diabetes Paternal [de-identified]     Asthma Brother     Asthma Brother     Asthma Sister     Asthma Sister      Past Surgical History:   Procedure Laterality Date    INDUCED          Current Outpatient Medications:     levonorgestrel (MIRENA) 20 MCG/24HR IUD, 1 each by Intrauterine route once, Disp: , Rfl:     verapamil (CALAN-SR) 240 mg CR tablet, Take 1 tablet (240 mg total) by mouth daily at bedtime, Disp: 30 tablet, Rfl: 0    hydrochlorothiazide (HYDRODIURIL) 25 mg tablet, Take 1 tablet (25 mg total) by mouth daily, Disp: 30 tablet, Rfl: 1    ibuprofen (MOTRIN) 800 mg tablet, Take 1 tablet (800 mg total) by mouth every 6 (six) hours as needed for mild pain for up to 10 days, Disp: 30 tablet, Rfl: 0    No Known Allergies       Lab Review   No visits with results within 2 Month(s) from this visit  Latest known visit with results is:   Appointment on 2019   Component Date Value    TSH 3RD GENERATON 2019 0 957     Sodium 2019 142     Potassium 2019 4 2     Chloride 2019 105     CO2 2019 26     ANION GAP 2019 11     BUN 2019 16     Creatinine 2019 1 12     Glucose, Fasting 2019 104*    Calcium 2019 8 8     AST 2019 14     ALT 2019 23     Alkaline Phosphatase 2019 85     Total Protein 2019 7 7     Albumin 2019 3 5     Total Bilirubin 2019 0 20     eGFR 2019 75     Cholesterol 2019 173     Triglycerides 2019 57     HDL, Direct 2019 51     LDL Calculated 2019 111*    Non-HDL-Chol (CHOL-HDL) 2019 122         Imaging: No results found  Objective:     Physical Exam   Constitutional: She is oriented to person, place, and time   She appears well-developed and well-nourished  No distress  HENT:   Head: Normocephalic and atraumatic  Right Ear: External ear normal    Left Ear: External ear normal    Mouth/Throat: Oropharynx is clear and moist    Eyes: Pupils are equal, round, and reactive to light  Conjunctivae and EOM are normal  Right eye exhibits no discharge  Left eye exhibits no discharge  Neck: Normal range of motion  Neck supple  Cardiovascular: Normal rate, regular rhythm and normal heart sounds  Pulmonary/Chest: Effort normal and breath sounds normal  No respiratory distress  She has no wheezes  She has no rales  She exhibits no tenderness  Abdominal:   Abdomen is obese  Musculoskeletal: Normal range of motion  She exhibits no edema, tenderness or deformity  Lymphadenopathy:     She has no cervical adenopathy  Neurological: She is alert and oriented to person, place, and time  No cranial nerve deficit  Skin: Skin is warm and dry  No rash noted  She is not diaphoretic  Psychiatric: She has a normal mood and affect  Her behavior is normal  Judgment and thought content normal          Patient Instructions   Discussed all with patient  There has been a very significant decrease in your blood pressure but you are not there yet  Will add hydrochlorothiazide in the morning make sure you're drinking at least 6-8 glasses of water a day  Stay on the verapamil as that has brought your blood pressure down and the headaches  Continue to work on the [employer] and do your boot camp  Will follow up here in about 4 weeks but bring her numbers from home with you  Avoid salt  SAGRARIO Richardson    Portions of the record may have been created with voice recognition software  Occasional wrong word or "sound a like" substitutions may have occurred due to the inherent limitations of voice recognition software  Read the chart carefully and recognize, using context, where substitutions have occurred

## 2022-08-09 ENCOUNTER — OFFICE VISIT (OUTPATIENT)
Dept: FAMILY MEDICINE CLINIC | Facility: CLINIC | Age: 37
End: 2022-08-09
Payer: COMMERCIAL

## 2022-08-09 VITALS
TEMPERATURE: 98.2 F | BODY MASS INDEX: 42.97 KG/M2 | RESPIRATION RATE: 16 BRPM | SYSTOLIC BLOOD PRESSURE: 138 MMHG | HEART RATE: 108 BPM | WEIGHT: 267.4 LBS | OXYGEN SATURATION: 98 % | HEIGHT: 66 IN | DIASTOLIC BLOOD PRESSURE: 80 MMHG

## 2022-08-09 DIAGNOSIS — I10 ESSENTIAL HYPERTENSION: Primary | ICD-10-CM

## 2022-08-09 DIAGNOSIS — R63.5 WEIGHT GAIN: ICD-10-CM

## 2022-08-09 DIAGNOSIS — E66.01 MORBID OBESITY (HCC): ICD-10-CM

## 2022-08-09 PROCEDURE — 3075F SYST BP GE 130 - 139MM HG: CPT

## 2022-08-09 PROCEDURE — 3079F DIAST BP 80-89 MM HG: CPT

## 2022-08-09 PROCEDURE — 99214 OFFICE O/P EST MOD 30 MIN: CPT

## 2022-08-09 RX ORDER — PHENTERMINE HYDROCHLORIDE 30 MG/1
30 CAPSULE ORAL EVERY MORNING
Qty: 30 CAPSULE | Refills: 0 | Status: SHIPPED | OUTPATIENT
Start: 2022-08-09 | End: 2022-09-21 | Stop reason: SDUPTHER

## 2022-08-09 NOTE — ASSESSMENT & PLAN NOTE
Continue to take  Phentermine daily  5 lb weight loss since last visit    Continue to stick to 1200 calorie daily,  Stay active

## 2022-08-09 NOTE — ASSESSMENT & PLAN NOTE
Borderline today, continue to check BP daily,  Let me know if you blood pressure reaches 140/80    Continue to take medications as prescribed

## 2022-08-09 NOTE — PROGRESS NOTES
Assessment/Plan:     Problem List Items Addressed This Visit        Cardiovascular and Mediastinum    Essential hypertension - Primary     Borderline today, continue to check BP daily,  Let me know if you blood pressure reaches 140/80  Continue to take medications as prescribed            Other    Adult BMI 40 0-44 9 kg/sq m ([facility] [address] )      Continue to take  Phentermine daily  5 lb weight loss since last visit  Continue to stick to 1200 calorie daily,  Stay active         Relevant Medications    phentermine 30 MG capsule    Morbid obesity (HCC)    Relevant Medications    phentermine 30 MG capsule    Weight gain      Continue to take phentermine daily,  Monitor blood pressure at home         Relevant Medications    phentermine 30 MG capsule            Subjective:      Patient ID: Shelli Diaz is a 39 y o  female  Patient presents to the office for a weight loss follow-up  She has been taking 30 mg of phentermine daily  Her blood pressure today slightly elevated, but states most likely because she has her  Kids with her  She states she has no side effects like headaches, dizziness, palpitations or shortness of breath  Continues to check her blood pressure at home daily      The following portions of the patient's history were reviewed and updated as appropriate:   Past Medical History:  She has a past medical history of Asthma, [facility] ), Elective , H/O gestational diabetes mellitus, not currently pregnant (2019), Hypertension, Menorrhagia with regular cycle (2018), Migraine, Morbid obesity with BMI of 45 0-49 9, adult ([facility] [address] ), Prediabetes (2021), and Varicella  ,  _______________________________________________________________________  Medical Problems:  does not have any pertinent problems on file ,  _______________________________________________________________________  Past Surgical History:   has a past surgical history that includes Induced   ,  _______________________________________________________________________  Family History:  family history includes Asthma in her brother, brother, brother, sister, sister, and sister; Breast cancer in her mother; Cancer in her mother; Diabetes in her father and paternal grandmother; Heart disease in her mother; Hypertension in her mother; Other in her mother; Stroke in her mother ,  _______________________________________________________________________  Social History:   reports that she has never smoked  She has never used smokeless tobacco  She reports that she does not drink alcohol and does not use drugs  ,  _______________________________________________________________________  Allergies:  has No Known Allergies     _______________________________________________________________________  Current Outpatient Medications   Medication Sig Dispense Refill    amLODIPine (NORVASC) 5 mg tablet Take 1 tablet (5 mg total) by mouth daily 30 tablet 3    levonorgestrel (MIRENA) 20 MCG/24HR IUD 1 each by Intrauterine route once      lisinopril (ZESTRIL) 10 mg tablet TAKE 1 TABLET BY MOUTH EVERY DAY 90 tablet 1    metFORMIN (GLUCOPHAGE-XR) 750 mg 24 hr tablet Take 1 tablet (750 mg total) by mouth daily with dinner 90 tablet 1    phentermine 30 MG capsule Take 1 capsule (30 mg total) by mouth every morning 30 capsule 0    topiramate (TOPAMAX) 50 MG tablet Take 1 tablet at night 90 tablet 1     No current facility-administered medications for this visit      _______________________________________________________________________  Review of Systems   Constitutional: Negative for chills and fever  HENT: Negative for ear pain and sore throat  Eyes: Negative for pain and visual disturbance  Respiratory: Negative for cough and shortness of breath  Cardiovascular: Negative for chest pain and palpitations  Gastrointestinal: Negative for abdominal pain and vomiting     Genitourinary: Negative for dysuria and hematuria  Musculoskeletal: Negative for arthralgias and back pain  Skin: Negative for color change and rash  Neurological: Negative for seizures and syncope  All other systems reviewed and are negative  Objective:  Vitals:    08/09/22 1300 08/09/22 1320   BP: (!) 140/106 138/80   Pulse: (!) 108    Resp: 16    Temp: 98 2 °F (36 8 °C)    SpO2: 98%    Weight: 121 kg (267 lb 6 4 oz)    Height: 5' 6" (1 676 m)      Body mass index is 43 16 kg/m²  Physical Exam  Vitals and nursing note reviewed  Constitutional:       General: She is not in acute distress  Appearance: Normal appearance  She is not ill-appearing  HENT:      Head: Normocephalic  Right Ear: External ear normal       Left Ear: External ear normal       Nose: Nose normal       Mouth/Throat:      Mouth: Mucous membranes are moist    Eyes:      Conjunctiva/sclera: Conjunctivae normal    Cardiovascular:      Rate and Rhythm: Regular rhythm  Tachycardia present  Pulses: Normal pulses  Heart sounds: Normal heart sounds  Pulmonary:      Effort: Pulmonary effort is normal  No respiratory distress  Breath sounds: Normal breath sounds  No wheezing  Abdominal:      General: Bowel sounds are normal       Palpations: Abdomen is soft  Musculoskeletal:         General: No swelling or tenderness  Normal range of motion  Cervical back: Normal range of motion  No tenderness  Right lower leg: No edema  Left lower leg: No edema  Lymphadenopathy:      Cervical: No cervical adenopathy  Skin:     General: Skin is warm and dry  Neurological:      Mental Status: She is alert and oriented to person, place, and time  Psychiatric:         Mood and Affect: Mood normal          Behavior: Behavior normal          Thought Content:  Thought content normal          Judgment: Judgment normal

## 2022-09-21 ENCOUNTER — OFFICE VISIT (OUTPATIENT)
Dept: FAMILY MEDICINE CLINIC | Facility: CLINIC | Age: 37
End: 2022-09-21
Payer: COMMERCIAL

## 2022-09-21 VITALS
HEART RATE: 97 BPM | SYSTOLIC BLOOD PRESSURE: 128 MMHG | OXYGEN SATURATION: 97 % | WEIGHT: 263.4 LBS | BODY MASS INDEX: 42.33 KG/M2 | TEMPERATURE: 97.3 F | DIASTOLIC BLOOD PRESSURE: 88 MMHG | HEIGHT: 66 IN

## 2022-09-21 DIAGNOSIS — R63.5 WEIGHT GAIN: Primary | ICD-10-CM

## 2022-09-21 DIAGNOSIS — E66.01 MORBID OBESITY (HCC): ICD-10-CM

## 2022-09-21 DIAGNOSIS — Z23 NEED FOR VACCINATION: ICD-10-CM

## 2022-09-21 DIAGNOSIS — R73.03 PREDIABETES: ICD-10-CM

## 2022-09-21 DIAGNOSIS — I10 ESSENTIAL HYPERTENSION: ICD-10-CM

## 2022-09-21 PROBLEM — M72.2 PLANTAR FASCIITIS: Status: ACTIVE | Noted: 2022-09-21

## 2022-09-21 PROCEDURE — 3074F SYST BP LT 130 MM HG: CPT

## 2022-09-21 PROCEDURE — 0124A ADM SARSCV2 BVL 30MCG/.3ML B: CPT

## 2022-09-21 PROCEDURE — 91312 SARSCOV2 VAC BVL 30MCG/0.3ML: CPT

## 2022-09-21 PROCEDURE — 99214 OFFICE O/P EST MOD 30 MIN: CPT

## 2022-09-21 PROCEDURE — 3079F DIAST BP 80-89 MM HG: CPT

## 2022-09-21 RX ORDER — PHENTERMINE HYDROCHLORIDE 30 MG/1
30 CAPSULE ORAL EVERY MORNING
Qty: 30 CAPSULE | Refills: 0 | Status: SHIPPED | OUTPATIENT
Start: 2022-09-21 | End: 2022-09-22 | Stop reason: SDUPTHER

## 2022-09-21 RX ORDER — METFORMIN HYDROCHLORIDE 750 MG/1
750 TABLET, EXTENDED RELEASE ORAL
Qty: 90 TABLET | Refills: 1 | Status: SHIPPED | OUTPATIENT
Start: 2022-09-21

## 2022-09-21 RX ORDER — LISINOPRIL 10 MG/1
10 TABLET ORAL DAILY
Qty: 90 TABLET | Refills: 1 | Status: SHIPPED | OUTPATIENT
Start: 2022-09-21

## 2022-09-21 RX ORDER — AMLODIPINE BESYLATE 5 MG/1
5 TABLET ORAL DAILY
Qty: 90 TABLET | Refills: 1 | Status: SHIPPED | OUTPATIENT
Start: 2022-09-21

## 2022-09-21 NOTE — PROGRESS NOTES
Assessment/Plan:         Problem List Items Addressed This Visit        Cardiovascular and Mediastinum    Essential hypertension     Continue current medication regiment         Relevant Medications    amLODIPine (NORVASC) 5 mg tablet    lisinopril (ZESTRIL) 10 mg tablet       Other    Adult BMI 40 0-44 9 kg/sq m (HCC)     Continue to stay active and walk on a track, continue to eat 1200 calorie diet daily, continue to drink water         Relevant Medications    phentermine 30 MG capsule    Prediabetes     Continue to take metformin 750 mg daily         Relevant Medications    metFORMIN (GLUCOPHAGE-XR) 750 mg 24 hr tablet    Morbid obesity (HCC)    Relevant Medications    phentermine 30 MG capsule    Weight gain - Primary     Recent weight loss of 4 lb, great job  Relevant Medications    phentermine 30 MG capsule      Other Visit Diagnoses     Need for vaccination        Relevant Orders    Age 15 y+, BOOSTER (BIVALENT): COVID-19 Khurram gleason bivalent bethany-sucr (Completed)            Subjective:      Patient ID: Antoni Ely is a 39 y o  female  Patient presents to office for weight loss follow-up  She has been taking 30 mg phentermine daily  She is also on 750 mg metformin and 100 mg of Topamax  She has been consistent with her 1200 calorie a day diet and she makes an effort to walk on a track   Her recent weight losses 4 lb  She denies any chest pain, shortness a breath, palpitations or headaches        The following portions of the patient's history were reviewed and updated as appropriate:   Past Medical History:  She has a past medical history of Asthma, Diabetes mellitus ([address] ), Elective , H/O gestational diabetes mellitus, not currently pregnant (2019), Hypertension, Menorrhagia with regular cycle (2018), Migraine, Morbid obesity with BMI of 45 0-49 9, adult ([address] ), Prediabetes (2021), and Varicella  ,  _______________________________________________________________________  Medical Problems:  does not have any pertinent problems on file ,  _______________________________________________________________________  Past Surgical History:   has a past surgical history that includes Induced   ,  _______________________________________________________________________  Family History:  family history includes Asthma in her brother, brother, brother, sister, sister, and sister; Breast cancer in her mother; Cancer in her mother; Diabetes in her father and paternal grandmother; Heart disease in her mother; Hypertension in her mother; Other in her mother; Stroke in her mother ,  _______________________________________________________________________  Social History:   reports that she has never smoked  She has never used smokeless tobacco  She reports that she does not drink alcohol and does not use drugs  ,  _______________________________________________________________________  Allergies:  has No Known Allergies     _______________________________________________________________________  Current Outpatient Medications   Medication Sig Dispense Refill    amLODIPine (NORVASC) 5 mg tablet Take 1 tablet (5 mg total) by mouth daily 90 tablet 1    levonorgestrel (MIRENA) 20 MCG/24HR IUD 1 each by Intrauterine route once      lisinopril (ZESTRIL) 10 mg tablet Take 1 tablet (10 mg total) by mouth daily 90 tablet 1    metFORMIN (GLUCOPHAGE-XR) 750 mg 24 hr tablet Take 1 tablet (750 mg total) by mouth daily with dinner 90 tablet 1    phentermine 30 MG capsule Take 1 capsule (30 mg total) by mouth every morning 30 capsule 0    topiramate (TOPAMAX) 50 MG tablet Take 1 tablet at night 90 tablet 1     No current facility-administered medications for this visit      _______________________________________________________________________  Review of Systems   Constitutional: Negative for chills and fever     HENT: Negative for ear pain and sore throat  Eyes: Negative for pain and visual disturbance  Respiratory: Negative for cough and shortness of breath  Cardiovascular: Negative for chest pain and palpitations  Gastrointestinal: Negative for abdominal pain and vomiting  Genitourinary: Negative for dysuria and hematuria  Musculoskeletal: Positive for arthralgias  Negative for back pain  Skin: Negative for color change and rash  Neurological: Negative for seizures and syncope  All other systems reviewed and are negative  Objective:  Vitals:    09/21/22 0902   BP: 128/88   BP Location: Left arm   Patient Position: Sitting   Cuff Size: Large   Pulse: 97   Temp: (!) 97 3 °F (36 3 °C)   TempSrc: Tympanic   SpO2: 97%   Weight: 119 kg (263 lb 6 4 oz)   Height: 5' 6" (1 676 m)     Body mass index is 42 51 kg/m²  Physical Exam  Vitals and nursing note reviewed  Constitutional:       General: She is not in acute distress  Appearance: Normal appearance  She is obese  She is not ill-appearing  Cardiovascular:      Rate and Rhythm: Normal rate and regular rhythm  Pulses: Normal pulses  Heart sounds: Normal heart sounds  Pulmonary:      Effort: Pulmonary effort is normal  No respiratory distress  Breath sounds: Normal breath sounds  No wheezing  Musculoskeletal:         General: Normal range of motion  Skin:     General: Skin is warm and dry  Neurological:      Mental Status: She is alert and oriented to person, place, and time  Psychiatric:         Mood and Affect: Mood normal          Behavior: Behavior normal          Thought Content:  Thought content normal          Judgment: Judgment normal

## 2022-09-21 NOTE — ASSESSMENT & PLAN NOTE
Continue to stay active and walk on a track, continue to eat 1200 calorie diet daily, continue to drink water

## 2022-09-21 NOTE — PATIENT INSTRUCTIONS
Plantar Fasciitis   AMBULATORY CARE:   Plantar fasciitis  PF is swelling of the plantar fascia  The plantar fascia is a thick band of tissue that connects your heel bone to your toes  This part of your foot helps support the arch of your foot and absorbs shock  Tension and stress can cause small tears on the thick band of tissue  These small tears can grow larger with repeated stretching and tearing  The band of tissue can become swollen and painful  Signs and symptoms:   Pain on the bottom of your foot near your heel    Pain that is worse right after you get out of bed or after a long period of rest    Pain after activity    Stiffness in your foot    Heel swelling    Call your doctor if:   Your pain or swelling suddenly increase  You develop knee, hip, or back pain  You have questions or concerns about your condition or care  Treatment  may include any of the following:  Medicines  may be given to decrease swelling and pain  Shoe inserts, splints, or tape  help support your foot and decrease stress on your plantar fascia  A night splint may help stretch your plantar fascia while you sleep  Stretches and exercises  can help decrease pain and swelling  They can also help strengthen the muscles that support your heel and foot  Surgery  may be needed when other treatments do not work  During surgery, the plantar fascia is  from your heel  Self-care:   Wear your splint or shoe inserts as directed  You may need to wear a splint at night to keep your foot stretched while you sleep  This will help prevent sharp pain first thing in the morning  Shoe inserts will help decrease stress on your plantar fascia when you walk or exercise  Apply ice on your plantar fascia  Ice helps prevent tissue damage and decreases swelling and pain  Fill a water bottle with water and freeze it  Wrap a towel around the bottle or cover it with a pillow case   Roll the water bottle under your foot for 10 minutes in the morning and after work  Massage your plantar fascia as directed  This may help decrease swelling and pain  Roll a golf ball under your foot for 10 minutes  Repeat 3 times each day  Go to physical therapy as directed  A physical therapist teaches you exercises to help improve movement and strength, and to decrease pain  Prevent plantar fasciitis:   Maintain a healthy weight  This will help decrease stress on your feet  Ask your healthcare provider how much you should weigh  Ask him to help you create a weight loss plan if you are overweight  Do low-impact exercises  Low-impact exercises decrease stress on your plantar fascia  Examples include swimming or bicycling  Start new activities slowly  Increase the intensity and time gradually  Wear shoes that fit well and support your arch  Replace your shoes before the padding or shock absorption wears out  Do not walk or  bare feet or sandals for long periods of time  Follow up with your doctor as directed:  Write down your questions so you remember to ask them during your visits  © [employer] 2022 Information is for End User's use only and may not be sold, redistributed or otherwise used for commercial purposes  All illustrations and images included in CareNotes® are the copyrighted property of A D A M , Inc  or [address] [place] [address]  The above information is an  only  It is not intended as medical advice for individual conditions or treatments  Talk to your doctor, nurse or pharmacist before following any medical regimen to see if it is safe and effective for you    Weight Loss      Create healthy habits:    Aim for 1200 calories a day - track it   Drink plenty of water - half of your body weight in oz   Aim for 8h of sleep per night   Exercise daily - at least 30min per day     Some of the weight loss apps to look into:    Lose It! -- free weight loss vivian builds a custom nutrition plan for your weight loss goals  It uses your information to create a daily calorie guide and has a food tracking log so you can keep track of your progress  MyFitnessPal --free weight loss vivian to track calories and food intake  Their food tracking database boats over 5 million existing food options to choose from, and they integrate restaurants too  Going out to eat can make food logging incredibly difficult, and MyFitnessPal helps make that process a little bit easier  StepBet -- is another free weight loss vivian, but this one has a twist  Instead of simply logging your own activity, in StepBet, you join fitness challenges and bet  Thats right, you duque on your own weight loss success rate  For those of us who need some extra motivation and accountability, this is a pretty cool concept  Fooducate -- is a free weight loss vivian that heads to the root of weight gain, the grocery store  While a lot of other apps focus on exercise and food tracking, Fooducate is one of the few trying to help you make better food choices from the beginning  You can scan items while youre at the store to check out information on nutrition and warnings, like gross ingredients that are hidden under other names  [employer]at[employer] -- is the weight loss vivian that pays you to lose weight  S[employer]coin tracks all of your outdoor steps toward rewards  They have their own points currency and you can earn things like subscriptions to health related apps, like Calm, or even help from a virtual   If point systems get you motivated, [employer] may be the perfect free weight loss vivian for you

## 2022-09-22 DIAGNOSIS — E66.01 MORBID OBESITY (HCC): ICD-10-CM

## 2022-09-22 DIAGNOSIS — R63.5 WEIGHT GAIN: ICD-10-CM

## 2022-09-22 RX ORDER — PHENTERMINE HYDROCHLORIDE 30 MG/1
30 CAPSULE ORAL EVERY MORNING
Qty: 30 CAPSULE | Refills: 0 | Status: SHIPPED | OUTPATIENT
Start: 2022-09-22 | End: 2022-10-21 | Stop reason: SDUPTHER

## 2022-10-17 ENCOUNTER — APPOINTMENT (OUTPATIENT)
Dept: LAB | Facility: HOSPITAL | Age: 37
End: 2022-10-17
Payer: COMMERCIAL

## 2022-10-17 DIAGNOSIS — E66.01 MORBID OBESITY (HCC): ICD-10-CM

## 2022-10-17 DIAGNOSIS — R73.03 PREDIABETES: ICD-10-CM

## 2022-10-17 DIAGNOSIS — I10 ESSENTIAL HYPERTENSION: ICD-10-CM

## 2022-10-17 LAB
ALBUMIN SERPL BCP-MCNC: 3.7 G/DL (ref 3.5–5)
ALP SERPL-CCNC: 82 U/L (ref 46–116)
ALT SERPL W P-5'-P-CCNC: 36 U/L (ref 12–78)
ANION GAP SERPL CALCULATED.3IONS-SCNC: 6 MMOL/L (ref 4–13)
AST SERPL W P-5'-P-CCNC: 19 U/L (ref 5–45)
BASOPHILS # BLD AUTO: 0.02 THOUSANDS/ΜL (ref 0–0.1)
BASOPHILS NFR BLD AUTO: 1 % (ref 0–1)
BILIRUB SERPL-MCNC: 0.55 MG/DL (ref 0.2–1)
BUN SERPL-MCNC: 11 MG/DL (ref 5–25)
CALCIUM SERPL-MCNC: 9.3 MG/DL (ref 8.3–10.1)
CHLORIDE SERPL-SCNC: 101 MMOL/L (ref 96–108)
CHOLEST SERPL-MCNC: 194 MG/DL
CO2 SERPL-SCNC: 30 MMOL/L (ref 21–32)
CREAT SERPL-MCNC: 0.98 MG/DL (ref 0.6–1.3)
EOSINOPHIL # BLD AUTO: 0.04 THOUSAND/ΜL (ref 0–0.61)
EOSINOPHIL NFR BLD AUTO: 1 % (ref 0–6)
ERYTHROCYTE [DISTWIDTH] IN BLOOD BY AUTOMATED COUNT: 12.4 % (ref 11.6–15.1)
GFR SERPL CREATININE-BSD FRML MDRD: 74 ML/MIN/1.73SQ M
GLUCOSE P FAST SERPL-MCNC: 97 MG/DL (ref 65–99)
HCT VFR BLD AUTO: 46.6 % (ref 34.8–46.1)
HDLC SERPL-MCNC: 52 MG/DL
HGB BLD-MCNC: 14.4 G/DL (ref 11.5–15.4)
IMM GRANULOCYTES # BLD AUTO: 0.01 THOUSAND/UL (ref 0–0.2)
IMM GRANULOCYTES NFR BLD AUTO: 0 % (ref 0–2)
LDLC SERPL CALC-MCNC: 127 MG/DL (ref 0–100)
LYMPHOCYTES # BLD AUTO: 1.86 THOUSANDS/ΜL (ref 0.6–4.47)
LYMPHOCYTES NFR BLD AUTO: 42 % (ref 14–44)
MCH RBC QN AUTO: 27.4 PG (ref 26.8–34.3)
MCHC RBC AUTO-ENTMCNC: 30.9 G/DL (ref 31.4–37.4)
MCV RBC AUTO: 89 FL (ref 82–98)
MONOCYTES # BLD AUTO: 0.36 THOUSAND/ΜL (ref 0.17–1.22)
MONOCYTES NFR BLD AUTO: 8 % (ref 4–12)
NEUTROPHILS # BLD AUTO: 2.12 THOUSANDS/ΜL (ref 1.85–7.62)
NEUTS SEG NFR BLD AUTO: 48 % (ref 43–75)
NONHDLC SERPL-MCNC: 142 MG/DL
NRBC BLD AUTO-RTO: 0 /100 WBCS
PLATELET # BLD AUTO: 193 THOUSANDS/UL (ref 149–390)
PMV BLD AUTO: 13.4 FL (ref 8.9–12.7)
POTASSIUM SERPL-SCNC: 3.9 MMOL/L (ref 3.5–5.3)
PROT SERPL-MCNC: 7.9 G/DL (ref 6.4–8.4)
RBC # BLD AUTO: 5.26 MILLION/UL (ref 3.81–5.12)
SODIUM SERPL-SCNC: 137 MMOL/L (ref 135–147)
TRIGL SERPL-MCNC: 75 MG/DL
WBC # BLD AUTO: 4.41 THOUSAND/UL (ref 4.31–10.16)

## 2022-10-17 PROCEDURE — 80053 COMPREHEN METABOLIC PANEL: CPT

## 2022-10-17 PROCEDURE — 36415 COLL VENOUS BLD VENIPUNCTURE: CPT

## 2022-10-17 PROCEDURE — 83036 HEMOGLOBIN GLYCOSYLATED A1C: CPT

## 2022-10-17 PROCEDURE — 80061 LIPID PANEL: CPT

## 2022-10-17 PROCEDURE — 85025 COMPLETE CBC W/AUTO DIFF WBC: CPT

## 2022-10-18 LAB
EST. AVERAGE GLUCOSE BLD GHB EST-MCNC: 123 MG/DL
HBA1C MFR BLD: 5.9 %

## 2022-10-21 ENCOUNTER — OFFICE VISIT (OUTPATIENT)
Dept: FAMILY MEDICINE CLINIC | Facility: CLINIC | Age: 37
End: 2022-10-21
Payer: COMMERCIAL

## 2022-10-21 VITALS
SYSTOLIC BLOOD PRESSURE: 130 MMHG | HEART RATE: 102 BPM | OXYGEN SATURATION: 98 % | TEMPERATURE: 98.2 F | WEIGHT: 253.8 LBS | BODY MASS INDEX: 40.79 KG/M2 | DIASTOLIC BLOOD PRESSURE: 100 MMHG | HEIGHT: 66 IN | RESPIRATION RATE: 12 BRPM

## 2022-10-21 DIAGNOSIS — E66.01 MORBID OBESITY (HCC): ICD-10-CM

## 2022-10-21 DIAGNOSIS — R63.5 WEIGHT GAIN: Primary | ICD-10-CM

## 2022-10-21 DIAGNOSIS — R73.03 PREDIABETES: ICD-10-CM

## 2022-10-21 DIAGNOSIS — I10 ESSENTIAL HYPERTENSION: ICD-10-CM

## 2022-10-21 PROCEDURE — 99214 OFFICE O/P EST MOD 30 MIN: CPT

## 2022-10-21 RX ORDER — PHENTERMINE HYDROCHLORIDE 30 MG/1
30 CAPSULE ORAL EVERY MORNING
Qty: 30 CAPSULE | Refills: 0 | Status: SHIPPED | OUTPATIENT
Start: 2022-10-21

## 2022-10-21 NOTE — PROGRESS NOTES
Assessment/Plan:       Problem List Items Addressed This Visit        Cardiovascular and Mediastinum    Essential hypertension     Increased today, but did not take her blood pressure medicine yesterday and today morning  She will check her blood pressure every day for the next week and let me know about the numbers  Discussed the importance of blood pressure medication adherence  Will continue to monitor            Other    Adult BMI 40 0-44 9 kg/sq m [facility])     Doing great on phentermine, continue to take 30 mg of phentermine daily, increase water intake  Relevant Medications    phentermine 30 MG capsule    Prediabetes     Improvement in A1c, continue to take metformin daily, continue to eat healthy  Morbid obesity ([facility] [address] )     Continue to take 30 mg of phentermine daily, continue to make healthy food choices and exercise  Relevant Medications    phentermine 30 MG capsule    Weight gain - Primary     10 lb weight loss since the last appointment- doing great!!         Relevant Medications    phentermine 30 MG capsule            Subjective:      Patient ID: Lorrie Cohen is a 40 y o  female  Patient presents to office for weight loss follow-up  She has been taking 30 mg of phentermine daily  Recent weight loss of 10 lb since the last appointment  She has not been consistent with taking her blood pressure medication  Discussed importance of medication adherence, patient in agreement  She will check her blood pressure daily for the next week and right knee back with her numbers  She is denying any chest pain, shortness a breath, increase in headaches or dizziness  Recent blood work reviewed         The following portions of the patient's history were reviewed and updated as appropriate:   Past Medical History:  She has a past medical history of Asthma, Diabetes mellitus ([facility] [address] ), Elective , H/O gestational diabetes mellitus, not currently pregnant (2019), Hypertension, Menorrhagia with regular cycle (2018), Migraine, Morbid obesity with BMI of 45 0-49 9, adult ([address] ), Prediabetes (2021), and Varicella  ,  _______________________________________________________________________  Medical Problems:  does not have any pertinent problems on file ,  _______________________________________________________________________  Past Surgical History:   has a past surgical history that includes Induced   ,  _______________________________________________________________________  Family History:  family history includes Asthma in her brother, brother, brother, sister, sister, and sister; Breast cancer in her mother; Cancer in her mother; Diabetes in her father and paternal grandmother; Heart disease in her mother; Hypertension in her mother; Other in her mother; Stroke in her mother ,  _______________________________________________________________________  Social History:   reports that she has never smoked  She has never used smokeless tobacco  She reports that she does not drink alcohol and does not use drugs  ,  _______________________________________________________________________  Allergies:  has No Known Allergies     _______________________________________________________________________  Current Outpatient Medications   Medication Sig Dispense Refill   • amLODIPine (NORVASC) 5 mg tablet Take 1 tablet (5 mg total) by mouth daily 90 tablet 1   • levonorgestrel (MIRENA) 20 MCG/24HR IUD 1 each by Intrauterine route once     • lisinopril (ZESTRIL) 10 mg tablet Take 1 tablet (10 mg total) by mouth daily 90 tablet 1   • metFORMIN (GLUCOPHAGE-XR) 750 mg 24 hr tablet Take 1 tablet (750 mg total) by mouth daily with dinner 90 tablet 1   • phentermine 30 MG capsule Take 1 capsule (30 mg total) by mouth every morning 30 capsule 0   • topiramate (TOPAMAX) 50 MG tablet Take 1 tablet at night 90 tablet 1     No current facility-administered medications for this visit  _______________________________________________________________________  Review of Systems   Constitutional: Negative for chills and fever  HENT: Negative for ear pain and sore throat  Eyes: Negative for pain and visual disturbance  Respiratory: Negative for cough and shortness of breath  Cardiovascular: Negative for chest pain and palpitations  Gastrointestinal: Negative for abdominal pain and vomiting  Genitourinary: Negative for dysuria and hematuria  Musculoskeletal: Negative for arthralgias and back pain  Skin: Negative for color change and rash  Neurological: Positive for headaches (Controlled with Topamax)  Negative for seizures and syncope  All other systems reviewed and are negative  Objective:  Vitals:    10/21/22 0847   BP: 130/100   Pulse: 102   Resp: 12   Temp: 98 2 °F (36 8 °C)   SpO2: 98%   Weight: 115 kg (253 lb 12 8 oz)   Height: 5' 6" (1 676 m)     Body mass index is 40 96 kg/m²  Physical Exam  Vitals and nursing note reviewed  Constitutional:       General: She is not in acute distress  Appearance: Normal appearance  She is obese  She is not ill-appearing  Cardiovascular:      Rate and Rhythm: Normal rate and regular rhythm  Pulses: Normal pulses  Heart sounds: Normal heart sounds  Pulmonary:      Effort: Pulmonary effort is normal  No respiratory distress  Breath sounds: Normal breath sounds  No wheezing  Musculoskeletal:         General: Normal range of motion  Skin:     General: Skin is warm and dry  Neurological:      Mental Status: She is alert and oriented to person, place, and time  Psychiatric:         Mood and Affect: Mood normal          Behavior: Behavior normal          Thought Content:  Thought content normal          Judgment: Judgment normal

## 2022-10-21 NOTE — ASSESSMENT & PLAN NOTE
Increased today, but did not take her blood pressure medicine yesterday and today morning  She will check her blood pressure every day for the next week and let me know about the numbers  Discussed the importance of blood pressure medication adherence    Will continue to monitor

## 2022-11-07 DIAGNOSIS — R63.5 WEIGHT GAIN: ICD-10-CM

## 2022-11-07 RX ORDER — PHENTERMINE HYDROCHLORIDE 15 MG/1
30 CAPSULE ORAL EVERY MORNING
Qty: 60 CAPSULE | Refills: 0 | Status: SHIPPED | OUTPATIENT
Start: 2022-11-07 | End: 2023-01-06

## 2022-11-11 ENCOUNTER — TELEPHONE (OUTPATIENT)
Dept: FAMILY MEDICINE CLINIC | Facility: CLINIC | Age: 37
End: 2022-11-11

## 2022-11-11 NOTE — TELEPHONE ENCOUNTER
Record Request from ASHELY Cavazos/Alexus    Faxed to [facility]

## 2022-12-05 ENCOUNTER — OFFICE VISIT (OUTPATIENT)
Dept: FAMILY MEDICINE CLINIC | Facility: CLINIC | Age: 37
End: 2022-12-05

## 2022-12-05 VITALS
BODY MASS INDEX: 40.66 KG/M2 | DIASTOLIC BLOOD PRESSURE: 80 MMHG | HEART RATE: 95 BPM | TEMPERATURE: 97.3 F | OXYGEN SATURATION: 98 % | SYSTOLIC BLOOD PRESSURE: 122 MMHG | HEIGHT: 66 IN | WEIGHT: 253 LBS

## 2022-12-05 DIAGNOSIS — J06.9 UPPER RESPIRATORY TRACT INFECTION, UNSPECIFIED TYPE: Primary | ICD-10-CM

## 2022-12-05 RX ORDER — DEXTROMETHORPHAN HYDROBROMIDE AND PROMETHAZINE HYDROCHLORIDE 15; 6.25 MG/5ML; MG/5ML
5 SOLUTION ORAL 4 TIMES DAILY PRN
Qty: 180 ML | Refills: 0 | Status: SHIPPED | OUTPATIENT
Start: 2022-12-05

## 2022-12-05 RX ORDER — AZITHROMYCIN 250 MG/1
TABLET, FILM COATED ORAL
Qty: 6 TABLET | Refills: 0 | Status: SHIPPED | OUTPATIENT
Start: 2022-12-05 | End: 2022-12-09

## 2022-12-05 NOTE — PROGRESS NOTES
Assessment/Plan:     Chronic Problems:  No problem-specific Assessment & Plan notes found for this encounter  Visit Diagnosis:  Diagnoses and all orders for this visit:    Upper respiratory tract infection, unspecified type  -     azithromycin (ZITHROMAX) 250 mg tablet; Take 2 tablets today then 1 tablet daily x 4 days  -     Promethazine-DM (PHENERGAN-DM) 6 25-15 mg/5 mL oral syrup; Take 5 mL by mouth 4 (four) times a day as needed for cough  -     Covid/Flu- Office Collect    Discussed plan of care reviewed potential causative factors  Screen for flu/COVID discussed RSV viral entities    Increase oral hydration, warm tea with honey, increase nutrition   Adequate rest  Tylenol or Motrin for pain and/or fever  Nasal mist  Humidify room  Use decongestant- Mucinex  Zinc lozenges   Good handwashing  Call for any changes, failure to improve    Subjective:    Patient ID: Howie De Guzman is a 40 y o  female  Cough   3-4 day   Son with illness and father with the flu   covid -   kalagari guess    Cough  This is a new problem  The current episode started in the past 7 days  The problem has been gradually worsening  The problem occurs constantly  The cough is productive of sputum  Pertinent negatives include no chills, fever, nasal congestion, postnasal drip, rhinorrhea, shortness of breath or wheezing  She has tried nothing for the symptoms  Her past medical history is significant for asthma  The following portions of the patient's history were reviewed and updated as appropriate: allergies, current medications, past family history, past medical history, past social history, past surgical history and problem list     Review of Systems   Constitutional: Negative for chills and fever  HENT: Positive for congestion  Negative for postnasal drip and rhinorrhea  Respiratory: Positive for cough  Negative for shortness of breath and wheezing            /80   Pulse 95   Temp (!) 97 3 °F (36 3 °C) Ht 5' 6" (1 676 m)   Wt 115 kg (253 lb)   SpO2 98%   BMI 40 84 kg/m²   Social History     Socioeconomic History   • Marital status: /Civil Union     Spouse name: Not on file   • Number of children: Not on file   • Years of education: Not on file   • Highest education level: Not on file   Occupational History   • Not on file   Tobacco Use   • Smoking status: Never   • Smokeless tobacco: Never   Vaping Use   • Vaping Use: Never used   Substance and Sexual Activity   • Alcohol use: No   • Drug use: No   • Sexual activity: Yes     Partners: Male     Birth control/protection: I U D       Comment:    Other Topics Concern   • Not on file   Social History Narrative    Always uses seat belt     Social Determinants of Health     Financial Resource Strain: Not on file   Food Insecurity: Not on file   Transportation Needs: Not on file   Physical Activity: Not on file   Stress: Not on file   Social Connections: Not on file   Intimate Partner Violence: Not on file   Housing Stability: Not on file     Past Medical History:   Diagnosis Date   • Asthma     childhood   • Diabetes mellitus ([facility] )     gestational   • Elective      Resolved 2016    • H/O gestational diabetes mellitus, not currently pregnant 2019   • Hypertension    • Menorrhagia with regular cycle 2018   • Migraine    • Morbid obesity with BMI of 45 0-49 9, adult ([facility] [address] )    • Prediabetes 2021   • Varicella     childhood     Family History   Problem Relation Age of Onset   • Cancer Mother    • Heart disease Mother    • Stroke Mother    • Other Mother    • Hypertension Mother    • Breast cancer Mother    • Diabetes Father    • Asthma Sister    • Asthma Brother    • Diabetes Paternal Grandmother    • Asthma Brother    • Asthma Brother    • Asthma Sister    • Asthma Sister      Past Surgical History:   Procedure Laterality Date   • INDUCED          Current Outpatient Medications:   •  amLODIPine (NORVASC) 5 mg tablet, Take 1 tablet (5 mg total) by mouth daily, Disp: 90 tablet, Rfl: 1  •  azithromycin (ZITHROMAX) 250 mg tablet, Take 2 tablets today then 1 tablet daily x 4 days, Disp: 6 tablet, Rfl: 0  •  levonorgestrel (MIRENA) 20 MCG/24HR IUD, 1 each by Intrauterine route once, Disp: , Rfl:   •  lisinopril (ZESTRIL) 10 mg tablet, Take 1 tablet (10 mg total) by mouth daily, Disp: 90 tablet, Rfl: 1  •  metFORMIN (GLUCOPHAGE-XR) 750 mg 24 hr tablet, Take 1 tablet (750 mg total) by mouth daily with dinner, Disp: 90 tablet, Rfl: 1  •  phentermine 15 MG capsule, Take 2 capsules (30 mg total) by mouth every morning, Disp: 60 capsule, Rfl: 0  •  Promethazine-DM (PHENERGAN-DM) 6 25-15 mg/5 mL oral syrup, Take 5 mL by mouth 4 (four) times a day as needed for cough, Disp: 180 mL, Rfl: 0  •  topiramate (TOPAMAX) 50 MG tablet, Take 1 tablet at night, Disp: 90 tablet, Rfl: 1    No Known Allergies       Lab Review   Appointment on 10/17/2022   Component Date Value   • WBC 10/17/2022 4 41    • RBC 10/17/2022 5 26 (H)    • Hemoglobin 10/17/2022 14 4    • Hematocrit 10/17/2022 46 6 (H)    • MCV 10/17/2022 89    • MCH 10/17/2022 27 4    • MCHC 10/17/2022 30 9 (L)    • RDW 10/17/2022 12 4    • MPV 10/17/2022 13 4 (H)    • Platelets 02/95/1593 193    • nRBC 10/17/2022 0    • Neutrophils Relative 10/17/2022 48    • Immat GRANS % 10/17/2022 0    • Lymphocytes Relative 10/17/2022 42    • Monocytes Relative 10/17/2022 8    • Eosinophils Relative 10/17/2022 1    • Basophils Relative 10/17/2022 1    • Neutrophils Absolute 10/17/2022 2 12    • Immature Grans Absolute 10/17/2022 0 01    • Lymphocytes Absolute 10/17/2022 1 86    • Monocytes Absolute 10/17/2022 0 36    • Eosinophils Absolute 10/17/2022 0 04    • Basophils Absolute 10/17/2022 0 02    • Sodium 10/17/2022 137    • Potassium 10/17/2022 3 9    • Chloride 10/17/2022 101    • CO2 10/17/2022 30    • ANION GAP 10/17/2022 6    • BUN 10/17/2022 11    • Creatinine 10/17/2022 0 98    • Glucose, Fasting 10/17/2022 97    • Calcium 10/17/2022 9 3    • AST 10/17/2022 19    • ALT 10/17/2022 36    • Alkaline Phosphatase 10/17/2022 82    • Total Protein 10/17/2022 7 9    • Albumin 10/17/2022 3 7    • Total Bilirubin 10/17/2022 0 55    • eGFR 10/17/2022 74    • Hemoglobin A1C 10/17/2022 5 9 (H)    • EAG 10/17/2022 123    • Cholesterol 10/17/2022 194    • Triglycerides 10/17/2022 75    • HDL, Direct 10/17/2022 52    • LDL Calculated 10/17/2022 127 (H)    • Non-HDL-Chol (CHOL-HDL) 10/17/2022 142    Office Visit on 07/22/2022   Component Date Value   • Bacterial Vaginosis 07/22/2022 Negative    • Candida species 07/22/2022 Positive (A)    • Candida glabrata 07/22/2022 Negative    • Joann krusei 07/22/2022 Negative    • Trichomonas vaginalis 07/22/2022 Negative    Office Visit on 07/14/2022   Component Date Value   • Yeast, Wet Prep 07/14/2022 Neg    • pH 07/14/2022 4 0    • Whiff Test 07/14/2022 Neg    • Clue Cells 07/14/2022 Neg    • Trich, Wet Prep 07/14/2022 Neg    • N gonorrhoeae, DNA Probe 07/14/2022 Negative    • Chlamydia trachomatis, D* 07/14/2022 Positive (A)    Annual Exam on 06/13/2022   Component Date Value   • N gonorrhoeae, DNA Probe 06/13/2022 Negative    • Chlamydia trachomatis, D* 06/13/2022 Negative    • Trichomonas vaginosis 06/13/2022 Positive (A)         Imaging: No results found  Objective:     Physical Exam  Constitutional:       General: She is not in acute distress  Appearance: She is well-developed and well-nourished  She is obese  She is not ill-appearing or toxic-appearing  HENT:      Head: Normocephalic and atraumatic  Right Ear: Tympanic membrane normal       Left Ear: Tympanic membrane normal       Nose: Nose normal       Mouth/Throat:      Pharynx: Posterior oropharyngeal erythema present  Eyes:      Extraocular Movements: EOM normal    Cardiovascular:      Rate and Rhythm: Normal rate and regular rhythm  Heart sounds: Normal heart sounds     Pulmonary:      Effort: Pulmonary effort is normal       Breath sounds: Normal breath sounds  No wheezing  Musculoskeletal:         General: Normal range of motion  Cervical back: Normal range of motion and neck supple  Skin:     General: Skin is warm and dry  Neurological:      Mental Status: She is alert and oriented to person, place, and time  Deep Tendon Reflexes: Reflexes are normal and symmetric  Psychiatric:         Mood and Affect: Mood and affect normal          Behavior: Behavior normal          Thought Content: Thought content normal          Judgment: Judgment normal            There are no Patient Instructions on file for this visit  SAGRARIO Salmon    Portions of the record may have been created with voice recognition software  Occasional wrong word or "sound a like" substitutions may have occurred due to the inherent limitations of voice recognition software  Read the chart carefully and recognize, using context, where substitutions have occurred

## 2022-12-06 LAB
FLUAV RNA RESP QL NAA+PROBE: NEGATIVE
FLUBV RNA RESP QL NAA+PROBE: NEGATIVE
SARS-COV-2 RNA RESP QL NAA+PROBE: NEGATIVE

## 2023-01-06 ENCOUNTER — OFFICE VISIT (OUTPATIENT)
Dept: FAMILY MEDICINE CLINIC | Facility: CLINIC | Age: 38
End: 2023-01-06

## 2023-01-06 VITALS
WEIGHT: 259 LBS | HEIGHT: 66 IN | RESPIRATION RATE: 16 BRPM | DIASTOLIC BLOOD PRESSURE: 92 MMHG | SYSTOLIC BLOOD PRESSURE: 138 MMHG | HEART RATE: 94 BPM | BODY MASS INDEX: 41.62 KG/M2 | TEMPERATURE: 98 F

## 2023-01-06 DIAGNOSIS — R63.5 WEIGHT GAIN: ICD-10-CM

## 2023-01-06 DIAGNOSIS — R73.03 PREDIABETES: ICD-10-CM

## 2023-01-06 DIAGNOSIS — I10 ESSENTIAL HYPERTENSION: Primary | ICD-10-CM

## 2023-01-06 RX ORDER — PHENTERMINE HYDROCHLORIDE 37.5 MG/1
37.5 CAPSULE ORAL EVERY MORNING
Qty: 30 CAPSULE | Refills: 0 | Status: SHIPPED | OUTPATIENT
Start: 2023-01-06

## 2023-01-06 RX ORDER — AMLODIPINE BESYLATE 5 MG/1
5 TABLET ORAL DAILY
Qty: 90 TABLET | Refills: 1 | Status: SHIPPED | OUTPATIENT
Start: 2023-01-06

## 2023-01-06 RX ORDER — LISINOPRIL 10 MG/1
10 TABLET ORAL DAILY
Qty: 90 TABLET | Refills: 1 | Status: SHIPPED | OUTPATIENT
Start: 2023-01-06

## 2023-01-06 RX ORDER — METFORMIN HYDROCHLORIDE 750 MG/1
750 TABLET, EXTENDED RELEASE ORAL
Qty: 90 TABLET | Refills: 1 | Status: SHIPPED | OUTPATIENT
Start: 2023-01-06

## 2023-01-06 NOTE — PROGRESS NOTES
Assessment/Plan:         Problem List Items Addressed This Visit        Cardiovascular and Mediastinum    Essential hypertension - Primary     Borderline today, continue to take lisinopril 10 mg daily  Check your blood pressure daily, write down the numbers and bring them with you to the next appointment  Mid salt intake  Relevant Medications    lisinopril (ZESTRIL) 10 mg tablet    amLODIPine (NORVASC) 5 mg tablet       Other    Adult BMI 40 0-44 9 kg/sq m (HCC)     5 pounds weight gain since last appointment  Continue to make healthy food choices, reevaluate protein shakes as contain hidden sugars  Continue to walk daily  Relevant Medications    phentermine 37 5 MG capsule    Prediabetes     Continue with metformin 750 mg daily with dinner         Relevant Medications    metFORMIN (GLUCOPHAGE-XR) 750 mg 24 hr tablet    Weight gain     Continue to take phentermine daily  Side effects discussed  Relevant Medications    phentermine 37 5 MG capsule         Subjective:      Patient ID: Kaylan Harkins is a 40 y o  female  Patient presents to the office for weight loss follow-up, she was out of the phentermine for about a month due to it not being available  States that it was harder to stick to the diet through the holidays, recent gain of 5 pounds  Denying any chest pain, palpitations, shortness of breath  The following portions of the patient's history were reviewed and updated as appropriate:   Past Medical History:  She has a past medical history of Asthma, Diabetes mellitus ([address] ), Elective , H/O gestational diabetes mellitus, not currently pregnant (2019), Hypertension, Menorrhagia with regular cycle (2018), Migraine, Morbid obesity with BMI of 45 0-49 9, adult ([address] ), Prediabetes (2021), and Varicella  ,  _______________________________________________________________________  Medical Problems:  does not have any pertinent problems on file ,  _______________________________________________________________________  Past Surgical History:   has a past surgical history that includes Induced   ,  _______________________________________________________________________  Family History:  family history includes Asthma in her brother, brother, brother, sister, sister, and sister; Breast cancer in her mother; Cancer in her mother; Diabetes in her father and paternal grandmother; Heart disease in her mother; Hypertension in her mother; Other in her mother; Stroke in her mother ,  _______________________________________________________________________  Social History:   reports that she has never smoked  She has never used smokeless tobacco  She reports that she does not drink alcohol and does not use drugs  ,  _______________________________________________________________________  Allergies:  has No Known Allergies     _______________________________________________________________________  Current Outpatient Medications   Medication Sig Dispense Refill   • amLODIPine (NORVASC) 5 mg tablet Take 1 tablet (5 mg total) by mouth daily 90 tablet 1   • levonorgestrel (MIRENA) 20 MCG/24HR IUD 1 each by Intrauterine route once     • lisinopril (ZESTRIL) 10 mg tablet Take 1 tablet (10 mg total) by mouth daily 90 tablet 1   • metFORMIN (GLUCOPHAGE-XR) 750 mg 24 hr tablet Take 1 tablet (750 mg total) by mouth daily with dinner 90 tablet 1   • phentermine 37 5 MG capsule Take 1 capsule (37 5 mg total) by mouth every morning 30 capsule 0   • topiramate (TOPAMAX) 50 MG tablet Take 1 tablet at night 90 tablet 1   • Promethazine-DM (PHENERGAN-DM) 6 25-15 mg/5 mL oral syrup Take 5 mL by mouth 4 (four) times a day as needed for cough 180 mL 0     No current facility-administered medications for this visit      _______________________________________________________________________  Review of Systems   Constitutional: Negative for chills and fever     Respiratory: Negative for cough and shortness of breath  Cardiovascular: Negative for chest pain  Gastrointestinal: Negative for abdominal pain and vomiting  Genitourinary: Negative for dysuria  Musculoskeletal: Negative for arthralgias and back pain  Neurological: Negative for headaches  All other systems reviewed and are negative  Objective:  Vitals:    01/06/23 0953   BP: 138/92   Pulse: 94   Resp: 16   Temp: 98 °F (36 7 °C)   Weight: 117 kg (259 lb)   Height: 5' 6" (1 676 m)     Body mass index is 41 8 kg/m²  Physical Exam  Vitals and nursing note reviewed  Constitutional:       General: She is not in acute distress  Appearance: Normal appearance  She is obese  She is not ill-appearing  Cardiovascular:      Rate and Rhythm: Normal rate and regular rhythm  Heart sounds: Normal heart sounds  Pulmonary:      Effort: Pulmonary effort is normal       Breath sounds: Normal breath sounds  Musculoskeletal:         General: Normal range of motion  Skin:     General: Skin is warm and dry  Neurological:      Mental Status: She is alert and oriented to person, place, and time  Psychiatric:         Mood and Affect: Mood normal          Behavior: Behavior normal          Thought Content:  Thought content normal          Judgment: Judgment normal

## 2023-01-06 NOTE — ASSESSMENT & PLAN NOTE
Borderline today, continue to take lisinopril 10 mg daily  Check your blood pressure daily, write down the numbers and bring them with you to the next appointment  Mid salt intake

## 2023-01-06 NOTE — ASSESSMENT & PLAN NOTE
5 pounds weight gain since last appointment  Continue to make healthy food choices, reevaluate protein shakes as contain hidden sugars  Continue to walk daily

## 2023-02-03 ENCOUNTER — OFFICE VISIT (OUTPATIENT)
Dept: FAMILY MEDICINE CLINIC | Facility: CLINIC | Age: 38
End: 2023-02-03

## 2023-02-03 VITALS
DIASTOLIC BLOOD PRESSURE: 80 MMHG | WEIGHT: 252.6 LBS | RESPIRATION RATE: 16 BRPM | BODY MASS INDEX: 40.6 KG/M2 | HEIGHT: 66 IN | SYSTOLIC BLOOD PRESSURE: 140 MMHG | TEMPERATURE: 97.2 F | HEART RATE: 90 BPM

## 2023-02-03 DIAGNOSIS — R53.83 FATIGUE, UNSPECIFIED TYPE: ICD-10-CM

## 2023-02-03 DIAGNOSIS — I10 ESSENTIAL HYPERTENSION: ICD-10-CM

## 2023-02-03 DIAGNOSIS — R73.03 PREDIABETES: ICD-10-CM

## 2023-02-03 DIAGNOSIS — R63.5 WEIGHT GAIN: ICD-10-CM

## 2023-02-03 RX ORDER — PHENTERMINE HYDROCHLORIDE 37.5 MG/1
37.5 CAPSULE ORAL EVERY MORNING
Qty: 30 CAPSULE | Refills: 0 | Status: SHIPPED | OUTPATIENT
Start: 2023-02-03

## 2023-02-03 RX ORDER — AMLODIPINE BESYLATE 10 MG/1
10 TABLET ORAL DAILY
Qty: 90 TABLET | Refills: 1 | Status: SHIPPED | OUTPATIENT
Start: 2023-02-03

## 2023-02-03 NOTE — ASSESSMENT & PLAN NOTE
Continue with intermittent fasting and 16h of fast   Continue to make healthy food choices and consider incorporating keto diet

## 2023-02-03 NOTE — ASSESSMENT & PLAN NOTE
Elevated blood pressure today  Discussed medication adjustments  Will increase amlodipine to 10 mg daily  Patient in agreement

## 2023-02-03 NOTE — ASSESSMENT & PLAN NOTE
Continues to stay active and continue with intermittent fasting  Sitter increasing your calorie intake to 1200 víctor daily in order to avoid starvation phase and decreasing metabolism

## 2023-02-03 NOTE — PROGRESS NOTES
Assessment/Plan:         Problem List Items Addressed This Visit        Cardiovascular and Mediastinum    Essential hypertension     Elevated blood pressure today  Discussed medication adjustments  Will increase amlodipine to 10 mg daily  Patient in agreement  Relevant Medications    amLODIPine (NORVASC) 10 mg tablet    Other Relevant Orders    CBC and differential    Comprehensive metabolic panel    Hemoglobin A1C    Lipid panel       Other    Adult BMI 40 0-44 9 kg/sq m ([facility] [address] ) - Primary     Continue with intermittent fasting and 16h of fast   Continue to make healthy food choices and consider incorporating keto diet  Relevant Medications    phentermine 37 5 MG capsule    Other Relevant Orders    CBC and differential    Comprehensive metabolic panel    Hemoglobin A1C    Lipid panel    Prediabetes     Consider limiting carbohydrates and introducing keto diet  Relevant Orders    Hemoglobin A1C    Weight gain     Continues to stay active and continue with intermittent fasting  Sitter increasing your calorie intake to 1200 víctor daily in order to avoid starvation phase and decreasing metabolism  Relevant Medications    phentermine 37 5 MG capsule   Other Visit Diagnoses     Fatigue, unspecified type        Relevant Orders    CBC and differential    Comprehensive metabolic panel    Hemoglobin A1C    Lipid panel    TSH, 3rd generation with Free T4 reflex    Vitamin D 25 hydroxy    Iron Panel (Includes Ferritin, Iron Sat%, Iron, and TIBC)            Subjective:      Patient ID: Jessica Ar is a 40 y o  female  Patient presents to the office for medication follow-up  She has been taking phentermine daily and doing well on it  Today her blood pressure is slightly elevated  Patient does not check her blood pressure at home  Is denying any chest pain, palpitations, shortness of breath headaches or dizziness        The following portions of the patient's history were reviewed and updated as appropriate:   Past Medical History:  She has a past medical history of Asthma, Diabetes mellitus ([facility] ), Elective , H/O gestational diabetes mellitus, not currently pregnant (2019), Hypertension, Menorrhagia with regular cycle (2018), Migraine, Morbid obesity with BMI of 45 0-49 9, adult ([facility] [address] ), Prediabetes (2021), and Varicella  ,  _______________________________________________________________________  Medical Problems:  does not have any pertinent problems on file ,  _______________________________________________________________________  Past Surgical History:   has a past surgical history that includes Induced   ,  _______________________________________________________________________  Family History:  family history includes Asthma in her brother, brother, brother, sister, sister, and sister; Breast cancer in her mother; Cancer in her mother; Diabetes in her father and paternal grandmother; Heart disease in her mother; Hypertension in her mother; Other in her mother; Stroke in her mother ,  _______________________________________________________________________  Social History:   reports that she has never smoked  She has never used smokeless tobacco  She reports that she does not drink alcohol and does not use drugs  ,  _______________________________________________________________________  Allergies:  has No Known Allergies     _______________________________________________________________________  Current Outpatient Medications   Medication Sig Dispense Refill   • amLODIPine (NORVASC) 10 mg tablet Take 1 tablet (10 mg total) by mouth daily 90 tablet 1   • levonorgestrel (MIRENA) 20 MCG/24HR IUD 1 each by Intrauterine route once     • lisinopril (ZESTRIL) 10 mg tablet Take 1 tablet (10 mg total) by mouth daily 90 tablet 1   • metFORMIN (GLUCOPHAGE-XR) 750 mg 24 hr tablet Take 1 tablet (750 mg total) by mouth daily with dinner 90 tablet 1   • phentermine 37 5 MG capsule Take 1 capsule (37 5 mg total) by mouth every morning 30 capsule 0   • topiramate (TOPAMAX) 50 MG tablet Take 1 tablet at night 90 tablet 1     No current facility-administered medications for this visit      _______________________________________________________________________  Review of Systems   Constitutional: Negative for chills and fever  Respiratory: Negative for cough and shortness of breath  Cardiovascular: Negative for chest pain  Gastrointestinal: Negative for abdominal pain and vomiting  Genitourinary: Negative for dysuria  Musculoskeletal: Negative for arthralgias and back pain  Neurological: Negative for headaches  Psychiatric/Behavioral: Negative for sleep disturbance  All other systems reviewed and are negative  Objective:  Vitals:    02/03/23 1031 02/03/23 1037 02/03/23 1050   BP: 150/98 140/100 140/80   BP Location: Right arm     Pulse: 90     Resp: 16     Temp: (!) 97 2 °F (36 2 °C)     Weight: 115 kg (252 lb 9 6 oz)     Height: 5' 6" (1 676 m)       Body mass index is 40 77 kg/m²  Physical Exam  Vitals and nursing note reviewed  Constitutional:       General: She is not in acute distress  Appearance: Normal appearance  She is obese  She is not ill-appearing  Cardiovascular:      Rate and Rhythm: Normal rate and regular rhythm  Heart sounds: Normal heart sounds  Pulmonary:      Effort: Pulmonary effort is normal       Breath sounds: Normal breath sounds  Musculoskeletal:         General: Normal range of motion  Skin:     General: Skin is warm and dry  Neurological:      Mental Status: She is alert and oriented to person, place, and time  Psychiatric:         Mood and Affect: Mood normal          Behavior: Behavior normal          Thought Content:  Thought content normal          Judgment: Judgment normal

## 2023-04-12 ENCOUNTER — APPOINTMENT (OUTPATIENT)
Dept: LAB | Facility: HOSPITAL | Age: 38
End: 2023-04-12
Payer: COMMERCIAL

## 2023-04-12 DIAGNOSIS — I10 ESSENTIAL HYPERTENSION: ICD-10-CM

## 2023-04-12 DIAGNOSIS — R73.03 PREDIABETES: ICD-10-CM

## 2023-04-12 DIAGNOSIS — R53.83 FATIGUE, UNSPECIFIED TYPE: ICD-10-CM

## 2023-04-12 LAB
25(OH)D3 SERPL-MCNC: 10.4 NG/ML (ref 30–100)
ALBUMIN SERPL BCP-MCNC: 4.2 G/DL (ref 3.5–5)
ALP SERPL-CCNC: 59 U/L (ref 34–104)
ALT SERPL W P-5'-P-CCNC: 26 U/L (ref 7–52)
ANION GAP SERPL CALCULATED.3IONS-SCNC: 8 MMOL/L (ref 4–13)
AST SERPL W P-5'-P-CCNC: 18 U/L (ref 13–39)
BASOPHILS # BLD AUTO: 0.03 THOUSANDS/ÂΜL (ref 0–0.1)
BASOPHILS NFR BLD AUTO: 1 % (ref 0–1)
BILIRUB SERPL-MCNC: 0.85 MG/DL (ref 0.2–1)
BUN SERPL-MCNC: 12 MG/DL (ref 5–25)
CALCIUM SERPL-MCNC: 9.6 MG/DL (ref 8.4–10.2)
CHLORIDE SERPL-SCNC: 103 MMOL/L (ref 96–108)
CHOLEST SERPL-MCNC: 187 MG/DL
CO2 SERPL-SCNC: 29 MMOL/L (ref 21–32)
CREAT SERPL-MCNC: 1.03 MG/DL (ref 0.6–1.3)
EOSINOPHIL # BLD AUTO: 0.03 THOUSAND/ÂΜL (ref 0–0.61)
EOSINOPHIL NFR BLD AUTO: 1 % (ref 0–6)
ERYTHROCYTE [DISTWIDTH] IN BLOOD BY AUTOMATED COUNT: 12.1 % (ref 11.6–15.1)
EST. AVERAGE GLUCOSE BLD GHB EST-MCNC: 120 MG/DL
FERRITIN SERPL-MCNC: 160 NG/ML (ref 8–388)
GFR SERPL CREATININE-BSD FRML MDRD: 69 ML/MIN/1.73SQ M
GLUCOSE P FAST SERPL-MCNC: 93 MG/DL (ref 65–99)
HBA1C MFR BLD: 5.8 %
HCT VFR BLD AUTO: 46.5 % (ref 34.8–46.1)
HDLC SERPL-MCNC: 45 MG/DL
HGB BLD-MCNC: 14.3 G/DL (ref 11.5–15.4)
IMM GRANULOCYTES # BLD AUTO: 0.01 THOUSAND/UL (ref 0–0.2)
IMM GRANULOCYTES NFR BLD AUTO: 0 % (ref 0–2)
IRON SATN MFR SERPL: 28 % (ref 15–50)
IRON SERPL-MCNC: 75 UG/DL (ref 50–170)
LDLC SERPL CALC-MCNC: 129 MG/DL (ref 0–100)
LYMPHOCYTES # BLD AUTO: 1.66 THOUSANDS/ÂΜL (ref 0.6–4.47)
LYMPHOCYTES NFR BLD AUTO: 40 % (ref 14–44)
MCH RBC QN AUTO: 27.1 PG (ref 26.8–34.3)
MCHC RBC AUTO-ENTMCNC: 30.8 G/DL (ref 31.4–37.4)
MCV RBC AUTO: 88 FL (ref 82–98)
MONOCYTES # BLD AUTO: 0.42 THOUSAND/ÂΜL (ref 0.17–1.22)
MONOCYTES NFR BLD AUTO: 10 % (ref 4–12)
NEUTROPHILS # BLD AUTO: 2.02 THOUSANDS/ÂΜL (ref 1.85–7.62)
NEUTS SEG NFR BLD AUTO: 48 % (ref 43–75)
NONHDLC SERPL-MCNC: 142 MG/DL
NRBC BLD AUTO-RTO: 0 /100 WBCS
PLATELET # BLD AUTO: 196 THOUSANDS/UL (ref 149–390)
PMV BLD AUTO: 12.7 FL (ref 8.9–12.7)
POTASSIUM SERPL-SCNC: 3.6 MMOL/L (ref 3.5–5.3)
PROT SERPL-MCNC: 7.9 G/DL (ref 6.4–8.4)
RBC # BLD AUTO: 5.28 MILLION/UL (ref 3.81–5.12)
SODIUM SERPL-SCNC: 140 MMOL/L (ref 135–147)
TIBC SERPL-MCNC: 269 UG/DL (ref 250–450)
TRIGL SERPL-MCNC: 67 MG/DL
TSH SERPL DL<=0.05 MIU/L-ACNC: 0.58 UIU/ML (ref 0.45–4.5)
WBC # BLD AUTO: 4.17 THOUSAND/UL (ref 4.31–10.16)

## 2023-04-12 PROCEDURE — 85025 COMPLETE CBC W/AUTO DIFF WBC: CPT

## 2023-04-12 PROCEDURE — 83540 ASSAY OF IRON: CPT

## 2023-04-12 PROCEDURE — 83036 HEMOGLOBIN GLYCOSYLATED A1C: CPT

## 2023-04-12 PROCEDURE — 82728 ASSAY OF FERRITIN: CPT

## 2023-04-12 PROCEDURE — 84443 ASSAY THYROID STIM HORMONE: CPT

## 2023-04-12 PROCEDURE — 82306 VITAMIN D 25 HYDROXY: CPT

## 2023-04-12 PROCEDURE — 83550 IRON BINDING TEST: CPT

## 2023-04-12 PROCEDURE — 36415 COLL VENOUS BLD VENIPUNCTURE: CPT

## 2023-04-12 PROCEDURE — 80053 COMPREHEN METABOLIC PANEL: CPT

## 2023-04-12 PROCEDURE — 80061 LIPID PANEL: CPT

## 2023-04-20 PROBLEM — L70.8 OTHER ACNE: Status: ACTIVE | Noted: 2023-04-20

## 2023-04-20 PROBLEM — F43.9 STRESS: Status: ACTIVE | Noted: 2023-04-20

## 2023-06-14 ENCOUNTER — ANNUAL EXAM (OUTPATIENT)
Dept: OBGYN CLINIC | Facility: CLINIC | Age: 38
End: 2023-06-14
Payer: COMMERCIAL

## 2023-06-14 VITALS
DIASTOLIC BLOOD PRESSURE: 82 MMHG | BODY MASS INDEX: 41.3 KG/M2 | SYSTOLIC BLOOD PRESSURE: 132 MMHG | WEIGHT: 257 LBS | HEIGHT: 66 IN

## 2023-06-14 DIAGNOSIS — Z11.3 SCREENING EXAMINATION FOR STD (SEXUALLY TRANSMITTED DISEASE): ICD-10-CM

## 2023-06-14 DIAGNOSIS — Z12.31 SCREENING MAMMOGRAM FOR BREAST CANCER: ICD-10-CM

## 2023-06-14 DIAGNOSIS — Z80.3 FAMILY HISTORY OF BREAST CANCER IN FIRST DEGREE RELATIVE: ICD-10-CM

## 2023-06-14 DIAGNOSIS — Z01.411 ENCOUNTER FOR GYNECOLOGICAL EXAMINATION WITH ABNORMAL FINDING: Primary | ICD-10-CM

## 2023-06-14 DIAGNOSIS — Z30.431 SURVEILLANCE OF INTRAUTERINE CONTRACEPTION: ICD-10-CM

## 2023-06-14 PROBLEM — R63.5 WEIGHT GAIN: Status: RESOLVED | Noted: 2021-09-13 | Resolved: 2023-06-14

## 2023-06-14 PROBLEM — F43.9 STRESS: Status: RESOLVED | Noted: 2023-04-20 | Resolved: 2023-06-14

## 2023-06-14 PROBLEM — H00.011 HORDEOLUM EXTERNUM OF RIGHT UPPER EYELID: Status: RESOLVED | Noted: 2021-03-23 | Resolved: 2023-06-14

## 2023-06-14 PROCEDURE — 87591 N.GONORRHOEAE DNA AMP PROB: CPT | Performed by: NURSE PRACTITIONER

## 2023-06-14 PROCEDURE — 99395 PREV VISIT EST AGE 18-39: CPT | Performed by: NURSE PRACTITIONER

## 2023-06-14 PROCEDURE — 87491 CHLMYD TRACH DNA AMP PROBE: CPT | Performed by: NURSE PRACTITIONER

## 2023-06-14 NOTE — PROGRESS NOTES
Diagnoses and all orders for this visit:    Encounter for gynecological examination with abnormal finding    Surveillance of intrauterine contraception    Family history of breast cancer in first degree relative    Screening examination for STD (sexually transmitted disease)  -     Chlamydia/GC amplified DNA by PCR    Screening mammogram for breast cancer  -     Mammo screening bilateral w 3d & cad; Future    Calcium/vit d inclusion in the diet discussed, call with any issues, SBE reinforced, all concerns addressed  Pleasant 40 y o  premenopausal female here for annual exam  She denies any issues with bleeding or her menses  Reports monthly cycles that last 6 days with her Mirena placed 2019  Denies history of abnormal pap smears  Last Pap and HPV tests were done on 2021 neg/neg, a pap was NOT done today  She denies vaginal issues  Hx of GDM  Denies pelvic pain  Denies dyspareunia  Denies any issues with her BCM  Sexually active without any concerns  Monogamous x years but she did have gonorrhea in   She requests STD testing today  Fam Hx breast cancer with her mom at age 37, passed away at 62  Baseline mammogram ordered again        Past Medical History:   Diagnosis Date   • Asthma     childhood   • Diabetes mellitus ([facility] )     gestational   • Elective      Resolved 2016    • H/O gestational diabetes mellitus, not currently pregnant 2019   • Hypertension    • Menorrhagia with regular cycle 2018   • Migraine    • Morbid obesity with BMI of 45 0-49 9, adult ([facility] )    • Prediabetes 2021   • Varicella     childhood     Past Surgical History:   Procedure Laterality Date   • INDUCED        Family History   Problem Relation Age of Onset   • Cancer Mother    • Heart disease Mother    • Stroke Mother    • Other Mother    • Hypertension Mother    • Breast cancer Mother    • Diabetes Father    • Asthma Sister    • Asthma Brother    • Diabetes Paternal Grandmother • Asthma Brother    • Asthma Brother    • Asthma Sister    • Asthma Sister      Social History     Tobacco Use   • Smoking status: Never   • Smokeless tobacco: Never   Vaping Use   • Vaping Use: Never used   Substance Use Topics   • Alcohol use: No   • Drug use: No       Current Outpatient Medications:   •  amLODIPine (NORVASC) 10 mg tablet, Take 1 tablet (10 mg total) by mouth daily, Disp: 90 tablet, Rfl: 1  •  levonorgestrel (MIRENA) 20 MCG/24HR IUD, 1 each by Intrauterine route once, Disp: , Rfl:   •  lisinopril (ZESTRIL) 10 mg tablet, TAKE 1 TABLET BY MOUTH EVERY DAY, Disp: 90 tablet, Rfl: 1  •  metFORMIN (GLUCOPHAGE-XR) 750 mg 24 hr tablet, Take 1 tablet (750 mg total) by mouth daily with dinner, Disp: 90 tablet, Rfl: 1  •  phentermine 30 MG capsule, Take 1 capsule (30 mg total) by mouth every morning, Disp: 30 capsule, Rfl: 1  •  topiramate (TOPAMAX) 50 MG tablet, Take 1 tablet at night, Disp: 90 tablet, Rfl: 1  Patient Active Problem List    Diagnosis Date Noted   • Surveillance of intrauterine contraception 2023   • Other acne 2023   • Plantar fasciitis 2022   • Prediabetes 2021   • Morbid obesity ([facility] ) 2021   • Gonorrhea 2021   • Essential hypertension 2019   • Adult BMI 40 0-44 9 kg/sq m ([facility] ) 2019   • H/O gestational diabetes mellitus, not currently pregnant 2019   • Migraine 05/10/2019       No Known Allergies    OB History    Para Term  AB Living   5 2 2   3 2   SAB IAB Ectopic Multiple Live Births         0 2      # Outcome Date GA Lbr Jay Jay/2nd Weight Sex Delivery Anes PTL Lv   5 Term 10/21/16 38w6d 07:10 / 00:08 3500 g (7 lb 11 5 oz) F Vag-Spont EPI N JENNY   4 Term 01/13/15 38w4d  3459 g (7 lb 10 oz) M Vag-Spont EPI  JENNY   3 AB            2 AB            1 AB              Son and daughter 6 and 6  Works night shift [employer]    Vitals:    23 1124   BP: 132/82   BP Location: Left arm   Patient Position: Sitting "  Cuff Size: Standard   Weight: 117 kg (257 lb)   Height: 5' 6\" (1 676 m)     Body mass index is 41 48 kg/m²  Review of Systems   Constitutional: Negative for chills, fatigue, fever and unexpected weight change  Respiratory: Negative for shortness of breath  Gastrointestinal: Negative for anal bleeding, blood in stool, constipation and diarrhea  Genitourinary: Negative for difficulty urinating, dysuria and hematuria  Physical Exam   Constitutional: She appears well-developed and well-nourished  No distress  HENT: atraumatic  Head: Normocephalic  Neck: Normal range of motion  Neck supple  Pulmonary: Effort normal   Breasts: bilateral without masses, skin changes or nipple discharge  Bilaterally soft and warm to touch  No areas of erythema or pain  Abdominal: Soft  Pelvic exam was performed with patient supine  No labial fusion  There is no rash, tenderness, lesion or injury on the right labia  There is no rash, tenderness, lesion or injury on the left labia  Urethral meatus does not show any tenderness, inflammation or discharge  Palpation of midline bladder without pain or discomfort  Uterus is not deviated, not enlarged, not fixed and not tender  Cervix exhibits no motion tenderness, no discharge and no friability  Right adnexum displays no mass, no tenderness and no fullness  Left adnexum displays no mass, no tenderness and no fullness  No erythema or tenderness in the vagina  No foreign body in the vagina  No signs of injury around the vagina  No vaginal discharge found  No signs of injury around the vagina or anus  Perineum without lesions, signs of injury, erythema or swelling  IUD strings seen from OS  Lymphadenopathy:        Right: No inguinal adenopathy present  Left: No inguinal adenopathy present           "

## 2023-06-14 NOTE — PATIENT INSTRUCTIONS
Breast Self Exam for Women   AMBULATORY CARE:   A breast self-exam (BSE)  is a way to check your breasts for lumps and other changes  Regular BSEs can help you know how your breasts normally look and feel  Most breast lumps or changes are not cancer, but you should always have them checked by a healthcare provider  Why you should do a BSE:  Breast cancer is the most common type of cancer in women  Even if you have mammograms, you may still want to do a BSE regularly  If you know how your breasts normally feel and look, it may help you know when to contact your healthcare provider  Mammograms can miss some cancers  You may find a lump during a BSE that did not show up on a mammogram   When you should do a BSE:  If you have periods, you may want to do your BSE 1 week after your period ends  This is the time when your breasts may be the least swollen, lumpy, or tender  You can do regular BSEs even if you are breastfeeding or have breast implants  Call your doctor if:   You find any lumps or changes in your breasts  You have breast pain or fluid coming from your nipples  You have questions or concerns about your condition or care  How to do a BSE:       Look at your breasts in a mirror  Look at the size and shape of each breast and nipple  Check for swelling, lumps, dimpling, scaly skin, or other skin changes  Look for nipple changes, such as a nipple that is painful or beginning to pull inward  Gently squeeze both nipples and check to see if fluid (that is not breast milk) comes out of them  If you find any of these or other breast changes, contact your healthcare provider  Check your breasts while you sit or  the following 3 positions:    [place] your arms down at your sides  Raise your hands and join them behind your head  Put firm pressure with your hands on your hips  Bend slightly forward while you look at your breasts in the mirror  Lie down and feel your breasts    When you lie down, your breast tissue spreads out evenly over your chest  This makes it easier for you to feel for lumps and anything that may not be normal for your breasts  Do a BSE on one breast at a time  Place a small pillow or towel under your left shoulder  Put your left arm behind your head  Use the 3 middle fingers of your right hand  Use your fingertip pads, on the top of your fingers  Your fingertip pad is the most sensitive part of your finger  Use small circles to feel your breast tissue  Use your fingertip pads to make dime-sized, overlapping circles on your breast and armpits  Use light, medium, and firm pressure  First, press lightly  Second, press with medium pressure to feel a little deeper into the breast  Last, use firm pressure to feel deep within your breast     Examine your entire breast area  Examine the breast area from above the breast to below the breast where you feel only ribs  Make small circles with your fingertips, starting in the middle of your armpit  Make circles going up and down the breast area  Continue toward your breast and all the way across it  Examine the area from your armpit all the way over to the middle of your chest (breastbone)  Stop at the middle of your chest     Move the pillow or towel to your right shoulder, and put your right arm behind your head  Use the 3 fingertip pads of your left hand, and repeat the above steps to do a BSE on your right breast   What else you can do to check for breast problems or cancer:  Talk to your healthcare provider about mammograms  A mammogram is an x-ray of your breasts to screen for breast cancer or other problems  Your provider can tell you the benefits and risks of mammograms  The first mammogram is usually at age 39 or 48  Your provider may recommend you start at 36 or younger if your risk for breast cancer is high  Mammograms usually continue every 1 to 2 years until age 76         Follow up with your doctor as directed:  Write down your questions so you remember to ask them during your visits  © Copyright [employer] Vel[employer] 2022 Information is for End User's use only and may not be sold, redistributed or otherwise used for commercial purposes  The above information is an  only  It is not intended as medical advice for individual conditions or treatments  Talk to your doctor, nurse or pharmacist before following any medical regimen to see if it is safe and effective for you

## 2023-06-16 LAB
C TRACH DNA SPEC QL NAA+PROBE: NEGATIVE
N GONORRHOEA DNA SPEC QL NAA+PROBE: NEGATIVE

## 2023-06-20 ENCOUNTER — OFFICE VISIT (OUTPATIENT)
Dept: FAMILY MEDICINE CLINIC | Facility: CLINIC | Age: 38
End: 2023-06-20
Payer: COMMERCIAL

## 2023-06-20 VITALS
BODY MASS INDEX: 41.46 KG/M2 | OXYGEN SATURATION: 98 % | RESPIRATION RATE: 16 BRPM | HEIGHT: 66 IN | DIASTOLIC BLOOD PRESSURE: 96 MMHG | HEART RATE: 100 BPM | WEIGHT: 258 LBS | TEMPERATURE: 98.6 F | SYSTOLIC BLOOD PRESSURE: 120 MMHG

## 2023-06-20 DIAGNOSIS — E66.01 MORBID OBESITY (HCC): Primary | ICD-10-CM

## 2023-06-20 DIAGNOSIS — R63.5 WEIGHT GAIN: ICD-10-CM

## 2023-06-20 DIAGNOSIS — I10 ESSENTIAL HYPERTENSION: ICD-10-CM

## 2023-06-20 PROCEDURE — 99214 OFFICE O/P EST MOD 30 MIN: CPT

## 2023-06-20 RX ORDER — AMLODIPINE BESYLATE 10 MG/1
10 TABLET ORAL DAILY
Qty: 90 TABLET | Refills: 1 | Status: SHIPPED | OUTPATIENT
Start: 2023-06-20

## 2023-06-20 RX ORDER — LISINOPRIL 10 MG/1
10 TABLET ORAL DAILY
Qty: 90 TABLET | Refills: 1 | Status: SHIPPED | OUTPATIENT
Start: 2023-06-20

## 2023-06-20 RX ORDER — PHENTERMINE HYDROCHLORIDE 30 MG/1
30 CAPSULE ORAL EVERY MORNING
Qty: 30 CAPSULE | Refills: 1 | Status: SHIPPED | OUTPATIENT
Start: 2023-06-20

## 2023-06-20 NOTE — ASSESSMENT & PLAN NOTE
Blood pressure borderline, elevated diastolic  Refills sent to pharmacy  Discussed medication desired effects, potential side effects, and how to administer the medication  Non-pharmacological interventions suchas low salt, cardiac and Mediterranean diet discussed  Educated on stress reduction and physical activity  Encouraged to check blood pressure daily write down the numbers and bring them  to the next appointment  Discussed signs and symptoms of major cardiovascular event and need to present to the ED  Follow up in 2 months or sooner if needed  Patient verbalizes understanding regarding plan of care and all questions answered

## 2023-06-20 NOTE — ASSESSMENT & PLAN NOTE
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

## 2023-06-20 NOTE — PROGRESS NOTES
Depression Screening and Follow-up Plan: Patient was screened for depression during today's encounter  They screened negative with a PHQ-2 score of 0  Assessment/Plan:         Problem List Items Addressed This Visit        Cardiovascular and Mediastinum    Essential hypertension     Blood pressure borderline, elevated diastolic  Refills sent to pharmacy  Discussed medication desired effects, potential side effects, and how to administer the medication  Non-pharmacological interventions suchas low salt, cardiac and Mediterranean diet discussed  Educated on stress reduction and physical activity  Encouraged to check blood pressure daily write down the numbers and bring them  to the next appointment  Discussed signs and symptoms of major cardiovascular event and need to present to the ED  Follow up in 2 months or sooner if needed  Patient verbalizes understanding regarding plan of care and all questions answered  Relevant Medications    amLODIPine (NORVASC) 10 mg tablet    lisinopril (ZESTRIL) 10 mg tablet       Other    Adult BMI 40 0-44 9 kg/sq m (HCC)     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  Continue with intermittent fasting and try to stay active by walking  Relevant Medications    phentermine 30 MG capsule    Morbid obesity (HCC) - Primary     Weight is up from last visit  Will restart 30 mg phentermine, refill sent to pharmacy  Discussed medication desired effects, potential side effects, and how to administer the medication  Non-pharmacological interventions such as intermittent fasting, low carb diet, high in vegetables and fruit discussed  Educated on importance of physical activity  Congratulated patient on continued results  Follow up in 2 months or sooner if needed  Patient verbalizes understanding regarding plan of care and all questions answered            Other Visit Diagnoses     Weight gain        Relevant Medications    phentermine 30 MG capsule            Subjective:      Patient ID: Rito Menendez is a 40 y o  female  Patient presents to the office for weight loss follow-up  Admits to not taking phentermine for the past month due to insurance issues  She did start a new job and works nights now  Blood pressure borderline today, most likely due to coming off night shift  Is denying any chest pain, palpitations, shortness of breath  The following portions of the patient's history were reviewed and updated as appropriate:   Past Medical History:  She has a past medical history of Asthma, Diabetes mellitus ([facility] ), Elective , H/O gestational diabetes mellitus, not currently pregnant (2019), Hypertension, Menorrhagia with regular cycle (2018), Migraine, Morbid obesity with BMI of 45 0-49 9, adult ([facility] ), Prediabetes (2021), and Varicella  ,  _______________________________________________________________________  Medical Problems:  does not have any pertinent problems on file ,  _______________________________________________________________________  Past Surgical History:   has a past surgical history that includes Induced   ,  _______________________________________________________________________  Family History:  family history includes Asthma in her brother, brother, brother, sister, sister, and sister; Breast cancer in her mother; Cancer in her mother; Diabetes in her father and paternal grandmother; Heart disease in her mother; Hypertension in her mother; Other in her mother; Stroke in her mother ,  _______________________________________________________________________  Social History:   reports that she has never smoked  She has never used smokeless tobacco  She reports that she does not drink alcohol and does not use drugs  ,  _______________________________________________________________________  Allergies:  has No Known "Allergies     _______________________________________________________________________  Current Outpatient Medications   Medication Sig Dispense Refill   • amLODIPine (NORVASC) 10 mg tablet Take 1 tablet (10 mg total) by mouth daily 90 tablet 1   • levonorgestrel (MIRENA) 20 MCG/24HR IUD 1 each by Intrauterine route once     • lisinopril (ZESTRIL) 10 mg tablet Take 1 tablet (10 mg total) by mouth daily 90 tablet 1   • metFORMIN (GLUCOPHAGE-XR) 750 mg 24 hr tablet Take 1 tablet (750 mg total) by mouth daily with dinner 90 tablet 1   • phentermine 30 MG capsule Take 1 capsule (30 mg total) by mouth every morning 30 capsule 1   • topiramate (TOPAMAX) 50 MG tablet Take 1 tablet at night 90 tablet 1     No current facility-administered medications for this visit      _______________________________________________________________________  Review of Systems   Constitutional: Negative for chills and fever  Respiratory: Negative for cough and shortness of breath  Cardiovascular: Negative for chest pain  Gastrointestinal: Negative for abdominal pain and vomiting  Genitourinary: Negative for dysuria  Musculoskeletal: Negative for arthralgias and back pain  Neurological: Negative for headaches  Psychiatric/Behavioral: The patient is nervous/anxious (stress)  All other systems reviewed and are negative  Objective:  Vitals:    06/20/23 0815   BP: 120/96   Pulse: 100   Resp: 16   Temp: 98 6 °F (37 °C)   SpO2: 98%   Weight: 117 kg (258 lb)   Height: 5' 6\" (1 676 m)     Body mass index is 41 64 kg/m²  Physical Exam  Vitals and nursing note reviewed  Constitutional:       General: She is not in acute distress  Appearance: Normal appearance  She is obese  She is not ill-appearing  Cardiovascular:      Rate and Rhythm: Normal rate and regular rhythm  Heart sounds: Normal heart sounds  Pulmonary:      Effort: Pulmonary effort is normal       Breath sounds: Normal breath sounds   " "  Musculoskeletal:         General: Normal range of motion  Skin:     General: Skin is warm and dry  Neurological:      Mental Status: She is alert and oriented to person, place, and time  Psychiatric:         Mood and Affect: Mood normal          Behavior: Behavior normal          Thought Content: Thought content normal          Judgment: Judgment normal                Portions of the above record have been created with voice recognition software  Occasional wrong word or \"sound alike\" substitution may have occurred due to the inherent limitations of voice recognition software  Read the chart carefully and recognize, using context, where substitution may have occurred    "

## 2023-06-20 NOTE — ASSESSMENT & PLAN NOTE
Weight is up from last visit  Will restart 30 mg phentermine, refill sent to pharmacy  Discussed medication desired effects, potential side effects, and how to administer the medication  Non-pharmacological interventions such as intermittent fasting, low carb diet, high in vegetables and fruit discussed  Educated on importance of physical activity  Congratulated patient on continued results  Follow up in 2 months or sooner if needed  Patient verbalizes understanding regarding plan of care and all questions answered

## 2023-07-05 NOTE — PROGRESS NOTES
Caller: Wilton Lomax    Relationship to patient: Self    Best call back number: [phone]    Chief complaint: NEEDS F/U SO SHE CAN GET A REFILL ON HER MED    Type of visit: F/U 1     Requested date: CALL TO [facility] ASAP, SHE WILL SEE A APRN     If rescheduling, when is the original appointment: N/A   Diagnoses and all orders for this visit:    Birth control counseling    OCP (oral contraceptive pills) initiation  -     norethindrone-ethinyl estradiol (JUNEL ) 1-20 MG-MCG per tablet; Take 1 tablet by mouth daily    Return for Mirena insertion  Call in 2 wks if no word from us, all questions answered, return for IUD insertion  Pleasant 35 y o  here for complaints  She denies any treatments tried  Denies recent antibiotic use  Denies douching  Denies fever, pelvic pain or dysparunia  Denies new sexual partners  Will start ocp until Mirena inserted  S/P TOP in [place] on , approx 19-22 wks gestation  No sequelae from this      Past Medical History:   Diagnosis Date    Asthma     childhood    Diabetes mellitus ([facility] )     gestational    Hypertension     Migraine     Varicella     childhood     Past Surgical History:   Procedure Laterality Date    INDUCED        Social History     Tobacco Use    Smoking status: Never Smoker    Smokeless tobacco: Never Used   Substance Use Topics    Alcohol use: No    Drug use: No     Family History   Problem Relation Age of Onset    Cancer Mother     Heart disease Mother    [facility] Stroke Mother     Diabetes Father     Asthma Sister     Asthma Brother     Diabetes Paternal [de-identified]     Asthma Brother     Asthma Brother     Asthma Sister     Asthma Sister        Current Outpatient Medications:     norethindrone-ethinyl estradiol (Pamela Sandro ) 1-20 MG-MCG per tablet, Take 1 tablet by mouth daily, Disp: 28 tablet, Rfl: 2    No Known Allergies  OB History    Para Term  AB Living   5 2 2   3 2   SAB TAB Ectopic Multiple Live Births         0 2      # Outcome Date GA Lbr Jay Jay/2nd Weight Sex Delivery Anes PTL Lv   5 Term 10/21/16 38w6d 07:10 / 00:08 3500 g (7 lb 11 5 oz) F Vag-Spont EPI N JENNY   4 Term 01/13/15 38w4d  3459 g (7 lb 10 oz) M Vag-Spont EPI  JENNY   3 AB            2 AB            1 AB                Vitals:    19 1311 BP: 134/74   BP Location: Right arm   Patient Position: Sitting   Weight: 116 kg (255 lb)   Height: 5' 5" (1 651 m)     Body mass index is 42 43 kg/m²  Review of Systems   Constitutional: Negative for chills, fatigue, fever and unexpected weight change  Respiratory: Negative for shortness of breath  Gastrointestinal: Negative for anal bleeding, blood in stool, constipation and diarrhea  Genitourinary: Negative for difficulty urinating, dysuria and hematuria  Physical Exam   Constitutional: She appears well-developed and well-nourished  No distress  HENT: atraumatic, EOM intact  Head: Normocephalic  Neck: Normal range of motion  Neck supple  Abdominal: Soft  Obese  Extremeties: full ROM upper and lower, no edema    Pelvic deferred d/t menses day 3

## 2023-09-28 ENCOUNTER — OFFICE VISIT (OUTPATIENT)
Dept: FAMILY MEDICINE CLINIC | Facility: CLINIC | Age: 38
End: 2023-09-28
Payer: COMMERCIAL

## 2023-09-28 VITALS
HEIGHT: 66 IN | DIASTOLIC BLOOD PRESSURE: 88 MMHG | BODY MASS INDEX: 41.95 KG/M2 | WEIGHT: 261 LBS | SYSTOLIC BLOOD PRESSURE: 138 MMHG | HEART RATE: 104 BPM | RESPIRATION RATE: 16 BRPM | TEMPERATURE: 97.8 F | OXYGEN SATURATION: 99 %

## 2023-09-28 DIAGNOSIS — G43.809 OTHER MIGRAINE WITHOUT STATUS MIGRAINOSUS, NOT INTRACTABLE: ICD-10-CM

## 2023-09-28 DIAGNOSIS — I10 ESSENTIAL HYPERTENSION: ICD-10-CM

## 2023-09-28 DIAGNOSIS — Z86.32 HISTORY OF GESTATIONAL DIABETES: ICD-10-CM

## 2023-09-28 DIAGNOSIS — R73.03 PREDIABETES: ICD-10-CM

## 2023-09-28 DIAGNOSIS — R63.5 WEIGHT GAIN: ICD-10-CM

## 2023-09-28 DIAGNOSIS — E66.01 MORBID OBESITY (HCC): Primary | ICD-10-CM

## 2023-09-28 PROCEDURE — 99214 OFFICE O/P EST MOD 30 MIN: CPT

## 2023-09-28 RX ORDER — TOPIRAMATE 50 MG/1
TABLET, FILM COATED ORAL
Qty: 90 TABLET | Refills: 1 | Status: SHIPPED | OUTPATIENT
Start: 2023-09-28

## 2023-09-28 RX ORDER — METFORMIN HYDROCHLORIDE 750 MG/1
750 TABLET, EXTENDED RELEASE ORAL
Qty: 90 TABLET | Refills: 1 | Status: SHIPPED | OUTPATIENT
Start: 2023-09-28

## 2023-09-28 NOTE — PROGRESS NOTES
Assessment/Plan:       Problem List Items Addressed This Visit        Cardiovascular and Mediastinum    Migraine    Relevant Medications    topiramate (TOPAMAX) 50 MG tablet    Essential hypertension     Slightly elevated today. She did not take her blood pressure medicine just yet. Need to take your blood pressure medicine every day continue to check your blood pressure as discussed during the appointment. Continue to monitor. Other    Adult BMI 40.0-44.9 kg/sq m [facility])     Has been making healthy food choices and stay active. Does practice intermittent fasting. She has been taking phentermine 30 mg for few months now, but weight is not coming down. She is also taking metformin 750 mg daily with dinner. At this point she would be a good candidate for GLP-1 such as Saxenda. Possible side effects discussed. Patient verbalizes understanding. Relevant Medications    liraglutide (SAXENDA) injection    Prediabetes     Continue to make healthy food choices and stay active. Continue to practice intermittent fasting. Continue on metformin 750 mg daily with dinner. Relevant Medications    liraglutide (SAXENDA) injection    metFORMIN (GLUCOPHAGE-XR) 750 mg 24 hr tablet    Morbid obesity ([address]) - Primary     Patient failed phentermine. We will initiate Saxenda daily injections. Possible side effects discussed. Patient verbalizes understanding. Follow-up in 4 weeks. Relevant Medications    liraglutide (SAXENDA) injection    topiramate (TOPAMAX) 50 MG tablet   Other Visit Diagnoses     Weight gain        Relevant Medications    liraglutide (SAXENDA) injection    History of gestational diabetes        Relevant Medications    liraglutide (SAXENDA) injection            Subjective:      Patient ID: Mike Garcia is a 40 y.o. female. Patient presents to the office for a routine follow-up. Has been taking all of the medications as prescribed and denies adverse effects. Recent/relevant studies and blood work reviewed. Denies change in vision, headache, or dizziness. No complaints of chest pain, palpitations, or shortness of breath. The following portions of the patient's history were reviewed and updated as appropriate:   Past Medical History:  She has a past medical history of Asthma, Diabetes mellitus ([address]), Elective , H/O gestational diabetes mellitus, not currently pregnant (2019), Hypertension, Menorrhagia with regular cycle (2018), Migraine, Morbid obesity with BMI of 45.0-49.9, adult ([address]), Prediabetes (2021), and Varicella. ,  _______________________________________________________________________  Medical Problems:  does not have any pertinent problems on file.,  _______________________________________________________________________  Past Surgical History:   has a past surgical history that includes Induced . ,  _______________________________________________________________________  Family History:  family history includes Asthma in her brother, brother, brother, sister, sister, and sister; Breast cancer in her mother; Cancer in her mother; Diabetes in her father and paternal grandmother; Heart disease in her mother; Hypertension in her mother; Other in her mother; Stroke in her mother.,  _______________________________________________________________________  Social History:   reports that she has never smoked. She has never used smokeless tobacco. She reports that she does not drink alcohol and does not use drugs. ,  _______________________________________________________________________  Allergies:  has No Known Allergies. .  _______________________________________________________________________  Current Outpatient Medications   Medication Sig Dispense Refill   • amLODIPine (NORVASC) 10 mg tablet Take 1 tablet (10 mg total) by mouth daily 90 tablet 1   • levonorgestrel (MIRENA) 20 MCG/24HR IUD 1 each by Intrauterine route once     • liraglutide (SAXENDA) injection Inject 0.1 mL (0.6 mg total) under the skin daily 6 mL 1   • lisinopril (ZESTRIL) 10 mg tablet Take 1 tablet (10 mg total) by mouth daily 90 tablet 1   • metFORMIN (GLUCOPHAGE-XR) 750 mg 24 hr tablet Take 1 tablet (750 mg total) by mouth daily with dinner 90 tablet 1   • topiramate (TOPAMAX) 50 MG tablet Take 1 tablet at night 90 tablet 1     No current facility-administered medications for this visit.     _______________________________________________________________________  Review of Systems   Constitutional: Negative for chills and fever. Respiratory: Negative for cough and shortness of breath. Cardiovascular: Negative for chest pain. Gastrointestinal: Negative for abdominal pain and vomiting. Genitourinary: Negative for dysuria. Musculoskeletal: Negative for arthralgias and back pain. Neurological: Negative for headaches. All other systems reviewed and are negative. Objective:  Vitals:    09/28/23 0841 09/28/23 0857   BP: 150/100 138/88   BP Location: Right arm    Pulse: 104    Resp: 16    Temp: 97.8 °F (36.6 °C)    SpO2: 99%    Weight: 118 kg (261 lb)    Height: 5' 6" (1.676 m)      Body mass index is 42.13 kg/m². Physical Exam  Vitals and nursing note reviewed. Constitutional:       General: She is not in acute distress. Appearance: Normal appearance. She is obese. She is not ill-appearing. Cardiovascular:      Rate and Rhythm: Normal rate. Pulmonary:      Effort: Pulmonary effort is normal.   Skin:     General: Skin is warm and dry. Neurological:      Mental Status: She is alert and oriented to person, place, and time. Psychiatric:         Mood and Affect: Mood normal.         Behavior: Behavior normal.         Thought Content: Thought content normal.         Judgment: Judgment normal.               Portions of the above record have been created with voice recognition software.  Occasional wrong word or "sound alike" substitution may have occurred due to the inherent limitations of voice recognition software. Read the chart carefully and recognize, using context, where substitution may have occurred.

## 2023-09-28 NOTE — ASSESSMENT & PLAN NOTE
Slightly elevated today. She did not take her blood pressure medicine just yet. Need to take your blood pressure medicine every day continue to check your blood pressure as discussed during the appointment. Continue to monitor.

## 2023-09-28 NOTE — ASSESSMENT & PLAN NOTE
Has been making healthy food choices and stay active. Does practice intermittent fasting. She has been taking phentermine 30 mg for few months now, but weight is not coming down. She is also taking metformin 750 mg daily with dinner. At this point she would be a good candidate for GLP-1 such as Saxenda. Possible side effects discussed. Patient verbalizes understanding.

## 2023-09-28 NOTE — ASSESSMENT & PLAN NOTE
Continue to make healthy food choices and stay active. Continue to practice intermittent fasting. Continue on metformin 750 mg daily with dinner.

## 2023-09-28 NOTE — ASSESSMENT & PLAN NOTE
Patient failed phentermine. We will initiate Saxenda daily injections. Possible side effects discussed. Patient verbalizes understanding. Follow-up in 4 weeks.

## 2023-10-03 ENCOUNTER — TELEPHONE (OUTPATIENT)
Dept: FAMILY MEDICINE CLINIC | Facility: CLINIC | Age: 38
End: 2023-10-03

## 2023-10-03 NOTE — TELEPHONE ENCOUNTER
Spoke with patient- she stated that the pharmacy told her that the [address] [facility] [address] could not be filled without Sondra's approval. But, I'm wondering if that actually means she requires prior auth? ?    Please advise, thank you!

## 2023-11-03 ENCOUNTER — OFFICE VISIT (OUTPATIENT)
Dept: FAMILY MEDICINE CLINIC | Facility: CLINIC | Age: 38
End: 2023-11-03
Payer: COMMERCIAL

## 2023-11-03 VITALS
HEIGHT: 66 IN | DIASTOLIC BLOOD PRESSURE: 82 MMHG | SYSTOLIC BLOOD PRESSURE: 110 MMHG | HEART RATE: 95 BPM | OXYGEN SATURATION: 99 % | TEMPERATURE: 97 F | WEIGHT: 267.8 LBS | BODY MASS INDEX: 43.04 KG/M2

## 2023-11-03 DIAGNOSIS — E55.9 VITAMIN D DEFICIENCY: ICD-10-CM

## 2023-11-03 DIAGNOSIS — G43.809 OTHER MIGRAINE WITHOUT STATUS MIGRAINOSUS, NOT INTRACTABLE: ICD-10-CM

## 2023-11-03 DIAGNOSIS — R53.83 OTHER FATIGUE: ICD-10-CM

## 2023-11-03 DIAGNOSIS — I10 ESSENTIAL HYPERTENSION: ICD-10-CM

## 2023-11-03 DIAGNOSIS — E11.9 TYPE 2 DIABETES MELLITUS WITHOUT COMPLICATION, WITHOUT LONG-TERM CURRENT USE OF INSULIN (HCC): Primary | ICD-10-CM

## 2023-11-03 DIAGNOSIS — Z00.00 HEALTHCARE MAINTENANCE: ICD-10-CM

## 2023-11-03 DIAGNOSIS — E66.01 MORBID OBESITY (HCC): ICD-10-CM

## 2023-11-03 PROCEDURE — 99214 OFFICE O/P EST MOD 30 MIN: CPT | Performed by: NURSE PRACTITIONER

## 2023-11-03 NOTE — ASSESSMENT & PLAN NOTE
Patient has been on metformin daily with dinner. We will start patient on Rybelsus. Discussed increasing dose each month. Patient will let me know how she is doing with medication. Discussed possible side effects. Patient is to obtain fasting lab. She will return in 3 months for routine follow-up.   Lab Results   Component Value Date    HGBA1C 5.8 (H) 04/12/2023

## 2023-11-03 NOTE — PROGRESS NOTES
Name: Lesia Ac      : 1985      MRN: [id number]  [id number]  Encounter Date: 11/3/2023   Encounter department: [address] [place] [address]     1. Type 2 diabetes mellitus without complication, without long-term current use of insulin ([address] )  Assessment & Plan:  Patient has been on metformin daily with dinner. We will start patient on Rybelsus. Discussed increasing dose each month. Patient will let me know how she is doing with medication. Discussed possible side effects. Patient is to obtain fasting lab. She will return in 3 months for routine follow-up. Lab Results   Component Value Date    HGBA1C 5.8 (H) 2023       Orders:  -     semaglutide (Rybelsus) 3 MG tablet; Take 1 tablet (3 mg total) by mouth daily before breakfast  -     Hemoglobin A1C; Future    2. Vitamin D deficiency  -     Vitamin D 25 hydroxy; Future    3. Other fatigue  -     Vitamin B12; Future    4. Healthcare maintenance  -     CBC and differential; Future  -     Comprehensive metabolic panel; Future  -     TSH, 3rd generation with Free T4 reflex; Future    5. Other migraine without status migrainosus, not intractable  Assessment & Plan:  Doing well with Topamax. 6. Essential hypertension  Assessment & Plan:  Blood pressure is well controlled with amlodipine and lisinopril. 7. Adult BMI 40.0-44.9 kg/sq m [facility])  Assessment & Plan:  Discussed weight loss with diet and exercise. 8. Morbid obesity ([address])      BMI Counseling: Body mass index is 43.22 kg/m². The BMI is above normal. Nutrition recommendations include decreasing portion sizes, decreasing fast food intake and moderation in carbohydrate intake. Exercise recommendations include exercising 3-5 times per week. Pharmacotherapy was ordered to help aid in weight loss. Rationale for BMI follow-up plan is due to patient being overweight or obese.          Subjective Patient presents for follow-up on Saxenda. Patient was unable to obtain medication due to to medication shortage. Patient is currently working night shifts so does not sleep great during the day. Review of Systems   Constitutional:  Positive for fatigue. Negative for chills, diaphoresis and fever. HENT:  Negative for ear pain and sore throat. Respiratory:  Negative for cough and shortness of breath. Cardiovascular:  Negative for chest pain and palpitations. Gastrointestinal:  Negative for abdominal pain, diarrhea, nausea and vomiting. Genitourinary:  Negative for dysuria and hematuria. Musculoskeletal:  Negative for arthralgias and back pain. Skin:  Negative for color change and rash. Neurological:  Positive for headaches. Negative for dizziness, seizures, syncope and light-headedness. Psychiatric/Behavioral:  Positive for sleep disturbance (shift work). Negative for dysphoric mood. The patient is not nervous/anxious. All other systems reviewed and are negative. Current Outpatient Medications on File Prior to Visit   Medication Sig    amLODIPine (NORVASC) 10 mg tablet Take 1 tablet (10 mg total) by mouth daily    levonorgestrel (MIRENA) 20 MCG/24HR IUD 1 each by Intrauterine route once    lisinopril (ZESTRIL) 10 mg tablet Take 1 tablet (10 mg total) by mouth daily    metFORMIN (GLUCOPHAGE-XR) 750 mg 24 hr tablet Take 1 tablet (750 mg total) by mouth daily with dinner    topiramate (TOPAMAX) 50 MG tablet Take 1 tablet at night    [DISCONTINUED] liraglutide (SAXENDA) injection Inject 0.1 mL (0.6 mg total) under the skin daily (Patient not taking: Reported on 11/3/2023)       Objective     /82 (BP Location: Left arm, Patient Position: Sitting, Cuff Size: Standard)   Pulse 95   Temp (!) 97 °F (36.1 °C) (Tympanic)   Ht 5' 6" (1.676 m)   Wt 121 kg (267 lb 12.8 oz)   SpO2 99%   BMI 43.22 kg/m²     Physical Exam  Constitutional:       Appearance: She is well-developed. Cardiovascular:      Rate and Rhythm: Normal rate and regular rhythm. Heart sounds: Normal heart sounds. No murmur heard. Pulmonary:      Effort: Pulmonary effort is normal. No respiratory distress. Breath sounds: Normal breath sounds. Skin:     General: Skin is warm and dry. Neurological:      Mental Status: She is alert and oriented to person, place, and time.        SAGRARIO Avalos

## 2023-11-22 ENCOUNTER — TELEPHONE (OUTPATIENT)
Dept: FAMILY MEDICINE CLINIC | Facility: CLINIC | Age: 38
End: 2023-11-22

## 2023-11-24 ENCOUNTER — TELEPHONE (OUTPATIENT)
Dept: FAMILY MEDICINE CLINIC | Facility: CLINIC | Age: 38
End: 2023-11-24

## 2023-11-24 NOTE — TELEPHONE ENCOUNTER
Called [facility] about prior auth for rybelsus and they stated they did not have it.  Faxed over information about prior auth to [phone]

## 2024-01-04 ENCOUNTER — TELEPHONE (OUTPATIENT)
Dept: FAMILY MEDICINE CLINIC | Facility: CLINIC | Age: 39
End: 2024-01-04

## 2024-01-04 DIAGNOSIS — E11.9 TYPE 2 DIABETES MELLITUS WITHOUT COMPLICATION, WITHOUT LONG-TERM CURRENT USE OF INSULIN (HCC): Primary | ICD-10-CM

## 2024-01-04 RX ORDER — DULAGLUTIDE 0.75 MG/.5ML
0.75 INJECTION, SOLUTION SUBCUTANEOUS
Qty: 6 ML | Refills: 1 | Status: SHIPPED | OUTPATIENT
Start: 2024-01-04

## 2024-01-04 NOTE — TELEPHONE ENCOUNTER
Her insurance is denying Rybelsus because she needs to try something else first. They recommended Trulicity or another option which she could not remember the name of. Please advice.

## 2024-02-05 ENCOUNTER — OFFICE VISIT (OUTPATIENT)
Dept: FAMILY MEDICINE CLINIC | Facility: CLINIC | Age: 39
End: 2024-02-05
Payer: COMMERCIAL

## 2024-02-05 VITALS
OXYGEN SATURATION: 100 % | HEIGHT: 66 IN | BODY MASS INDEX: 43.22 KG/M2 | HEART RATE: 92 BPM | DIASTOLIC BLOOD PRESSURE: 88 MMHG | SYSTOLIC BLOOD PRESSURE: 140 MMHG | RESPIRATION RATE: 12 BRPM | TEMPERATURE: 97.7 F

## 2024-02-05 DIAGNOSIS — G43.809 OTHER MIGRAINE WITHOUT STATUS MIGRAINOSUS, NOT INTRACTABLE: ICD-10-CM

## 2024-02-05 DIAGNOSIS — R73.03 PREDIABETES: ICD-10-CM

## 2024-02-05 DIAGNOSIS — E11.9 TYPE 2 DIABETES MELLITUS WITHOUT COMPLICATION, WITHOUT LONG-TERM CURRENT USE OF INSULIN (HCC): Primary | ICD-10-CM

## 2024-02-05 DIAGNOSIS — E66.01 MORBID OBESITY (HCC): ICD-10-CM

## 2024-02-05 DIAGNOSIS — I10 ESSENTIAL HYPERTENSION: ICD-10-CM

## 2024-02-05 LAB
LEFT EYE DIABETIC RETINOPATHY: NORMAL
LEFT EYE IMAGE QUALITY: NORMAL
LEFT EYE MACULAR EDEMA: NORMAL
LEFT EYE OTHER RETINOPATHY: NORMAL
RIGHT EYE DIABETIC RETINOPATHY: NORMAL
RIGHT EYE IMAGE QUALITY: NORMAL
RIGHT EYE MACULAR EDEMA: NORMAL
RIGHT EYE OTHER RETINOPATHY: NORMAL
SEVERITY (EYE EXAM): NORMAL
SL AMB POCT HEMOGLOBIN AIC: 5.9 (ref ?–6.5)

## 2024-02-05 PROCEDURE — 83036 HEMOGLOBIN GLYCOSYLATED A1C: CPT | Performed by: NURSE PRACTITIONER

## 2024-02-05 PROCEDURE — 92250 FUNDUS PHOTOGRAPHY W/I&R: CPT | Performed by: NURSE PRACTITIONER

## 2024-02-05 PROCEDURE — 99214 OFFICE O/P EST MOD 30 MIN: CPT | Performed by: NURSE PRACTITIONER

## 2024-02-05 RX ORDER — TOPIRAMATE 50 MG/1
TABLET, FILM COATED ORAL
Qty: 90 TABLET | Refills: 1 | Status: SHIPPED | OUTPATIENT
Start: 2024-02-05

## 2024-02-05 RX ORDER — METFORMIN HYDROCHLORIDE 750 MG/1
750 TABLET, EXTENDED RELEASE ORAL
Qty: 90 TABLET | Refills: 1 | Status: SHIPPED | OUTPATIENT
Start: 2024-02-05

## 2024-02-05 NOTE — ASSESSMENT & PLAN NOTE
Patient is considering weight loss surgery.  Patient would like to continue thinking about it and let me know at the next visit she would like referral.  Advised to continue healthy diet and exercise.

## 2024-02-05 NOTE — PROGRESS NOTES
Name: Elena Chiu      : 1985      MRN: [id number]  Encounter Provider: SAGRARIO Mao  Encounter Date: 2024   Encounter department: [facility] [address]    Assessment & Plan     1. Type 2 diabetes mellitus without complication, without long-term current use of insulin (HCC)  Assessment & Plan:  Well-managed with current medications.  Advised patient to call with her next refill of Trulicity and we can increase the dose.  Continue on metformin as well.  Lab Results   Component Value Date    HGBA1C 5.8 (H) 2023       Orders:  -     IRIS Diabetic eye exam  -     Diabetic foot exam; Future  -     POCT hemoglobin A1c  -     Albumin / creatinine urine ratio; Future    2. Prediabetes  -     metFORMIN (GLUCOPHAGE-XR) 750 mg 24 hr tablet; Take 1 tablet (750 mg total) by mouth daily with dinner    3. Morbid obesity (HCC)  Assessment & Plan:  Patient is considering weight loss surgery.  Patient would like to continue thinking about it and let me know at the next visit she would like referral.  Advised to continue healthy diet and exercise.    Orders:  -     topiramate (TOPAMAX) 50 MG tablet; Take 1 tablet at night    4. Other migraine without status migrainosus, not intractable  Assessment & Plan:  Doing okay with Topamax.    Orders:  -     topiramate (TOPAMAX) 50 MG tablet; Take 1 tablet at night    5. Essential hypertension  Assessment & Plan:  Blood pressure slightly elevated on exam today.  Advised to continue on amlodipine and lisinopril.  Advised to monitor blood pressure at home.             Subjective      Patient presents for routine follow-up.  She is considering weight loss surgery as she is struggling with meaningful weight loss.  She is otherwise feeling well and has no concerns for today.    Review of Systems   Constitutional:  Negative for chills, diaphoresis and fever.   HENT:  Negative for ear pain and sore throat.    Eyes:  Negative for  "pain and visual disturbance.   Respiratory:  Negative for cough and shortness of breath.    Cardiovascular:  Negative for chest pain and palpitations.   Gastrointestinal:  Negative for abdominal pain and vomiting.   Genitourinary:  Negative for dysuria and hematuria.   Musculoskeletal:  Negative for arthralgias and back pain.   Skin:  Negative for color change and rash.   Neurological:  Negative for dizziness, light-headedness and headaches.   Psychiatric/Behavioral:  Positive for sleep disturbance. Negative for dysphoric mood. The patient is not nervous/anxious.    All other systems reviewed and are negative.      Current Outpatient Medications on File Prior to Visit   Medication Sig   • amLODIPine (NORVASC) 10 mg tablet Take 1 tablet (10 mg total) by mouth daily   • dulaglutide (Trulicity) 0.75 MG/0.5ML injection Inject 0.5 mL (0.75 mg total) under the skin every 7 days   • levonorgestrel (MIRENA) 20 MCG/24HR IUD 1 each by Intrauterine route once   • lisinopril (ZESTRIL) 10 mg tablet Take 1 tablet (10 mg total) by mouth daily   • [DISCONTINUED] metFORMIN (GLUCOPHAGE-XR) 750 mg 24 hr tablet Take 1 tablet (750 mg total) by mouth daily with dinner   • [DISCONTINUED] topiramate (TOPAMAX) 50 MG tablet Take 1 tablet at night       Objective     /88   Pulse 92   Temp 97.7 °F (36.5 °C)   Resp 12   Ht 5' 6\" (1.676 m)   SpO2 100%   BMI 43.22 kg/m²     Physical Exam  Constitutional:       Appearance: She is well-developed. She is obese.   Cardiovascular:      Rate and Rhythm: Normal rate and regular rhythm.      Pulses: no weak pulses          Dorsalis pedis pulses are 2+ on the right side and 2+ on the left side.        Posterior tibial pulses are 2+ on the right side and 2+ on the left side.      Heart sounds: Normal heart sounds. No murmur heard.  Pulmonary:      Effort: Pulmonary effort is normal. No respiratory distress.      Breath sounds: Normal breath sounds.   Feet:      Right foot:      Skin integrity: " No ulcer, skin breakdown, erythema, warmth, callus or dry skin.      Left foot:      Skin integrity: No ulcer, skin breakdown, erythema, warmth, callus or dry skin.   Skin:     General: Skin is warm and dry.   Neurological:      Mental Status: She is alert and oriented to person, place, and time.   Diabetic Foot Exam    Patient's shoes and socks removed.    Right Foot/Ankle   Right Foot Inspection  Skin Exam: skin normal and skin intact. No dry skin, no warmth, no callus, no erythema, no maceration, no abnormal color, no pre-ulcer, no ulcer and no callus.     Toe Exam: ROM and strength within normal limits.     Sensory   Vibration: intact  Proprioception: intact  Monofilament testing: intact    Vascular  Capillary refills: < 3 seconds  The right DP pulse is 2+. The right PT pulse is 2+.     Left Foot/Ankle  Left Foot Inspection  Skin Exam: skin normal and skin intact. No dry skin, no warmth, no erythema, no maceration, normal color, no pre-ulcer, no ulcer and no callus.     Toe Exam: ROM and strength within normal limits.     Sensory   Vibration: intact  Proprioception: intact  Monofilament testing: intact    Vascular  Capillary refills: < 3 seconds  The left DP pulse is 2+. The left PT pulse is 2+.     Assign Risk Category  No deformity present  No loss of protective sensation  No weak pulses  Risk: 0    SAGRARIO Mao

## 2024-02-05 NOTE — ASSESSMENT & PLAN NOTE
Blood pressure slightly elevated on exam today.  Advised to continue on amlodipine and lisinopril.  Advised to monitor blood pressure at home.

## 2024-02-05 NOTE — ASSESSMENT & PLAN NOTE
Well-managed with current medications.  Advised patient to call with her next refill of Trulicity and we can increase the dose.  Continue on metformin as well.  Lab Results   Component Value Date    HGBA1C 5.8 (H) 04/12/2023

## 2024-04-27 ENCOUNTER — HOSPITAL ENCOUNTER (EMERGENCY)
Facility: HOSPITAL | Age: 39
Discharge: HOME | End: 2024-04-27
Attending: EMERGENCY MEDICINE
Payer: COMMERCIAL

## 2024-04-27 ENCOUNTER — APPOINTMENT (EMERGENCY)
Dept: CT IMAGING | Facility: HOSPITAL | Age: 39
End: 2024-04-27
Payer: COMMERCIAL

## 2024-04-27 VITALS
DIASTOLIC BLOOD PRESSURE: 91 MMHG | TEMPERATURE: 98.3 F | OXYGEN SATURATION: 100 % | RESPIRATION RATE: 18 BRPM | SYSTOLIC BLOOD PRESSURE: 191 MMHG | HEART RATE: 95 BPM

## 2024-04-27 DIAGNOSIS — R31.9 HEMATURIA, UNSPECIFIED TYPE: Primary | ICD-10-CM

## 2024-04-27 LAB
AMORPH URATE CRY URNS QL MICRO: ABNORMAL
ANION GAP SERPL CALCULATED.3IONS-SCNC: 5 MMOL/L (ref 4–13)
BACTERIA UR QL AUTO: ABNORMAL /HPF
BASOPHILS # BLD AUTO: 0.01 THOUSANDS/ÂΜL (ref 0–0.1)
BASOPHILS NFR BLD AUTO: 0 % (ref 0–1)
BILIRUB UR QL STRIP: NEGATIVE
BUN SERPL-MCNC: 13 MG/DL (ref 5–25)
CALCIUM SERPL-MCNC: 8.8 MG/DL (ref 8.4–10.2)
CHLORIDE SERPL-SCNC: 104 MMOL/L (ref 96–108)
CLARITY UR: ABNORMAL
CO2 SERPL-SCNC: 29 MMOL/L (ref 21–32)
COLOR UR: YELLOW
CREAT SERPL-MCNC: 0.82 MG/DL (ref 0.6–1.3)
EOSINOPHIL # BLD AUTO: 0.23 THOUSAND/ÂΜL (ref 0–0.61)
EOSINOPHIL NFR BLD AUTO: 4 % (ref 0–6)
ERYTHROCYTE [DISTWIDTH] IN BLOOD BY AUTOMATED COUNT: 12.2 % (ref 11.6–15.1)
EXT PREGNANCY TEST URINE: NEGATIVE
EXT. CONTROL: NORMAL
GFR SERPL CREATININE-BSD FRML MDRD: 91 ML/MIN/1.73SQ M
GLUCOSE SERPL-MCNC: 106 MG/DL (ref 65–140)
GLUCOSE UR STRIP-MCNC: NEGATIVE MG/DL
HCT VFR BLD AUTO: 44.1 % (ref 34.8–46.1)
HGB BLD-MCNC: 13.5 G/DL (ref 11.5–15.4)
HGB UR QL STRIP.AUTO: ABNORMAL
IMM GRANULOCYTES # BLD AUTO: 0 THOUSAND/UL (ref 0–0.2)
IMM GRANULOCYTES NFR BLD AUTO: 0 % (ref 0–2)
KETONES UR STRIP-MCNC: NEGATIVE MG/DL
LEUKOCYTE ESTERASE UR QL STRIP: NEGATIVE
LYMPHOCYTES # BLD AUTO: 2.58 THOUSANDS/ÂΜL (ref 0.6–4.47)
LYMPHOCYTES NFR BLD AUTO: 45 % (ref 14–44)
MCH RBC QN AUTO: 27.2 PG (ref 26.8–34.3)
MCHC RBC AUTO-ENTMCNC: 30.6 G/DL (ref 31.4–37.4)
MCV RBC AUTO: 89 FL (ref 82–98)
MONOCYTES # BLD AUTO: 0.57 THOUSAND/ÂΜL (ref 0.17–1.22)
MONOCYTES NFR BLD AUTO: 10 % (ref 4–12)
MUCOUS THREADS UR QL AUTO: ABNORMAL
NEUTROPHILS # BLD AUTO: 2.37 THOUSANDS/ÂΜL (ref 1.85–7.62)
NEUTS SEG NFR BLD AUTO: 41 % (ref 43–75)
NITRITE UR QL STRIP: NEGATIVE
NON-SQ EPI CELLS URNS QL MICRO: ABNORMAL /HPF
NRBC BLD AUTO-RTO: 0 /100 WBCS
PH UR STRIP.AUTO: 7.5 [PH]
PLATELET # BLD AUTO: 176 THOUSANDS/UL (ref 149–390)
PMV BLD AUTO: 12.9 FL (ref 8.9–12.7)
POTASSIUM SERPL-SCNC: 4.3 MMOL/L (ref 3.5–5.3)
PROT UR STRIP-MCNC: ABNORMAL MG/DL
RBC # BLD AUTO: 4.97 MILLION/UL (ref 3.81–5.12)
RBC #/AREA URNS AUTO: ABNORMAL /HPF
SODIUM SERPL-SCNC: 138 MMOL/L (ref 135–147)
SP GR UR STRIP.AUTO: 1.03 (ref 1–1.03)
UROBILINOGEN UR STRIP-ACNC: <2 MG/DL
WBC # BLD AUTO: 5.76 THOUSAND/UL (ref 4.31–10.16)
WBC #/AREA URNS AUTO: ABNORMAL /HPF

## 2024-04-27 PROCEDURE — 85025 COMPLETE CBC W/AUTO DIFF WBC: CPT | Performed by: EMERGENCY MEDICINE

## 2024-04-27 PROCEDURE — 81001 URINALYSIS AUTO W/SCOPE: CPT | Performed by: EMERGENCY MEDICINE

## 2024-04-27 PROCEDURE — 81025 URINE PREGNANCY TEST: CPT | Performed by: EMERGENCY MEDICINE

## 2024-04-27 PROCEDURE — 99284 EMERGENCY DEPT VISIT MOD MDM: CPT | Performed by: EMERGENCY MEDICINE

## 2024-04-27 PROCEDURE — 87086 URINE CULTURE/COLONY COUNT: CPT | Performed by: EMERGENCY MEDICINE

## 2024-04-27 PROCEDURE — 80048 BASIC METABOLIC PNL TOTAL CA: CPT | Performed by: EMERGENCY MEDICINE

## 2024-04-27 PROCEDURE — 74176 CT ABD & PELVIS W/O CONTRAST: CPT

## 2024-04-27 PROCEDURE — 99283 EMERGENCY DEPT VISIT LOW MDM: CPT

## 2024-04-27 PROCEDURE — 36415 COLL VENOUS BLD VENIPUNCTURE: CPT | Performed by: EMERGENCY MEDICINE

## 2024-04-27 RX ORDER — CEPHALEXIN 500 MG/1
500 CAPSULE ORAL EVERY 8 HOURS SCHEDULED
Qty: 21 CAPSULE | Refills: 0 | Status: SHIPPED | OUTPATIENT
Start: 2024-04-27 | End: 2024-05-04

## 2024-04-27 RX ORDER — CEPHALEXIN 250 MG/1
500 CAPSULE ORAL ONCE
Status: COMPLETED | OUTPATIENT
Start: 2024-04-27 | End: 2024-04-27

## 2024-04-27 RX ADMIN — CEPHALEXIN 500 MG: 250 CAPSULE ORAL at 22:33

## 2024-04-28 NOTE — ED PROVIDER NOTES
History  Chief Complaint   Patient presents with    Possible UTI     Pt started with symptoms about a week ago. Pt has slight burning with urination, blood in urine and flank pain        History provided by:  Patient  Blood in Urine  This is a new problem. The current episode started in the past 7 days. The problem is unchanged. She describes the hematuria as gross hematuria. The hematuria occurs during the initial portion of her urinary stream. She reports no clotting in her urine stream. The pain is mild. She describes her urine color as light pink. Irritative symptoms include frequency. Irritative symptoms do not include nocturia or urgency. Pertinent negatives include no abdominal pain, bladder pain, fever, flank pain, genital pain, inability to urinate or vomiting. There is no history of kidney stones or STDs.       Prior to Admission Medications   Prescriptions Last Dose Informant Patient Reported? Taking?   amLODIPine (NORVASC) 10 mg tablet   No No   Sig: Take 1 tablet (10 mg total) by mouth daily   dulaglutide (Trulicity) 0.75 MG/0.5ML injection   No No   Sig: Inject 0.5 mL (0.75 mg total) under the skin every 7 days   levonorgestrel (MIRENA) 20 MCG/24HR IUD   Yes No   Si each by Intrauterine route once   lisinopril (ZESTRIL) 10 mg tablet   No No   Sig: Take 1 tablet (10 mg total) by mouth daily   metFORMIN (GLUCOPHAGE-XR) 750 mg 24 hr tablet   No No   Sig: Take 1 tablet (750 mg total) by mouth daily with dinner   topiramate (TOPAMAX) 50 MG tablet   No No   Sig: Take 1 tablet at night      Facility-Administered Medications: None       Past Medical History:   Diagnosis Date    Asthma     childhood    Diabetes mellitus (HCC)     gestational    Elective      Resolved 2016     H/O gestational diabetes mellitus, not currently pregnant 2019    Hypertension     Menorrhagia with regular cycle 2018    Migraine     Morbid obesity with BMI of 45.0-49.9, adult (HCC)     Prediabetes 2021     Varicella     childhood       Past Surgical History:   Procedure Laterality Date    INDUCED          Family History   Problem Relation Age of Onset    Cancer Mother     Heart disease Mother     Stroke Mother     Other Mother     Hypertension Mother     Breast cancer Mother     Diabetes Father     Asthma Sister     Asthma Brother     Diabetes Paternal Grandmother     Asthma Brother     Asthma Brother     Asthma Sister     Asthma Sister      I have reviewed and agree with the history as documented.    E-Cigarette/Vaping    E-Cigarette Use Never User      E-Cigarette/Vaping Substances    Nicotine No     THC No     CBD No     Flavoring No     Other No      Social History     Tobacco Use    Smoking status: Never    Smokeless tobacco: Never   Vaping Use    Vaping status: Never Used   Substance Use Topics    Alcohol use: No    Drug use: No       Review of Systems   Constitutional:  Negative for fever.   Gastrointestinal:  Negative for abdominal pain and vomiting.   Genitourinary:  Positive for frequency and hematuria. Negative for flank pain, nocturia and urgency.   All other systems reviewed and are negative.      Physical Exam  Physical Exam  Vitals and nursing note reviewed.   Constitutional:       General: She is not in acute distress.     Appearance: She is well-developed. She is not diaphoretic.   HENT:      Head: Normocephalic and atraumatic.      Nose: Nose normal.   Eyes:      Extraocular Movements: Extraocular movements intact.      Conjunctiva/sclera: Conjunctivae normal.   Cardiovascular:      Rate and Rhythm: Normal rate and regular rhythm.      Heart sounds: Normal heart sounds.   Pulmonary:      Effort: Pulmonary effort is normal. No respiratory distress.      Breath sounds: Normal breath sounds.   Abdominal:      General: There is no distension.      Palpations: Abdomen is soft.      Tenderness: There is no abdominal tenderness.   Musculoskeletal:         General: No deformity. Normal range of  motion.      Cervical back: Neck supple.   Skin:     General: Skin is warm and dry.   Neurological:      General: No focal deficit present.      Mental Status: She is alert.   Psychiatric:         Mood and Affect: Mood normal.         Vital Signs  ED Triage Vitals [04/27/24 1928]   Temperature Pulse Respirations Blood Pressure SpO2   98.3 °F (36.8 °C) 95 18 (!) 191/91 100 %      Temp Source Heart Rate Source Patient Position - Orthostatic VS BP Location FiO2 (%)   Temporal Monitor Sitting Left arm --      Pain Score       --           Vitals:    04/27/24 1928   BP: (!) 191/91   Pulse: 95   Patient Position - Orthostatic VS: Sitting         Visual Acuity      ED Medications  Medications   cephalexin (KEFLEX) capsule 500 mg (500 mg Oral Given 4/27/24 2233)       Diagnostic Studies  Results Reviewed       Procedure Component Value Units Date/Time    Basic metabolic panel [[id number]] Collected: 04/27/24 2125    Lab Status: Final result Specimen: Blood from Arm, Right Updated: 04/27/24 2205     Sodium 138 mmol/L      Potassium 4.3 mmol/L      Chloride 104 mmol/L      CO2 29 mmol/L      ANION GAP 5 mmol/L      BUN 13 mg/dL      Creatinine 0.82 mg/dL      Glucose 106 mg/dL      Calcium 8.8 mg/dL      eGFR 91 ml/min/1.73sq m     Narrative:      National Kidney Disease Foundation guidelines for Chronic Kidney Disease (CKD):     Stage 1 with normal or high GFR (GFR > 90 mL/min/1.73 square meters)    Stage 2 Mild CKD (GFR = 60-89 mL/min/1.73 square meters)    Stage 3A Moderate CKD (GFR = 45-59 mL/min/1.73 square meters)    Stage 3B Moderate CKD (GFR = 30-44 mL/min/1.73 square meters)    Stage 4 Severe CKD (GFR = 15-29 mL/min/1.73 square meters)    Stage 5 End Stage CKD (GFR <15 mL/min/1.73 square meters)  Note: GFR calculation is accurate only with a steady state creatinine    CBC and differential [[id number]]  (Abnormal) Collected: 04/27/24 2034    Lab Status: Final result Specimen: Blood from Arm, Left Updated: 04/27/24 2039      WBC 5.76 Thousand/uL      RBC 4.97 Million/uL      Hemoglobin 13.5 g/dL      Hematocrit 44.1 %      MCV 89 fL      MCH 27.2 pg      MCHC 30.6 g/dL      RDW 12.2 %      MPV 12.9 fL      Platelets 176 Thousands/uL      nRBC 0 /100 WBCs      Segmented % 41 %      Immature Grans % 0 %      Lymphocytes % 45 %      Monocytes % 10 %      Eosinophils Relative 4 %      Basophils Relative 0 %      Absolute Neutrophils 2.37 Thousands/µL      Absolute Immature Grans 0.00 Thousand/uL      Absolute Lymphocytes 2.58 Thousands/µL      Absolute Monocytes 0.57 Thousand/µL      Eosinophils Absolute 0.23 Thousand/µL      Basophils Absolute 0.01 Thousands/µL     Urine Microscopic [[id number]]  (Abnormal) Collected: 04/27/24 1938    Lab Status: Final result Specimen: Urine, Clean Catch Updated: 04/27/24 2015     RBC, UA 2-4 /hpf      WBC, UA 4-10 /hpf      Epithelial Cells Occasional /hpf      Bacteria, UA Occasional /hpf      MUCUS THREADS Occasional     Amorphous Crystals, UA Occasional    UA w Reflex to Microscopic w Reflex to Culture [[id number]]  (Abnormal) Collected: 04/27/24 1938    Lab Status: Final result Specimen: Urine, Clean Catch Updated: 04/27/24 2004     Color, UA Yellow     Clarity, UA Turbid     Specific Gravity, UA 1.026     pH, UA 7.5     Leukocytes, UA Negative     Nitrite, UA Negative     Protein, UA Trace mg/dl      Glucose, UA Negative mg/dl      Ketones, UA Negative mg/dl      Urobilinogen, UA <2.0 mg/dl      Bilirubin, UA Negative     Occult Blood, UA Moderate    POCT pregnancy, urine [[id number]]  (Normal) Resulted: 04/27/24 1943    Lab Status: Final result Updated: 04/27/24 1943     EXT Preg Test, Ur Negative     Control Valid    Urine culture [[id number]] Collected: 04/27/24 1938    Lab Status: In process Specimen: Urine, Clean Catch Updated: 04/27/24 1941                   CT renal stone study abdomen pelvis without contrast   Final Result by Lyssa Newell MD (04/27 2149)      No evidence of stone or  hydronephrosis         Workstation performed: [id number]                    Procedures  Procedures         ED Course                               SBIRT 20yo+      Flowsheet Row Most Recent Value   Initial Alcohol Screen: US AUDIT-C     1. How often do you have a drink containing alcohol? 0 Filed at: 04/27/2024 1932   2. How many drinks containing alcohol do you have on a typical day you are drinking?  0 Filed at: 04/27/2024 1932   3b. FEMALE Any Age, or MALE 65+: How often do you have 4 or more drinks on one occassion? 0 Filed at: 04/27/2024 1932   Audit-C Score 0 Filed at: 04/27/2024 1932   KAZ: How many times in the past year have you...    Used an illegal drug or used a prescription medication for non-medical reasons? Never Filed at: 04/27/2024 1932                      Medical Decision Making  39yo female p/w hematuria and some urgency and frequency for the past week. No fever/chills/n/v/d. Occasionally get pain in the flank. Has a mirena. Not having any vaginal bleeding. Will evaluate for UTI vs potential stone. Will get UA and then CBC for hgb and plt and chem for renal fxn. CT scan for potential stone.     Amount and/or Complexity of Data Reviewed  Labs: ordered.  Radiology: ordered.    Risk  Prescription drug management.             Disposition  Final diagnoses:   Hematuria, unspecified type     Time reflects when diagnosis was documented in both MDM as applicable and the Disposition within this note       Time User Action Codes Description Comment    4/27/2024 10:23 PM Sheryl Valdez Add [R31.9] Hematuria, unspecified type           ED Disposition       ED Disposition   Discharge    Condition   Stable    Date/Time   Sat Apr 27, 2024 2223    Comment   Elena Chiu discharge to home/self care.                   Follow-up Information       Follow up With Specialties Details Why Contact Info    [facility] Go to  If symptoms worsen [address] [place] [address]  [id number]  [phone]              Discharge Medication List as of 4/27/2024 10:24 PM        START taking these medications    Details   cephalexin (KEFLEX) 500 mg capsule Take 1 capsule (500 mg total) by mouth every 8 (eight) hours for 7 days, Starting Sat 4/27/2024, Until Sat 5/4/2024, Normal           CONTINUE these medications which have NOT CHANGED    Details   amLODIPine (NORVASC) 10 mg tablet Take 1 tablet (10 mg total) by mouth daily, Starting Tue 6/20/2023, Normal      dulaglutide (Trulicity) 0.75 MG/0.5ML injection Inject 0.5 mL (0.75 mg total) under the skin every 7 days, Starting Thu 1/4/2024, Normal      levonorgestrel (MIRENA) 20 MCG/24HR IUD 1 each by Intrauterine route once, Historical Med      lisinopril (ZESTRIL) 10 mg tablet Take 1 tablet (10 mg total) by mouth daily, Starting Tue 6/20/2023, Normal      metFORMIN (GLUCOPHAGE-XR) 750 mg 24 hr tablet Take 1 tablet (750 mg total) by mouth daily with dinner, Starting Mon 2/5/2024, Normal      topiramate (TOPAMAX) 50 MG tablet Take 1 tablet at night, Normal             No discharge procedures on file.    PDMP Review         Value Time User    PDMP Reviewed  Yes 9/28/2023  8:45 AM S[facility] Rose            ED Provider  Electronically Signed by             Sheryl Valdez MD  04/27/24 0715

## 2024-04-29 LAB — BACTERIA UR CULT: NORMAL

## 2024-05-15 ENCOUNTER — HOSPITAL ENCOUNTER (OUTPATIENT)
Dept: MAMMOGRAPHY | Facility: CLINIC | Age: 39
Discharge: HOME | End: 2024-05-15
Payer: COMMERCIAL

## 2024-05-15 DIAGNOSIS — Z12.31 SCREENING MAMMOGRAM FOR BREAST CANCER: ICD-10-CM

## 2024-05-15 PROCEDURE — 77063 BREAST TOMOSYNTHESIS BI: CPT

## 2024-05-15 PROCEDURE — 77067 SCR MAMMO BI INCL CAD: CPT

## 2024-05-24 ENCOUNTER — HOSPITAL ENCOUNTER (OUTPATIENT)
Dept: ULTRASOUND IMAGING | Facility: CLINIC | Age: 39
End: 2024-05-24
Payer: COMMERCIAL

## 2024-05-24 ENCOUNTER — HOSPITAL ENCOUNTER (OUTPATIENT)
Dept: MAMMOGRAPHY | Facility: CLINIC | Age: 39
End: 2024-05-24
Payer: COMMERCIAL

## 2024-05-24 DIAGNOSIS — R92.8 ABNORMAL MAMMOGRAM: ICD-10-CM

## 2024-05-24 PROCEDURE — 77065 DX MAMMO INCL CAD UNI: CPT

## 2024-05-24 PROCEDURE — G0279 TOMOSYNTHESIS, MAMMO: HCPCS

## 2024-05-24 PROCEDURE — 76642 ULTRASOUND BREAST LIMITED: CPT

## 2024-06-06 ENCOUNTER — OFFICE VISIT (OUTPATIENT)
Dept: FAMILY MEDICINE CLINIC | Facility: CLINIC | Age: 39
End: 2024-06-06
Payer: COMMERCIAL

## 2024-06-06 ENCOUNTER — TELEPHONE (OUTPATIENT)
Age: 39
End: 2024-06-06

## 2024-06-06 VITALS
BODY MASS INDEX: 43.23 KG/M2 | SYSTOLIC BLOOD PRESSURE: 134 MMHG | OXYGEN SATURATION: 96 % | TEMPERATURE: 98.7 F | HEART RATE: 90 BPM | DIASTOLIC BLOOD PRESSURE: 86 MMHG | HEIGHT: 66 IN | WEIGHT: 269 LBS

## 2024-06-06 DIAGNOSIS — G43.809 OTHER MIGRAINE WITHOUT STATUS MIGRAINOSUS, NOT INTRACTABLE: ICD-10-CM

## 2024-06-06 DIAGNOSIS — E11.9 TYPE 2 DIABETES MELLITUS WITHOUT COMPLICATION, WITHOUT LONG-TERM CURRENT USE OF INSULIN (HCC): Primary | ICD-10-CM

## 2024-06-06 DIAGNOSIS — R06.83 SNORING: ICD-10-CM

## 2024-06-06 DIAGNOSIS — E66.01 MORBID OBESITY (HCC): ICD-10-CM

## 2024-06-06 DIAGNOSIS — E66.01 CLASS 3 SEVERE OBESITY DUE TO EXCESS CALORIES WITHOUT SERIOUS COMORBIDITY WITH BODY MASS INDEX (BMI) OF 40.0 TO 44.9 IN ADULT (HCC): ICD-10-CM

## 2024-06-06 DIAGNOSIS — I10 ESSENTIAL HYPERTENSION: ICD-10-CM

## 2024-06-06 LAB — SL AMB POCT HEMOGLOBIN AIC: 5.8 (ref ?–6.5)

## 2024-06-06 PROCEDURE — 83036 HEMOGLOBIN GLYCOSYLATED A1C: CPT | Performed by: NURSE PRACTITIONER

## 2024-06-06 PROCEDURE — 99214 OFFICE O/P EST MOD 30 MIN: CPT | Performed by: NURSE PRACTITIONER

## 2024-06-06 RX ORDER — PHENTERMINE HYDROCHLORIDE 15 MG/1
15 CAPSULE ORAL EVERY MORNING
Qty: 30 CAPSULE | Refills: 0 | Status: SHIPPED | OUTPATIENT
Start: 2024-06-06

## 2024-06-06 NOTE — ASSESSMENT & PLAN NOTE
She is a current migraine which seems to be stress-induced.  She continues on Topamax and feels like this is helping.

## 2024-06-06 NOTE — ASSESSMENT & PLAN NOTE
Patient has been on phentermine in the past and did well with it.  Will start on phentermine daily. Advised to keep food diary-- My Fitness Pal Yordan or Lose It Yordan. Stick to 0040-3710 calorie diet. Advised to monitor BP at home. Will f/u in 1 month for weight check.

## 2024-06-06 NOTE — ASSESSMENT & PLAN NOTE
A1c is well-managed with metformin.  Advise low carbohydrate diet.  Lab Results   Component Value Date    HGBA1C 5.8 06/06/2024

## 2024-06-06 NOTE — PROGRESS NOTES
Assessment/Plan:     Chronic Problems:  Essential hypertension  Blood pressure is well-managed with amlodipine and lisinopril.  Advised to monitor blood pressures at home as we are restarting phentermine.    Migraine  She is a current migraine which seems to be stress-induced.  She continues on Topamax and feels like this is helping.    Type 2 diabetes mellitus without complication, without long-term current use of insulin ([facility])  A1c is well-managed with metformin.  Advise low carbohydrate diet.  Lab Results   Component Value Date    HGBA1C 5.8 06/06/2024       Morbid obesity (HCC)  Patient has been on phentermine in the past and did well with it.  Will start on phentermine daily. Advised to keep food diary-- My Fitness Pal Yordan or Lose It Yordan. Stick to 8167-0383 calorie diet. Advised to monitor BP at home. Will f/u in 1 month for weight check.       Visit Diagnosis:  Diagnoses and all orders for this visit:    Type 2 diabetes mellitus without complication, without long-term current use of insulin ([facility])  -     POCT hemoglobin A1c    Class 3 severe obesity due to excess calories without serious comorbidity with body mass index (BMI) of 40.0 to 44.9 in adult ([facility])  -     Ambulatory Referral to Weight Management; Future  -     phentermine 15 MG capsule; Take 1 capsule (15 mg total) by mouth every morning    Snoring  -     Ambulatory Referral to Sleep Medicine; Future    Essential hypertension    Other migraine without status migrainosus, not intractable    Morbid obesity ([facility])          Subjective:    Patient ID: Elena Chiu is a 38 y.o. female.    Patient presents for routine follow up.  She is requesting a sleep study.  She she does snore and has unrefreshing sleep.  Excessive daytime sleepiness. She does work nights and does not sleep well at all.  She did stop Trulicity as she felt it was not really helping and her insurance is no longer covering it.        The following portions of the patient's history  "were reviewed and updated as appropriate: allergies, current medications, past family history, past medical history, past social history, past surgical history and problem list.    Review of Systems   Constitutional:  Positive for fatigue. Negative for chills, diaphoresis and fever.   HENT:  Negative for ear pain and sore throat.    Eyes:  Negative for pain and visual disturbance.   Respiratory:  Negative for cough and shortness of breath.    Cardiovascular:  Negative for chest pain and palpitations.   Gastrointestinal:  Negative for abdominal pain and vomiting.   Genitourinary:  Negative for dysuria and hematuria.   Musculoskeletal:  Negative for arthralgias and back pain.   Skin:  Negative for color change and rash.   Neurological:  Negative for dizziness, seizures, syncope, light-headedness and headaches.   Psychiatric/Behavioral:  Positive for sleep disturbance. Negative for dysphoric mood. The patient is not nervous/anxious.    All other systems reviewed and are negative.        /86 (BP Location: Right arm, Patient Position: Sitting, Cuff Size: Large)   Pulse 90   Temp 98.7 °F (37.1 °C) (Tympanic)   Ht 5' 6\" (1.676 m)   Wt 122 kg (269 lb)   LMP 04/15/2024 (Approximate)   SpO2 96%   BMI 43.42 kg/m²   Social History     Socioeconomic History    Marital status: /Civil Union     Spouse name: Not on file    Number of children: Not on file    Years of education: Not on file    Highest education level: Not on file   Occupational History    Not on file   Tobacco Use    Smoking status: Never    Smokeless tobacco: Never   Vaping Use    Vaping status: Never Used   Substance and Sexual Activity    Alcohol use: No    Drug use: No    Sexual activity: Yes     Partners: Male     Birth control/protection: I.U.D.     Comment:    Other Topics Concern    Not on file   Social History Narrative    Always uses seat belt     Social Determinants of Health     Financial Resource Strain: Not on file   Food " Insecurity: Not on file   Transportation Needs: Not on file   Physical Activity: Not on file   Stress: Not on file   Social Connections: Not on file   Intimate Partner Violence: Not on file   Housing Stability: Not on file     Past Medical History:   Diagnosis Date    Asthma     childhood    Diabetes mellitus (HCC)     gestational    Elective      Resolved 2016     H/O gestational diabetes mellitus, not currently pregnant 2019    Hypertension     Menorrhagia with regular cycle 2018    Migraine     Morbid obesity with BMI of 45.0-49.9, adult (HCC)     Prediabetes 2021    Varicella     childhood     Family History   Problem Relation Age of Onset    Cancer Mother     Heart disease Mother     Stroke Mother     Other Mother     Hypertension Mother     Breast cancer Mother 38    Diabetes Father     Asthma Sister     Asthma Sister     Asthma Sister     Diabetes Paternal Grandmother     Asthma Brother     Asthma Brother     Asthma Brother      Past Surgical History:   Procedure Laterality Date    INDUCED          Current Outpatient Medications:     amLODIPine (NORVASC) 10 mg tablet, Take 1 tablet (10 mg total) by mouth daily, Disp: 90 tablet, Rfl: 1    levonorgestrel (MIRENA) 20 MCG/24HR IUD, 1 each by Intrauterine route once, Disp: , Rfl:     lisinopril (ZESTRIL) 10 mg tablet, Take 1 tablet (10 mg total) by mouth daily, Disp: 90 tablet, Rfl: 1    metFORMIN (GLUCOPHAGE-XR) 750 mg 24 hr tablet, Take 1 tablet (750 mg total) by mouth daily with dinner, Disp: 90 tablet, Rfl: 1    phentermine 15 MG capsule, Take 1 capsule (15 mg total) by mouth every morning, Disp: 30 capsule, Rfl: 0    topiramate (TOPAMAX) 50 MG tablet, Take 1 tablet at night, Disp: 90 tablet, Rfl: 1    No Known Allergies       Lab Review   not applicable     Imaging: US breast left limited (diagnostic), Mammo diagnostic left w 3d & cad    Result Date: 2024  Narrative: DIAGNOSIS: Abnormal mammogram TECHNIQUE: Digital  diagnostic mammography was performed. Computer Aided Detection (CAD) analyzed all applicable images. Left breast ultrasound was performed. COMPARISONS: Prior breast imaging dated: 05/15/2024 RELEVANT HISTORY: Family Breast Cancer History: History of breast cancer in Mother. Family Medical History: Family medical history includes breast cancer in mother. Personal History: Hormone history includes birth control. No known relevant surgical history. No known relevant medical history. RISK ASSESSMENT: 5 Year Tyrer-Cuzick: 1.47% 10 Year Tyrer-Cuzick: 3.75% Lifetime Tyrer-Cuzick: 31.01% TISSUE DENSITY: There are scattered areas of fibroglandular density. INDICATION: Elena Chiu is a 38 y.o. female presenting for abnormal mammo. FINDINGS: LEFT 1) MASS [A] Mammo diagnostic left w 3d & cad: There is an 11 mm oval mass with obscured margins seen in the outer region of the left breast at 3 o'clock in the middle depth.  There is no correlate on ultrasound.  This is probably benign. US breast left limited (diagnostic): Targeted ultrasound was performed to evaluate the outer breast from 1:00 to 4:00.  No sonographic abnormality was seen.     Impression: Left breast finding is probably benign.  6-month follow-up mammogram is recommended.  The patient is aware of this recommendation. This patient has been identified as being at elevated risk for development of breast cancer based on the Tyrer-Cuzick model. She may benefit from supplemental screening.  Given the increased estimated lifetime risk of malignancy, she may benefit from high risk screening with annual MRI. ASSESSMENT/BI-RADS CATEGORY: Left: 3 - Probably Benign Overall: 3 - Probably Benign RECOMMENDATION:      - Diagnostic mammogram in 6 months for the left breast. -Consider high rescreening with annual MRI Workstation ID: [id number]     Mammo screening bilateral w 3d & cad    Result Date: 5/17/2024  Narrative: DIAGNOSIS: Screening mammogram for breast cancer  TECHNIQUE: Digital screening mammography was performed. Computer Aided Detection (CAD) analyzed all applicable images. COMPARISONS: None available. RELEVANT HISTORY: Family Breast Cancer History: History of breast cancer in Mother. Family Medical History: Family medical history includes breast cancer in mother. Personal History: Hormone history includes birth control. No known relevant surgical history. No known relevant medical history. The patient is scheduled in a reminder system for screening mammography. 8-10% of cancers will be missed on mammography. Management of a palpable abnormality must be based on clinical grounds.  Patients will be notified of their results via letter from our facility. Accredited by [employer] and FDA. RISK ASSESSMENT: 5 Year Tyrer-Cuzick: 1.47% 10 Year Tyrer-Cuzick: 3.75% Lifetime Tyrer-Cuzick: 31.01% TISSUE DENSITY: There are scattered areas of fibroglandular density. INDICATION: Elena Chiu is a 38 y.o. female presenting for screening mammography. FINDINGS: LEFT 1) MASS [A]: There is an 11 mm oval mass with circumscribed margins seen in the upper outer quadrant of the left breast in the middle depth. Right There are no suspicious masses, grouped microcalcifications or areas of unexplained architectural distortion. The skin and nipple areolar complex are unremarkable.      Impression: Additional imaging required. A breast health care nurse from our facility will be contacting the patient regarding the need for additional imaging. This patient has been identified as being at elevated risk for development of breast cancer based on the Tyrer-Cuzick model. She may benefit from supplemental screening. ASSESSMENT/BI-RADS CATEGORY: Left: 0 - Incomplete: Needs Additional Imaging Evaluation Right: 1 - Negative Overall: 0 - Incomplete: Needs Additional Imaging Evaluation RECOMMENDATION:      - Ultrasound at the current time for the left breast.      - Routine  "screening mammogram in 1 year for the right breast. Workstation ID: [id number]       Objective:     Physical Exam  Constitutional:       Appearance: She is well-developed. She is obese.   Cardiovascular:      Rate and Rhythm: Normal rate and regular rhythm.      Heart sounds: Normal heart sounds. No murmur heard.  Pulmonary:      Effort: Pulmonary effort is normal. No respiratory distress.      Breath sounds: Normal breath sounds.   Skin:     General: Skin is warm and dry.   Neurological:      Mental Status: She is alert and oriented to person, place, and time.           There are no Patient Instructions on file for this visit.    [facility]    Portions of the record may have been created with voice recognition software.  Occasional wrong word or \"sound a like\" substitutions may have occurred due to the inherent limitations of voice recognition software.  Read the chart carefully and recognize, using context, where substitutions have occurred.  "

## 2024-06-06 NOTE — ASSESSMENT & PLAN NOTE
Blood pressure is well-managed with amlodipine and lisinopril.  Advised to monitor blood pressures at home as we are restarting phentermine.

## 2024-06-07 DIAGNOSIS — R06.83 SNORING: Primary | ICD-10-CM

## 2024-06-14 ENCOUNTER — HOSPITAL ENCOUNTER (OUTPATIENT)
Dept: SLEEP CENTER | Facility: CLINIC | Age: 39
Discharge: HOME | End: 2024-06-14
Payer: COMMERCIAL

## 2024-06-14 DIAGNOSIS — R06.83 SNORING: ICD-10-CM

## 2024-06-14 PROCEDURE — G0399 HOME SLEEP TEST/TYPE 3 PORTA: HCPCS

## 2024-06-14 NOTE — PROGRESS NOTES
Home Sleep Study Documentation    HOME STUDY DEVICE: Noxturnal no                                           Amanda G3 yes device # 15      Pre-Sleep Home Study:    Set-up and instructions performed by: Vanna    Technician performed demonstration for Patient: yes    Return demonstration performed by Patient: yes    Written instructions provided to Patient: yes    Patient signed consent form: yes        Post-Sleep Home Study:    Additional comments by Patient: None    Home Sleep Study Failed:no:    Failure reason: N/A    Reported or Detected: N/A    Scored by: AURELIA Swan

## 2024-06-17 ENCOUNTER — TELEPHONE (OUTPATIENT)
Dept: BARIATRICS | Facility: CLINIC | Age: 39
End: 2024-06-17

## 2024-06-17 ENCOUNTER — CONSULT (OUTPATIENT)
Dept: BARIATRICS | Facility: CLINIC | Age: 39
End: 2024-06-17
Payer: COMMERCIAL

## 2024-06-17 VITALS
RESPIRATION RATE: 16 BRPM | BODY MASS INDEX: 46.22 KG/M2 | SYSTOLIC BLOOD PRESSURE: 126 MMHG | WEIGHT: 287.6 LBS | DIASTOLIC BLOOD PRESSURE: 80 MMHG | HEIGHT: 66 IN | HEART RATE: 94 BPM

## 2024-06-17 DIAGNOSIS — E66.01 MORBID (SEVERE) OBESITY DUE TO EXCESS CALORIES (HCC): ICD-10-CM

## 2024-06-17 DIAGNOSIS — E11.9 TYPE 2 DIABETES MELLITUS WITHOUT COMPLICATION, WITHOUT LONG-TERM CURRENT USE OF INSULIN (HCC): ICD-10-CM

## 2024-06-17 DIAGNOSIS — I10 ESSENTIAL HYPERTENSION: ICD-10-CM

## 2024-06-17 DIAGNOSIS — E66.01 OBESITY, CLASS III, BMI 40-49.9 (MORBID OBESITY) (HCC): ICD-10-CM

## 2024-06-17 DIAGNOSIS — G43.809 OTHER MIGRAINE WITHOUT STATUS MIGRAINOSUS, NOT INTRACTABLE: ICD-10-CM

## 2024-06-17 DIAGNOSIS — Z01.818 ENCOUNTER FOR OTHER PREPROCEDURAL EXAMINATION: Primary | ICD-10-CM

## 2024-06-17 DIAGNOSIS — E66.01 CLASS 3 SEVERE OBESITY DUE TO EXCESS CALORIES WITHOUT SERIOUS COMORBIDITY WITH BODY MASS INDEX (BMI) OF 40.0 TO 44.9 IN ADULT (HCC): ICD-10-CM

## 2024-06-17 PROCEDURE — 99204 OFFICE O/P NEW MOD 45 MIN: CPT | Performed by: SURGERY

## 2024-06-17 NOTE — TELEPHONE ENCOUNTER
Spoke with patient to inform SX procedure was not a covered by insurance and before I was able to let patient know the appointment would be canceled the pt disconnected from the call.

## 2024-06-17 NOTE — LETTER
2024     SAGRARIO Mao  [address] [place] [id number]    Patient: Elena Chiu   YOB: 1985   Date of Visit: 2024       Dear STEPH Graf:    Thank you for referring Elena Chiu to me for evaluation for metabolic and bariatric surgery. Below are my notes for this consultation.    If you have questions, please do not hesitate to call me. I look forward to following your patient along with you.         Sincerely,        Odalys Posada MD        CC: No Recipients    Odalys Posada MD  2024  9:24 AM  Sign when Signing Visit      BARIATRIC INITIAL CONSULT - BARIATRIC SURGERY    Elena Chiu 38 y.o. female MRN: [id number]  Unit/Bed#:  Encounter: [id number]      HPI:  Elena Chiu is a 38 y.o. female who presents with a longstanding history of morbid obesity and inability to sustain a meaningful weight loss.  She has a 7-year-old and a 9-year-old (point girl).  She is employed at [employer] (makes [employer]).  She works overnight.  She desires to pursue metabolic and bariatric surgery to improve her health.  Rare GERD.  Rare NSAIDs.  Denies tobacco.  Denies DVT/PE.  Here today to discuss bariatric options.    Visit type: initial visit    Symptoms: excess weight    Associated Symptoms: none    Associated Conditions: glucose intolerance, hyperlipidemia, low HDL, and elevated LDL  Disease Complications: hypertension  Weight Loss Interest: high    Exercise Frequency:daily  Types of Exercise: walking      Review of Systems   Neurological:  Positive for headaches.   All other systems reviewed and are negative.      Historical Information  Past Medical History:   Diagnosis Date   • Asthma     childhood   • Diabetes mellitus (HCC)     gestational   • Elective      Resolved 2016    • H/O gestational diabetes mellitus, not currently pregnant 2019   • Hypertension    • Menorrhagia with regular cycle 2018   • Migraine    •  "Morbid obesity with BMI of 45.0-49.9, adult ([facility])    • Prediabetes 2021   • Varicella     childhood     Past Surgical History:   Procedure Laterality Date   • INDUCED        Social History  Social History     Substance and Sexual Activity   Alcohol Use No     Social History     Substance and Sexual Activity   Drug Use No     Social History     Tobacco Use   Smoking Status Never   Smokeless Tobacco Never     Family History:  Obesity (sleeve gastrectomy)    Meds/Allergies  all medications and allergies reviewed  No Known Allergies    Objective      Current Vitals:   /80 (BP Location: Left arm, Patient Position: Sitting, Cuff Size: Large)   Pulse 94   Resp 16   Ht 5' 5.59\" (1.666 m)   Wt 130 kg (287 lb 9.6 oz)   BMI 47.00 kg/m²       Invasive Devices       None                   Physical Exam  Constitutional:       Appearance: Normal appearance.   HENT:      Head: Atraumatic.      Nose: No rhinorrhea.   Eyes:      Extraocular Movements: Extraocular movements intact.   Cardiovascular:      Rate and Rhythm: Normal rate.   Pulmonary:      Effort: Pulmonary effort is normal. No respiratory distress.   Abdominal:      General: Abdomen is flat. There is no distension.   Musculoskeletal:         General: Normal range of motion.      Cervical back: Normal range of motion.   Skin:     General: Skin is warm and dry.   Neurological:      General: No focal deficit present.      Mental Status: She is alert and oriented to person, place, and time.   Psychiatric:         Mood and Affect: Mood normal.         Behavior: Behavior normal.         Lab Results: I have personally reviewed pertinent lab results.    Imaging: I have personally reviewed pertinent reports.    EKG, Pathology, and Other Studies: I have personally reviewed pertinent reports.        Assessment/PLAN:    38 y.o. yo female with a long standing h/o of obesity and inability to sustain any meaningful weight loss on her own despite several " attempts.    She is interested in the Laparoscopic sleeve gastrectomy.    As a part of her pre op evaluation, she will be referred to a cardiologist and for a sleep evaluation and consult after successfully completing an evaluation with our pre-certification/, registered dietician and licensed clinical .    She needs an EGD to evaluate the anatomy of her GI tract prior to the operation.  I have spent over 45 minutes with her face to face in the office today discussing her options and details of the surgery. Over 50% of this was coordinating care.    She was given the opportunity to ask questions and I have answered all of them.  I have discussed and educated the patient with regards to the components of our multidisciplinary program and the importance of compliance and follow up in the post operative period. The patient was also instructed with regards to the importance of behavior modification, nutritional counseling, support meeting attendance and lifestyle changes that are important to ensure success.     Although there is a great statistical chance of improvement or even resolution of most of her associated comorbidities, the results vary from patient to patient and they largely depend on her commitment and compliance.         Odalys Posada MD  6/17/2024  9:21 AM

## 2024-06-17 NOTE — PROGRESS NOTES
BARIATRIC INITIAL CONSULT - BARIATRIC SURGERY    Elena Chiu 38 y.o. female MRN: [id number]  Unit/Bed#:  Encounter: [id number]      HPI:  Elena Chiu is a 38 y.o. female who presents with a longstanding history of morbid obesity and inability to sustain a meaningful weight loss.  She has a 7-year-old and a 9-year-old (point girl).  She is employed at [employer] (makes [email or web address]).  She works overnight.  She desires to pursue metabolic and bariatric surgery to improve her health.  Rare GERD.  Rare NSAIDs.  Denies tobacco.  Denies DVT/PE.  Here today to discuss bariatric options.    Visit type: initial visit    Symptoms: excess weight    Associated Symptoms: none    Associated Conditions: glucose intolerance, hyperlipidemia, low HDL, and elevated LDL  Disease Complications: hypertension  Weight Loss Interest: high    Exercise Frequency:daily  Types of Exercise: walking      Review of Systems   Neurological:  Positive for headaches.   All other systems reviewed and are negative.      Historical Information   Past Medical History:   Diagnosis Date    Asthma     childhood    Diabetes mellitus (HCC)     gestational    Elective      Resolved 2016     H/O gestational diabetes mellitus, not currently pregnant 2019    Hypertension     Menorrhagia with regular cycle 2018    Migraine     Morbid obesity with BMI of 45.0-49.9, adult (HCC)     Prediabetes 2021    Varicella     childhood     Past Surgical History:   Procedure Laterality Date    INDUCED        Social History   Social History     Substance and Sexual Activity   Alcohol Use No     Social History     Substance and Sexual Activity   Drug Use No     Social History     Tobacco Use   Smoking Status Never   Smokeless Tobacco Never     Family History:  Obesity (sleeve gastrectomy)    Meds/Allergies   all medications and allergies reviewed  No Known Allergies    Objective       Current Vitals:   /80 (BP  "Location: Left arm, Patient Position: Sitting, Cuff Size: Large)   Pulse 94   Resp 16   Ht 5' 5.59\" (1.666 m)   Wt 130 kg (287 lb 9.6 oz)   BMI 47.00 kg/m²       Invasive Devices       None                   Physical Exam  Constitutional:       Appearance: Normal appearance.   HENT:      Head: Atraumatic.      Nose: No rhinorrhea.   Eyes:      Extraocular Movements: Extraocular movements intact.   Cardiovascular:      Rate and Rhythm: Normal rate.   Pulmonary:      Effort: Pulmonary effort is normal. No respiratory distress.   Abdominal:      General: Abdomen is flat. There is no distension.   Musculoskeletal:         General: Normal range of motion.      Cervical back: Normal range of motion.   Skin:     General: Skin is warm and dry.   Neurological:      General: No focal deficit present.      Mental Status: She is alert and oriented to person, place, and time.   Psychiatric:         Mood and Affect: Mood normal.         Behavior: Behavior normal.         Lab Results: I have personally reviewed pertinent lab results.    Imaging: I have personally reviewed pertinent reports.    EKG, Pathology, and Other Studies: I have personally reviewed pertinent reports.        Assessment/PLAN:    38 y.o. yo female with a long standing h/o of obesity and inability to sustain any meaningful weight loss on her own despite several attempts.    She is interested in the Laparoscopic sleeve gastrectomy.    As a part of her pre op evaluation, she will be referred to a cardiologist and for a sleep evaluation and consult after successfully completing an evaluation with our pre-certification/, registered dietician and licensed clinical .    She needs an EGD to evaluate the anatomy of her GI tract prior to the operation.  I have spent over 45 minutes with her face to face in the office today discussing her options and details of the surgery. Over 50% of this was coordinating care.    She was given the " opportunity to ask questions and I have answered all of them.  I have discussed and educated the patient with regards to the components of our multidisciplinary program and the importance of compliance and follow up in the post operative period. The patient was also instructed with regards to the importance of behavior modification, nutritional counseling, support meeting attendance and lifestyle changes that are important to ensure success.     Although there is a great statistical chance of improvement or even resolution of most of her associated comorbidities, the results vary from patient to patient and they largely depend on her commitment and compliance.         Odalys Posada MD  6/17/2024  9:21 AM

## 2024-06-19 PROBLEM — G47.33 OSA (OBSTRUCTIVE SLEEP APNEA): Status: ACTIVE | Noted: 2024-06-19

## 2024-06-20 DIAGNOSIS — G47.33 OSA (OBSTRUCTIVE SLEEP APNEA): Primary | ICD-10-CM

## 2024-06-20 PROCEDURE — 95806 SLEEP STUDY UNATT&RESP EFFT: CPT | Performed by: INTERNAL MEDICINE

## 2024-06-21 ENCOUNTER — TELEPHONE (OUTPATIENT)
Dept: SLEEP CENTER | Facility: CLINIC | Age: 39
End: 2024-06-21

## 2024-06-21 DIAGNOSIS — G47.33 OSA (OBSTRUCTIVE SLEEP APNEA): Primary | ICD-10-CM

## 2024-06-21 NOTE — TELEPHONE ENCOUNTER
----- Message from Johny Seaman MD sent at 6/20/2024 11:17 AM EDT -----  Regarding: Extremely high AHI  Hi this patient's home study showed an AHI above 100, study was ordered by PCP.  I recommended a CPAP study.  Could we expedite please, thank you

## 2024-06-21 NOTE — TELEPHONE ENCOUNTER
Home sleep study resulted, confirms diagnosis of severe HEAVENLY (GARY-103.7) with hypoxemia.  CPAP titration study recommended with request to expedite.    Call placed to patient, left call back message.    [employer] message sent providing results, recommendations and instructions.

## 2024-07-05 ENCOUNTER — OFFICE VISIT (OUTPATIENT)
Dept: FAMILY MEDICINE CLINIC | Facility: CLINIC | Age: 39
End: 2024-07-05
Payer: COMMERCIAL

## 2024-07-05 VITALS
WEIGHT: 289.4 LBS | SYSTOLIC BLOOD PRESSURE: 128 MMHG | HEIGHT: 66 IN | BODY MASS INDEX: 46.51 KG/M2 | HEART RATE: 84 BPM | DIASTOLIC BLOOD PRESSURE: 70 MMHG

## 2024-07-05 DIAGNOSIS — I10 ESSENTIAL HYPERTENSION: Primary | ICD-10-CM

## 2024-07-05 DIAGNOSIS — E66.01 CLASS 3 SEVERE OBESITY DUE TO EXCESS CALORIES WITHOUT SERIOUS COMORBIDITY WITH BODY MASS INDEX (BMI) OF 40.0 TO 44.9 IN ADULT (HCC): ICD-10-CM

## 2024-07-05 DIAGNOSIS — G47.33 OSA (OBSTRUCTIVE SLEEP APNEA): ICD-10-CM

## 2024-07-05 LAB

## 2024-07-05 PROCEDURE — 99214 OFFICE O/P EST MOD 30 MIN: CPT | Performed by: NURSE PRACTITIONER

## 2024-07-05 RX ORDER — PHENTERMINE HYDROCHLORIDE 30 MG/1
30 CAPSULE ORAL EVERY MORNING
Qty: 30 CAPSULE | Refills: 1 | Status: SHIPPED | OUTPATIENT
Start: 2024-07-05

## 2024-07-05 NOTE — ASSESSMENT & PLAN NOTE
Will increase phentermine to 30 mg daily.  Advised food diary daily and exercise.  Patient will return in 2 months for routine follow-up.  Patient would like to still pursue possible bariatric surgery depending on insurance coverage.

## 2024-07-05 NOTE — ASSESSMENT & PLAN NOTE
Will order generic CPAP settings for patient as patient is waiting for CPAP titration in November.

## 2024-07-05 NOTE — PROGRESS NOTES
Ambulatory Visit  Name: Elena Chiu      : 1985      MRN: [id number]  Encounter Provider: SAGRARIO Mao  Encounter Date: 2024   Encounter department: [facility] [address]    Assessment & Plan   1. Essential hypertension  Assessment & Plan:  Blood pressure is well-managed with amlodipine and lisinopril daily.  2. Class 3 severe obesity due to excess calories without serious comorbidity with body mass index (BMI) of 40.0 to 44.9 in adult (HCC)  Assessment & Plan:  Will increase phentermine to 30 mg daily.  Advised food diary daily and exercise.  Patient will return in 2 months for routine follow-up.  Patient would like to still pursue possible bariatric surgery depending on insurance coverage.  Orders:  -     phentermine 30 MG capsule; Take 1 capsule (30 mg total) by mouth every morning  3. HEAVENLY (obstructive sleep apnea)  Assessment & Plan:  Will order generic CPAP settings for patient as patient is waiting for CPAP titration in November.       History of Present Illness     Patient presents for follow-up on phentermine.  She has not lost any weight in the last month.  She did feel like the medication was helping her for the first 2 weeks, but now not helping.  She did have a consult with weight management and was told that her insurance does not cover surgery.  She did have her sleep apnea test which showed severe obstructive sleep apnea.        Review of Systems   Constitutional:  Negative for chills and fever.   HENT:  Negative for ear pain and sore throat.    Eyes:  Negative for pain and visual disturbance.   Respiratory:  Negative for cough and shortness of breath.    Cardiovascular:  Negative for chest pain and palpitations.   Gastrointestinal:  Negative for abdominal pain, constipation, diarrhea, nausea and vomiting.   Genitourinary:  Negative for dysuria and hematuria.   Musculoskeletal:  Negative for arthralgias and back pain.   Skin:  Negative  "for color change and rash.   Neurological:  Negative for dizziness, seizures, syncope, light-headedness and headaches.   Psychiatric/Behavioral:  Negative for dysphoric mood and sleep disturbance. The patient is not nervous/anxious.    All other systems reviewed and are negative.    Current Outpatient Medications on File Prior to Visit   Medication Sig Dispense Refill    amLODIPine (NORVASC) 10 mg tablet Take 1 tablet (10 mg total) by mouth daily 90 tablet 1    levonorgestrel (MIRENA) 20 MCG/24HR IUD 1 each by Intrauterine route once      lisinopril (ZESTRIL) 10 mg tablet Take 1 tablet (10 mg total) by mouth daily 90 tablet 1    metFORMIN (GLUCOPHAGE-XR) 750 mg 24 hr tablet Take 1 tablet (750 mg total) by mouth daily with dinner 90 tablet 1    topiramate (TOPAMAX) 50 MG tablet Take 1 tablet at night 90 tablet 1    [DISCONTINUED] phentermine 15 MG capsule Take 1 capsule (15 mg total) by mouth every morning 30 capsule 0     No current facility-administered medications on file prior to visit.      Objective     /70 (BP Location: Left arm, Patient Position: Sitting, Cuff Size: Large)   Pulse 84   Ht 5' 5.59\" (1.666 m)   Wt 131 kg (289 lb 6.4 oz)   BMI 47.30 kg/m²     Physical Exam  Vitals and nursing note reviewed.   Constitutional:       General: She is not in acute distress.     Appearance: She is well-developed.   HENT:      Head: Normocephalic and atraumatic.   Cardiovascular:      Rate and Rhythm: Normal rate and regular rhythm.      Heart sounds: No murmur heard.  Pulmonary:      Effort: Pulmonary effort is normal. No respiratory distress.      Breath sounds: Normal breath sounds.   Musculoskeletal:         General: No swelling.      Cervical back: Neck supple.   Skin:     General: Skin is warm and dry.      Capillary Refill: Capillary refill takes less than 2 seconds.   Neurological:      Mental Status: She is alert and oriented to person, place, and time.   Psychiatric:         Mood and Affect: Mood " normal.       Administrative Statements   I have spent a total time of 20 minutes in caring for this patient on the day of the visit/encounter including Instructions for management, Patient and family education, Importance of tx compliance, Reviewing / ordering tests, medicine, procedures  , and Obtaining or reviewing history  .

## 2024-09-05 ENCOUNTER — APPOINTMENT (OUTPATIENT)
Dept: RADIOLOGY | Facility: CLINIC | Age: 39
End: 2024-09-05
Payer: COMMERCIAL

## 2024-09-05 ENCOUNTER — OFFICE VISIT (OUTPATIENT)
Dept: URGENT CARE | Facility: CLINIC | Age: 39
End: 2024-09-05
Payer: COMMERCIAL

## 2024-09-05 VITALS
RESPIRATION RATE: 18 BRPM | SYSTOLIC BLOOD PRESSURE: 158 MMHG | OXYGEN SATURATION: 98 % | TEMPERATURE: 97.7 F | DIASTOLIC BLOOD PRESSURE: 78 MMHG | HEART RATE: 103 BPM

## 2024-09-05 DIAGNOSIS — S80.02XA CONTUSION OF LEFT KNEE, INITIAL ENCOUNTER: Primary | ICD-10-CM

## 2024-09-05 DIAGNOSIS — S89.92XA INJURY, KNEE, LEFT, INITIAL ENCOUNTER: ICD-10-CM

## 2024-09-05 DIAGNOSIS — M25.562 ACUTE PAIN OF LEFT KNEE: ICD-10-CM

## 2024-09-05 PROCEDURE — 99213 OFFICE O/P EST LOW 20 MIN: CPT

## 2024-09-05 PROCEDURE — 73564 X-RAY EXAM KNEE 4 OR MORE: CPT

## 2024-09-05 NOTE — PROGRESS NOTES
[facility]        NAME: Elena Chiu is a 38 y.o. female  : 1985    MRN: [id number]  DATE: 2024  TIME: 7:38 PM    Assessment and Plan   Contusion of left knee, initial encounter [S80.02XA]  1. Contusion of left knee, initial encounter        2. Acute pain of left knee  XR knee 4+ vw left injury        Xray appears negative for any fracture. Will follow up with radiologist report when available. Wrapped in elastic bandage.     Patient Instructions     Check my chart for final radiology results.  Ice/elevate.  Compression bandage for swelling and added support.  Tylenol/motrin as needed for pain.    Follow up with ortho if no improvement over the next 5 days.  Follow up with PCP in 3-5 days.  Proceed to the ER with worsening symptoms.    Chief Complaint     Chief Complaint   Patient presents with    Knee Pain     Left Radiates to her foot.   States she fell down the stairs striking her L knee. States numbness to foot. No interventions.          History of Present Illness       The patient presents today with complaints of L upper shin pain that started this morning. She was walking up cement steps when she tripped landing on both her knees, but states most of her weight landed on her L knee. She has a contusion to her L upper shin that is very tender to the touch, and reports tingling which radiates down her leg. She has not taken anything OTC for the pain or applied any ice to the area for the swelling. Is able to bear weight but with pain.         Review of Systems   Review of Systems   Constitutional:  Negative for chills and fever.   Musculoskeletal:  Positive for arthralgias (L knee). Negative for myalgias.   Skin:  Negative for rash.         Current Medications       Current Outpatient Medications:     amLODIPine (NORVASC) 10 mg tablet, Take 1 tablet (10 mg total) by mouth daily, Disp: 90 tablet, Rfl: 1    levonorgestrel (MIRENA) 20 MCG/24HR IUD, 1 each by Intrauterine  route once, Disp: , Rfl:     lisinopril (ZESTRIL) 10 mg tablet, Take 1 tablet (10 mg total) by mouth daily, Disp: 90 tablet, Rfl: 1    metFORMIN (GLUCOPHAGE-XR) 750 mg 24 hr tablet, Take 1 tablet (750 mg total) by mouth daily with dinner, Disp: 90 tablet, Rfl: 1    phentermine 30 MG capsule, Take 1 capsule (30 mg total) by mouth every morning, Disp: 30 capsule, Rfl: 1    topiramate (TOPAMAX) 50 MG tablet, Take 1 tablet at night, Disp: 90 tablet, Rfl: 1    Current Allergies     Allergies as of 2024    (No Known Allergies)            The following portions of the patient's history were reviewed and updated as appropriate: allergies, current medications, past family history, past medical history, past social history, past surgical history and problem list.     Past Medical History:   Diagnosis Date    Asthma     childhood    Diabetes mellitus (HCC)     gestational    Elective      Resolved 2016     H/O gestational diabetes mellitus, not currently pregnant 2019    Hypertension     Menorrhagia with regular cycle 2018    Migraine     Morbid obesity with BMI of 45.0-49.9, adult (HCC)     Prediabetes 2021    Varicella     childhood       Past Surgical History:   Procedure Laterality Date    INDUCED          Family History   Problem Relation Age of Onset    Cancer Mother     Heart disease Mother     Stroke Mother     Other Mother     Hypertension Mother     Breast cancer Mother 38    Diabetes Father     Asthma Sister     Asthma Sister     Asthma Sister     Diabetes Paternal Grandmother     Asthma Brother     Asthma Brother     Asthma Brother          Medications have been verified.        Objective   /78   Pulse 103   Temp 97.7 °F (36.5 °C)   Resp 18   SpO2 98%        Physical Exam     Physical Exam  Vitals and nursing note reviewed.   Constitutional:       General: She is not in acute distress.     Appearance: Normal appearance.   HENT:      Head: Normocephalic and  atraumatic.      Right Ear: External ear normal.      Left Ear: External ear normal.      Mouth/Throat:      Mouth: Mucous membranes are moist.   Eyes:      Pupils: Pupils are equal, round, and reactive to light.   Cardiovascular:      Rate and Rhythm: Normal rate.   Pulmonary:      Effort: Pulmonary effort is normal.   Musculoskeletal:      Left knee: No swelling, erythema or ecchymosis. Normal range of motion. Normal patellar mobility. Normal pulse.        Legs:       Comments: Plus 2 pedal pulse. Foot is warm and sensation is intact.    Skin:     General: Skin is warm and dry.   Neurological:      Mental Status: She is alert and oriented to person, place, and time. Mental status is at baseline.   Psychiatric:         Mood and Affect: Mood normal.         Behavior: Behavior normal.

## 2024-09-05 NOTE — PATIENT INSTRUCTIONS
Check my chart for final radiology results.  Ice/elevate.  Compression bandage for swelling and added support.  Tylenol/motrin as needed for pain.    Follow up with ortho if no improvement over the next 5 days.  Follow up with PCP in 3-5 days.  Proceed to the ER with worsening symptoms.

## 2025-02-28 NOTE — PATIENT INSTRUCTIONS
Reviewed all with patient  Blood pressure today is mildly elevated however she reports her home blood pressures are normal   I strongly suggest she keep the follow-up with bariatric send consider bariatric surgery for a permanent fix to her would weight issues  Hemoglobin A1c today was taken and is 6 4  Continue current medications for now  Please try to spot check the blood pressures at home record the numbers and follow-up sooner if the blood pressures remain in the 130s over 90s  Let us know if you would like to change your mind about the flu shot  Have the blood work done prior to the next appointment as I suspect you may be needing additional meds for diabetes 
Mildly to Moderately Impaired: difficulty hearing in some environments or speaker may need to increase volume or speak distinctly